# Patient Record
Sex: MALE | Race: WHITE | Employment: OTHER | URBAN - METROPOLITAN AREA
[De-identification: names, ages, dates, MRNs, and addresses within clinical notes are randomized per-mention and may not be internally consistent; named-entity substitution may affect disease eponyms.]

---

## 2021-05-18 ENCOUNTER — OFFICE VISIT (OUTPATIENT)
Dept: PRIMARY CARE CLINIC | Age: 66
End: 2021-05-18
Payer: MEDICARE

## 2021-05-18 VITALS
BODY MASS INDEX: 27.61 KG/M2 | DIASTOLIC BLOOD PRESSURE: 80 MMHG | SYSTOLIC BLOOD PRESSURE: 130 MMHG | HEART RATE: 67 BPM | HEIGHT: 68 IN | WEIGHT: 182.2 LBS | OXYGEN SATURATION: 100 % | TEMPERATURE: 98.3 F

## 2021-05-18 DIAGNOSIS — J02.9 SORE THROAT: ICD-10-CM

## 2021-05-18 DIAGNOSIS — M72.0 DUPUYTREN CONTRACTURE: Primary | ICD-10-CM

## 2021-05-18 PROCEDURE — G8417 CALC BMI ABV UP PARAM F/U: HCPCS | Performed by: FAMILY MEDICINE

## 2021-05-18 PROCEDURE — 3017F COLORECTAL CA SCREEN DOC REV: CPT | Performed by: FAMILY MEDICINE

## 2021-05-18 PROCEDURE — G8427 DOCREV CUR MEDS BY ELIG CLIN: HCPCS | Performed by: FAMILY MEDICINE

## 2021-05-18 PROCEDURE — 1123F ACP DISCUSS/DSCN MKR DOCD: CPT | Performed by: FAMILY MEDICINE

## 2021-05-18 PROCEDURE — 4040F PNEUMOC VAC/ADMIN/RCVD: CPT | Performed by: FAMILY MEDICINE

## 2021-05-18 PROCEDURE — 99213 OFFICE O/P EST LOW 20 MIN: CPT | Performed by: FAMILY MEDICINE

## 2021-05-18 PROCEDURE — 1036F TOBACCO NON-USER: CPT | Performed by: FAMILY MEDICINE

## 2021-05-18 RX ORDER — LISINOPRIL 20 MG/1
TABLET ORAL
COMMUNITY
Start: 2021-03-30 | End: 2021-08-02 | Stop reason: SDUPTHER

## 2021-05-18 RX ORDER — HYDROCHLOROTHIAZIDE 12.5 MG/1
TABLET ORAL
COMMUNITY
Start: 2021-03-30 | End: 2021-08-02 | Stop reason: SDUPTHER

## 2021-05-18 RX ORDER — SIMVASTATIN 20 MG
TABLET ORAL
COMMUNITY
Start: 2021-03-30 | End: 2021-08-02 | Stop reason: SDUPTHER

## 2021-05-18 SDOH — ECONOMIC STABILITY: TRANSPORTATION INSECURITY
IN THE PAST 12 MONTHS, HAS THE LACK OF TRANSPORTATION KEPT YOU FROM MEDICAL APPOINTMENTS OR FROM GETTING MEDICATIONS?: NO

## 2021-05-18 SDOH — ECONOMIC STABILITY: TRANSPORTATION INSECURITY
IN THE PAST 12 MONTHS, HAS LACK OF TRANSPORTATION KEPT YOU FROM MEETINGS, WORK, OR FROM GETTING THINGS NEEDED FOR DAILY LIVING?: NO

## 2021-05-18 SDOH — ECONOMIC STABILITY: FOOD INSECURITY: WITHIN THE PAST 12 MONTHS, THE FOOD YOU BOUGHT JUST DIDN'T LAST AND YOU DIDN'T HAVE MONEY TO GET MORE.: NEVER TRUE

## 2021-05-18 ASSESSMENT — PATIENT HEALTH QUESTIONNAIRE - PHQ9
SUM OF ALL RESPONSES TO PHQ9 QUESTIONS 1 & 2: 0
1. LITTLE INTEREST OR PLEASURE IN DOING THINGS: 0
SUM OF ALL RESPONSES TO PHQ QUESTIONS 1-9: 0
SUM OF ALL RESPONSES TO PHQ QUESTIONS 1-9: 0

## 2021-05-18 ASSESSMENT — SOCIAL DETERMINANTS OF HEALTH (SDOH): HOW HARD IS IT FOR YOU TO PAY FOR THE VERY BASICS LIKE FOOD, HOUSING, MEDICAL CARE, AND HEATING?: NOT HARD AT ALL

## 2021-05-18 NOTE — PROGRESS NOTES
Subjective:      Patient ID: Kostas Rod is a 77 y.o. male. Family h/o contracture of hand  joseph can feel his getting tighter  Also left side sore throat  No fever  H/o GERD, takes OTC prilosec      Review of Systems   Constitutional: Negative. HENT:        Left throat pain   Musculoskeletal:        Right hand tight       Objective:   Physical Exam  Constitutional:       Appearance: Normal appearance. HENT:      Mouth/Throat:      Mouth: Mucous membranes are moist.      Pharynx: Oropharynx is clear. Musculoskeletal:      Comments: Start of the lines of contracture but not yet surgical         Assessment:      1. Dupuytren contracture    2. Sore throat            Plan:      Senia Bermudez was seen today for hand pain and pharyngitis.     Diagnoses and all orders for this visit:    Dupuytren contracture  -     External Referral To Hand Surgery    Sore throat    sore throat lozenge            Electronically signed by Buck Perez MD on 5/18/2021 at 9:09 AM

## 2021-06-21 ENCOUNTER — TELEPHONE (OUTPATIENT)
Dept: PRIMARY CARE CLINIC | Age: 66
End: 2021-06-21

## 2021-07-08 ENCOUNTER — TELEPHONE (OUTPATIENT)
Dept: PRIMARY CARE CLINIC | Age: 66
End: 2021-07-08

## 2021-08-03 ENCOUNTER — OFFICE VISIT (OUTPATIENT)
Dept: PRIMARY CARE CLINIC | Age: 66
End: 2021-08-03
Payer: MEDICARE

## 2021-08-03 VITALS
HEIGHT: 68 IN | HEART RATE: 70 BPM | BODY MASS INDEX: 27.65 KG/M2 | WEIGHT: 182.4 LBS | DIASTOLIC BLOOD PRESSURE: 82 MMHG | OXYGEN SATURATION: 98 % | SYSTOLIC BLOOD PRESSURE: 132 MMHG

## 2021-08-03 DIAGNOSIS — J31.2 CHRONIC SORE THROAT: Primary | ICD-10-CM

## 2021-08-03 DIAGNOSIS — E78.2 HYPERLIPIDEMIA, MIXED: Primary | Chronic | ICD-10-CM

## 2021-08-03 DIAGNOSIS — Z00.00 ROUTINE GENERAL MEDICAL EXAMINATION AT A HEALTH CARE FACILITY: ICD-10-CM

## 2021-08-03 DIAGNOSIS — I10 ESSENTIAL HYPERTENSION: Chronic | ICD-10-CM

## 2021-08-03 PROCEDURE — 4040F PNEUMOC VAC/ADMIN/RCVD: CPT | Performed by: NURSE PRACTITIONER

## 2021-08-03 PROCEDURE — 3017F COLORECTAL CA SCREEN DOC REV: CPT | Performed by: NURSE PRACTITIONER

## 2021-08-03 PROCEDURE — 1123F ACP DISCUSS/DSCN MKR DOCD: CPT | Performed by: NURSE PRACTITIONER

## 2021-08-03 PROCEDURE — G0438 PPPS, INITIAL VISIT: HCPCS | Performed by: NURSE PRACTITIONER

## 2021-08-03 RX ORDER — SIMVASTATIN 20 MG
20 TABLET ORAL NIGHTLY
Qty: 90 TABLET | Refills: 1 | Status: SHIPPED | OUTPATIENT
Start: 2021-08-03 | End: 2022-01-19

## 2021-08-03 RX ORDER — HYDROCHLOROTHIAZIDE 12.5 MG/1
12.5 TABLET ORAL DAILY
Qty: 30 TABLET | Refills: 1 | Status: SHIPPED | OUTPATIENT
Start: 2021-08-03 | End: 2021-11-18

## 2021-08-03 RX ORDER — LISINOPRIL 20 MG/1
20 TABLET ORAL DAILY
Qty: 30 TABLET | Refills: 1 | Status: SHIPPED | OUTPATIENT
Start: 2021-08-03 | End: 2021-11-18

## 2021-08-03 ASSESSMENT — LIFESTYLE VARIABLES
HOW OFTEN DO YOU HAVE SIX OR MORE DRINKS ON ONE OCCASION: 0
HOW OFTEN DURING THE LAST YEAR HAVE YOU HAD A FEELING OF GUILT OR REMORSE AFTER DRINKING: NEVER
AUDIT-C TOTAL SCORE: 3
HOW OFTEN DURING THE LAST YEAR HAVE YOU NEEDED AN ALCOHOLIC DRINK FIRST THING IN THE MORNING TO GET YOURSELF GOING AFTER A NIGHT OF HEAVY DRINKING: 0
AUDIT TOTAL SCORE: 3
HOW OFTEN DURING THE LAST YEAR HAVE YOU FAILED TO DO WHAT WAS NORMALLY EXPECTED FROM YOU BECAUSE OF DRINKING: 0
AUDIT TOTAL SCORE: 0
HOW OFTEN DURING THE LAST YEAR HAVE YOU FOUND THAT YOU WERE NOT ABLE TO STOP DRINKING ONCE YOU HAD STARTED: NEVER
HOW MANY STANDARD DRINKS CONTAINING ALCOHOL DO YOU HAVE ON A TYPICAL DAY: ONE OR TWO
HAS A RELATIVE, FRIEND, DOCTOR, OR ANOTHER HEALTH PROFESSIONAL EXPRESSED CONCERN ABOUT YOUR DRINKING OR SUGGESTED YOU CUT DOWN: NO
HAVE YOU OR SOMEONE ELSE BEEN INJURED AS A RESULT OF YOUR DRINKING: NO
HOW OFTEN DURING THE LAST YEAR HAVE YOU FAILED TO DO WHAT WAS NORMALLY EXPECTED FROM YOU BECAUSE OF DRINKING: NEVER
HOW OFTEN DURING THE LAST YEAR HAVE YOU HAD A FEELING OF GUILT OR REMORSE AFTER DRINKING: 0
AUDIT-C TOTAL SCORE: 0
HOW MANY STANDARD DRINKS CONTAINING ALCOHOL DO YOU HAVE ON A TYPICAL DAY: 0
HAVE YOU OR SOMEONE ELSE BEEN INJURED AS A RESULT OF YOUR DRINKING: 0
HOW OFTEN DURING THE LAST YEAR HAVE YOU BEEN UNABLE TO REMEMBER WHAT HAPPENED THE NIGHT BEFORE BECAUSE YOU HAD BEEN DRINKING: 0
HOW OFTEN DO YOU HAVE SIX OR MORE DRINKS ON ONE OCCASION: NEVER
HOW OFTEN DURING THE LAST YEAR HAVE YOU BEEN UNABLE TO REMEMBER WHAT HAPPENED THE NIGHT BEFORE BECAUSE YOU HAD BEEN DRINKING: NEVER
HOW OFTEN DURING THE LAST YEAR HAVE YOU FOUND THAT YOU WERE NOT ABLE TO STOP DRINKING ONCE YOU HAD STARTED: 0
HOW OFTEN DO YOU HAVE A DRINK CONTAINING ALCOHOL: 3
HOW OFTEN DO YOU HAVE A DRINK CONTAINING ALCOHOL: TWO TO THREE TIMES A WEEK
HAS A RELATIVE, FRIEND, DOCTOR, OR ANOTHER HEALTH PROFESSIONAL EXPRESSED CONCERN ABOUT YOUR DRINKING OR SUGGESTED YOU CUT DOWN: 0
HOW OFTEN DURING THE LAST YEAR HAVE YOU NEEDED AN ALCOHOLIC DRINK FIRST THING IN THE MORNING TO GET YOURSELF GOING AFTER A NIGHT OF HEAVY DRINKING: NEVER

## 2021-08-03 ASSESSMENT — PATIENT HEALTH QUESTIONNAIRE - PHQ9
SUM OF ALL RESPONSES TO PHQ QUESTIONS 1-9: 0
1. LITTLE INTEREST OR PLEASURE IN DOING THINGS: 0
SUM OF ALL RESPONSES TO PHQ QUESTIONS 1-9: 0
2. FEELING DOWN, DEPRESSED OR HOPELESS: 0
SUM OF ALL RESPONSES TO PHQ QUESTIONS 1-9: 0
SUM OF ALL RESPONSES TO PHQ9 QUESTIONS 1 & 2: 0

## 2021-08-03 NOTE — PATIENT INSTRUCTIONS
Personalized Preventive Plan for Carleen Wang - 8/3/2021  Medicare offers a range of preventive health benefits. Some of the tests and screenings are paid in full while other may be subject to a deductible, co-insurance, and/or copay. Some of these benefits include a comprehensive review of your medical history including lifestyle, illnesses that may run in your family, and various assessments and screenings as appropriate. After reviewing your medical record and screening and assessments performed today your provider may have ordered immunizations, labs, imaging, and/or referrals for you. A list of these orders (if applicable) as well as your Preventive Care list are included within your After Visit Summary for your review. Other Preventive Recommendations:    · A preventive eye exam performed by an eye specialist is recommended every 1-2 years to screen for glaucoma; cataracts, macular degeneration, and other eye disorders. · A preventive dental visit is recommended every 6 months. · Try to get at least 150 minutes of exercise per week or 10,000 steps per day on a pedometer . · Order or download the FREE \"Exercise & Physical Activity: Your Everyday Guide\" from [employer]. Call 4-253.609.7277 or search [employer] online. · You need 4264-0783 mg of calcium and 8492-2128 IU of vitamin D per day. It is possible to meet your calcium requirement with diet alone, but a vitamin D supplement is usually necessary to meet this goal.  · When exposed to the sun, use a sunscreen that protects against both UVA and UVB radiation with an SPF of 30 or greater. Reapply every 2 to 3 hours or after sweating, drying off with a towel, or swimming. · Always wear a seat belt when traveling in a car. Always wear a helmet when riding a bicycle or motorcycle.

## 2021-08-03 NOTE — PROGRESS NOTES
Medicare Annual Wellness Visit  Name: Fredo Hauser Date: 8/3/2021   MRN: [id number] Sex: Male   Age: 77 y.o. Ethnicity: Unavailable / Unknown   : 1955 Race: White (non-)      Arturo Lin is here for Medicare AWV    Screenings for behavioral, psychosocial and functional/safety risks, and cognitive dysfunction are all negative except as indicated below. These results, as well as other patient data from the [address] form, are documented in Flowsheets linked to this Encounter. Allergies   Allergen Reactions    Seasonal      Runny nose scratchy throat       Prior to Visit Medications    Medication Sig Taking? Authorizing Provider   simvastatin (ZOCOR) 20 MG tablet Take 1 tablet by mouth nightly Yes Luisa Griffin MD   lisinopril (PRINIVIL;ZESTRIL) 20 MG tablet Take 1 tablet by mouth daily Yes Luisa Griffin MD   hydroCHLOROthiazide (HYDRODIURIL) 12.5 MG tablet Take 1 tablet by mouth daily Yes Luisa Griffin MD       Past Medical History:   Diagnosis Date    Hyperlipidemia     Hypertension        History reviewed. No pertinent surgical history. Family History   Problem Relation Age of Onset    No Known Problems Mother     No Known Problems Father        CareTeam (Including outside providers/suppliers regularly involved in providing care):   Patient Care Team:  Luisa Griffin MD as PCP - General (Family Medicine)  Luisa Griffin MD as PCP - [facility] Empaneled Provider    Wt Readings from Last 3 Encounters:   21 182 lb 6.4 oz (82.7 kg)   21 182 lb 3.2 oz (82.6 kg)     Vitals:    21 1028   BP: 132/82   Site: Left Upper Arm   Position: Sitting   Cuff Size: Medium Adult   Pulse: 70   SpO2: 98%   Weight: 182 lb 6.4 oz (82.7 kg)   Height: 5' 8\" (1.727 m)     Body mass index is 27.73 kg/m². Based upon direct observation of the patient, evaluation of cognition reveals recent and remote memory intact.       Patient's complete Health Risk Assessment and screening values have been reviewed and are found in Flowsheets. The following problems were reviewed today and where indicated follow up appointments were made and/or referrals ordered. Positive Risk Factor Screenings with Interventions:          General Health and ACP:  General  In general, how would you say your health is?: Very Good  In the past 7 days, have you experienced any of the following?  New or Increased Pain, New or Increased Fatigue, Loneliness, Social Isolation, Stress or Anger?: None of These  Do you get the social and emotional support that you need?: Yes  Do you have a Living Will?: Yes  Advance Directives     Power of [address] [facility] Will ACP-Advance Directive ACP-Power of     Not on File Not on File Not on File Not on File      General Health Risk Interventions:  · no issues identified     Hearing/Vision:  No exam data present  Hearing/Vision  Do you or your family notice any trouble with your hearing that hasn't been managed with hearing aids?: (!) Yes  Do you have difficulty driving, watching TV, or doing any of your daily activities because of your eyesight?: No  Have you had an eye exam within the past year?: (!) No  Hearing/Vision Interventions:  · Hearing concerns:  patient declines any further evaluation/treatment for hearing issues  · Vision concerns:  patient encouraged to make appointment with his/her eye specialist      Personalized Preventive Plan   Current Health Maintenance Status  Immunization History   Administered Date(s) Administered    COVID-19, Bergeron Peter, PF, 30mcg/0.3mL 02/17/2021, 03/10/2021        Health Maintenance   Topic Date Due    Potassium monitoring  Never done    Creatinine monitoring  Never done    Lipid screen  Never done    DTaP/Tdap/Td vaccine (1 - Tdap) Never done    Diabetes screen  Never done    Colon cancer screen colonoscopy  Never done    Shingles Vaccine (1 of 2) Never done    Pneumococcal 65+ years Vaccine (1 of 1 - PPSV23) Never done   [employer] Visit (AWV)  Never done    Flu vaccine (1) 09/01/2021    COVID-19 Vaccine  Completed    Hepatitis A vaccine  Aged Out    Hepatitis B vaccine  Aged Out    Hib vaccine  Aged Out    Meningococcal (ACWY) vaccine  Aged Out    Hepatitis C screen  Discontinued     Recommendations for [employer] Due: see orders and patient instructions/AVS.  . Recommended screening schedule for the next 5-10 years is provided to the patient in written form: see Patient Licha Chen was seen today for medicare awv. Diagnoses and all orders for this visit:    Hyperlipidemia, mixed  -     Lipid Panel; Future    Essential hypertension  -     Basic Metabolic Panel;  Future    Routine general medical examination at a health care facility

## 2021-09-10 ENCOUNTER — HOSPITAL ENCOUNTER (OUTPATIENT)
Age: 66
Discharge: HOME | End: 2021-09-10
Payer: MEDICARE

## 2021-09-10 PROCEDURE — 93005 ELECTROCARDIOGRAM TRACING: CPT | Performed by: OTOLARYNGOLOGY

## 2021-09-11 LAB
EKG ATRIAL RATE: 79 BPM
EKG P AXIS: 63 DEGREES
EKG P-R INTERVAL: 186 MS
EKG Q-T INTERVAL: 396 MS
EKG QRS DURATION: 102 MS
EKG QTC CALCULATION (BAZETT): 454 MS
EKG R AXIS: -46 DEGREES
EKG T AXIS: 35 DEGREES
EKG VENTRICULAR RATE: 79 BPM

## 2021-09-11 PROCEDURE — 93010 ELECTROCARDIOGRAM REPORT: CPT | Performed by: INTERNAL MEDICINE

## 2021-09-22 ENCOUNTER — HOSPITAL ENCOUNTER (OUTPATIENT)
Dept: CT IMAGING | Age: 66
Discharge: HOME | End: 2021-09-24
Payer: MEDICARE

## 2021-09-22 DIAGNOSIS — D37.02 NEOPLASM OF UNCERTAIN BEHAVIOR OF BASE OF TONGUE: ICD-10-CM

## 2021-09-22 LAB
CREAT SERPL-MCNC: 0.87 MG/DL (ref 0.7–1.2)
GFR AFRICAN AMERICAN: >60 ML/MIN
GFR NON-AFRICAN AMERICAN: >60 ML/MIN
GFR SERPL CREATININE-BSD FRML MDRD: NORMAL ML/MIN/{1.73_M2}
GFR SERPL CREATININE-BSD FRML MDRD: NORMAL ML/MIN/{1.73_M2}

## 2021-09-22 PROCEDURE — 2580000003 HC RX 258: Performed by: NURSE PRACTITIONER

## 2021-09-22 PROCEDURE — 70491 CT SOFT TISSUE NECK W/DYE: CPT

## 2021-09-22 PROCEDURE — 82565 ASSAY OF CREATININE: CPT

## 2021-09-22 PROCEDURE — 6360000004 HC RX CONTRAST MEDICATION: Performed by: NURSE PRACTITIONER

## 2021-09-22 PROCEDURE — 36415 COLL VENOUS BLD VENIPUNCTURE: CPT

## 2021-09-22 RX ORDER — 0.9 % SODIUM CHLORIDE 0.9 %
80 INTRAVENOUS SOLUTION INTRAVENOUS ONCE
Status: COMPLETED | OUTPATIENT
Start: 2021-09-22 | End: 2021-09-22

## 2021-09-22 RX ORDER — SODIUM CHLORIDE 0.9 % (FLUSH) 0.9 %
10 SYRINGE (ML) INJECTION PRN
Status: DISCONTINUED | OUTPATIENT
Start: 2021-09-22 | End: 2021-09-25 | Stop reason: HOSPADM

## 2021-09-22 RX ADMIN — SODIUM CHLORIDE, PRESERVATIVE FREE 10 ML: 5 INJECTION INTRAVENOUS at 15:59

## 2021-09-22 RX ADMIN — IOPAMIDOL 75 ML: 755 INJECTION, SOLUTION INTRAVENOUS at 16:02

## 2021-09-22 RX ADMIN — SODIUM CHLORIDE 80 ML: 9 INJECTION, SOLUTION INTRAVENOUS at 16:01

## 2021-09-27 ENCOUNTER — HOSPITAL ENCOUNTER (OUTPATIENT)
Age: 66
Setting detail: SPECIMEN
Discharge: HOME | End: 2021-09-27
Payer: MEDICARE

## 2021-10-01 LAB — SURGICAL PATHOLOGY REPORT: NORMAL

## 2021-10-05 ENCOUNTER — TELEPHONE (OUTPATIENT)
Dept: ONCOLOGY | Age: 66
End: 2021-10-05

## 2021-10-05 NOTE — TELEPHONE ENCOUNTER
Shorty Arias., Dr. Ludwin Naidu office, called alerting writer to pt's case & requesting oncology navigation. Elayne stated referrals placed for MO & RO. Requested referrals along with pt's records faxed to [facility] RO @ [phone]. [facility] updated on conversation with Elayne & requested dual MO/RO consults scheduled. [facility] alerted writer pt scheduled 10/12 to arrive @ [address], [address] Dr[address] Hubert Lee consult, & [address] Dr[address] Villa Kwong consult. Name: Anisha Todd  : 1955  MRN: [id number]    Oncology Navigation- Initial Note:    Intake-  Contact Type: Telephone  Notes: Introductory phone call made to patient, instructed patient Hoda Braun will be navigating oncology care & may call writer with any questions/concerns @ [phone] prn. Discussed potential barriers to care, pt denied any. Inquired on dental health, pt stated sees private dentist q 6 months, last seen 5 months ago, & @ this time unsure of dentist name. Requested pt bring dentist information to consultations, pt verbalized understanding. Pt updated on 10/12 dual RO/MO appt details & verbalized understanding. Pt denied questions/concerns. [address] written materials along with writer's business card mailed to patient. Will continue to follow.     Electronically signed by Colby Pena RN on 10/5/2021 at 1:45 PM

## 2021-10-06 ENCOUNTER — HOSPITAL ENCOUNTER (OUTPATIENT)
Dept: NUCLEAR MEDICINE | Age: 66
Discharge: HOME | End: 2021-10-08
Payer: MEDICARE

## 2021-10-06 DIAGNOSIS — C09.0 MALIGNANT NEOPLASM OF TONSILLAR FOSSA (HCC): ICD-10-CM

## 2021-10-06 LAB — GLUCOSE BLD-MCNC: 105 MG/DL (ref 75–110)

## 2021-10-06 PROCEDURE — 82947 ASSAY GLUCOSE BLOOD QUANT: CPT

## 2021-10-06 PROCEDURE — 78815 PET IMAGE W/CT SKULL-THIGH: CPT

## 2021-10-06 PROCEDURE — 3430000000 HC RX DIAGNOSTIC RADIOPHARMACEUTICAL: Performed by: OTOLARYNGOLOGY

## 2021-10-06 PROCEDURE — 2580000003 HC RX 258: Performed by: OTOLARYNGOLOGY

## 2021-10-06 PROCEDURE — A9552 F18 FDG: HCPCS | Performed by: OTOLARYNGOLOGY

## 2021-10-06 RX ORDER — SODIUM CHLORIDE 0.9 % (FLUSH) 0.9 %
10 SYRINGE (ML) INJECTION ONCE
Status: COMPLETED | OUTPATIENT
Start: 2021-10-06 | End: 2021-10-06

## 2021-10-06 RX ORDER — FLUDEOXYGLUCOSE F 18 200 MCI/ML
10 INJECTION, SOLUTION INTRAVENOUS
Status: COMPLETED | OUTPATIENT
Start: 2021-10-06 | End: 2021-10-06

## 2021-10-06 RX ADMIN — FLUDEOXYGLUCOSE F 18 11.38 MILLICURIE: 200 INJECTION, SOLUTION INTRAVENOUS at 07:51

## 2021-10-06 RX ADMIN — SODIUM CHLORIDE, PRESERVATIVE FREE 10 ML: 5 INJECTION INTRAVENOUS at 07:51

## 2021-10-12 ENCOUNTER — TELEPHONE (OUTPATIENT)
Dept: PRIMARY CARE CLINIC | Age: 66
End: 2021-10-12

## 2021-10-12 ENCOUNTER — INITIAL CONSULT (OUTPATIENT)
Dept: ONCOLOGY | Age: 66
End: 2021-10-12
Payer: MEDICARE

## 2021-10-12 ENCOUNTER — HOSPITAL ENCOUNTER (OUTPATIENT)
Dept: RADIATION ONCOLOGY | Age: 66
Discharge: HOME | End: 2021-10-12
Payer: MEDICARE

## 2021-10-12 VITALS
SYSTOLIC BLOOD PRESSURE: 148 MMHG | HEIGHT: 68 IN | DIASTOLIC BLOOD PRESSURE: 84 MMHG | WEIGHT: 181 LBS | BODY MASS INDEX: 27.43 KG/M2 | HEART RATE: 66 BPM | TEMPERATURE: 97.7 F

## 2021-10-12 VITALS
TEMPERATURE: 97.6 F | HEIGHT: 68 IN | DIASTOLIC BLOOD PRESSURE: 81 MMHG | WEIGHT: 181 LBS | RESPIRATION RATE: 18 BRPM | SYSTOLIC BLOOD PRESSURE: 164 MMHG | OXYGEN SATURATION: 99 % | BODY MASS INDEX: 27.43 KG/M2 | HEART RATE: 70 BPM

## 2021-10-12 DIAGNOSIS — C09.9 SQUAMOUS CELL CARCINOMA OF LEFT TONSIL (HCC): Primary | ICD-10-CM

## 2021-10-12 DIAGNOSIS — C10.9 OROPHARYNGEAL CANCER (HCC): Primary | ICD-10-CM

## 2021-10-12 PROCEDURE — G8427 DOCREV CUR MEDS BY ELIG CLIN: HCPCS | Performed by: INTERNAL MEDICINE

## 2021-10-12 PROCEDURE — 99213 OFFICE O/P EST LOW 20 MIN: CPT | Performed by: RADIOLOGY

## 2021-10-12 PROCEDURE — 99205 OFFICE O/P NEW HI 60 MIN: CPT | Performed by: RADIOLOGY

## 2021-10-12 PROCEDURE — 99205 OFFICE O/P NEW HI 60 MIN: CPT | Performed by: INTERNAL MEDICINE

## 2021-10-12 PROCEDURE — G8482 FLU IMMUNIZE ORDER/ADMIN: HCPCS | Performed by: INTERNAL MEDICINE

## 2021-10-12 PROCEDURE — G8417 CALC BMI ABV UP PARAM F/U: HCPCS | Performed by: INTERNAL MEDICINE

## 2021-10-12 RX ORDER — ACETAMINOPHEN AND CODEINE PHOSPHATE 300; 30 MG/1; MG/1
TABLET ORAL
Status: ON HOLD | COMMUNITY
Start: 2021-09-27 | End: 2022-05-24 | Stop reason: HOSPADM

## 2021-10-12 RX ORDER — OMEPRAZOLE 20 MG/1
20 CAPSULE, DELAYED RELEASE ORAL DAILY
COMMUNITY

## 2021-10-12 RX ORDER — M-VIT,TX,IRON,MINS/CALC/FOLIC 27MG-0.4MG
1 TABLET ORAL DAILY
COMMUNITY

## 2021-10-12 ASSESSMENT — PAIN DESCRIPTION - PAIN TYPE: TYPE: ACUTE PAIN

## 2021-10-12 ASSESSMENT — PAIN DESCRIPTION - LOCATION: LOCATION: THROAT

## 2021-10-12 ASSESSMENT — PAIN SCALES - GENERAL: PAINLEVEL_OUTOF10: 2

## 2021-10-12 NOTE — CONSULTS
[address]            Radiation Oncology          [address] [facility], [address]        Deandra Terry: [phone]        F: [phone]       [facility] [address] [facility]       Radiation Oncology   [facility] [address] [place] [address], [address] [facility]       Deandra Cloudz: [phone]       F: [phone]       [employer]        10/12/2021    Patient: Ludin Daley   YOB: 1955       Dear Dr Alaina Valiente:    Thank you for referring Ludin Daley to me for evaluation. Below are the relevant portions of my assessment and plan of care. If you have questions, please do not hesitate to call me. I look forward to following this patient along with you. Sincerely,    Electronically signed by Cedric Baumgarten, MD on 10/12/21 at 1:58 PM EDT    CC: Patient Care Team:  Roger Alonzo MD as PCP - General (Family Medicine)  Roger Alonzo MD as PCP - [facility]  Josie Salguero RN as Nurse Navigator (Oncology)  ------------------------------------------------------------------------------------------------------------------------------------------------------------------------------------------      RADIATION ONCOLOGY NEW PATIENT VISIT:    Date of Service: 10/12/2021    Location:  [facility] Radiation Oncology,   [address] [place] [address], Counts include 234 beds at the [facility]   [phone]     Patient ID:   Ludin Daley  : 1955   MRN: [id number]    CHIEF COMPLAINT: \"Left tonsil cancer\"    DIAGNOSIS:  Cancer Staging  Oropharyngeal cancer ([facility] [address]  Staging form: Pharynx - HPV-Mediated Oropharynx, AJCC 8th Edition  - Clinical stage from 10/12/2021: Stage I (cT2, cN0, cM0) - Signed by Gwen Goode MD on 10/12/2021      HISTORY OF PRESENT ILLNESS:   Ludin Daley is a 77 y.o. male with a history of sore throat for about 6 months who was referred by his PCP to ENT and saw Dr Alaina Valiente and was noted to have a left tonsillar lesion. A CT of the neck was done and found a 3.2cm mass in the left tonsil, and a follow up PET scan on 10/6/21 confirmed only FDG uptake in the left tonsil mass. 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 He has otherwise noted no symptoms of weight loss, hemoptysis, or trouble swallowing. He denies any other symptoms of headaches, dizziness, chest pain, shortness of breath, abdominal pain, nausea, diarrhea, bony pain, swelling, or bleeding. Patient is otherwise very active and would like to minimize any toxicities. He does follow with a dentist routinely and has good dentition. Patient has been referred to us today in radiation oncology and medical oncology for discussion of his diagnosis and treatment options.     PRIOR RADIATION HISTORY:  No prior history of radiation therapy    PACEMAKER: None    PAST MEDICAL HISTORY:  Past Medical History:   Diagnosis Date    Hyperlipidemia     Hypertension        PAST SURGICAL HISTORY:  Past Surgical History:   Procedure Laterality Date    ACHILLES TENDON SURGERY      about 30 years ago        MEDICATIONS:    Current Outpatient Medications:     omeprazole (PRILOSEC) 20 MG delayed release capsule, Take 20 mg by mouth daily, Disp: , Rfl:     Multiple Vitamins-Minerals (THERAPEUTIC MULTIVITAMIN-MINERALS) tablet, Take 1 tablet by mouth daily, Disp: , Rfl:     acetaminophen-codeine (TYLENOL #3) 300-30 MG per tablet, , Disp: , Rfl:     simvastatin (ZOCOR) 20 MG tablet, Take 1 tablet by mouth nightly, Disp: 90 tablet, Rfl: 1    lisinopril (PRINIVIL;ZESTRIL) 20 MG tablet, Take 1 tablet by mouth daily, Disp: 30 tablet, Rfl: 1    hydroCHLOROthiazide (HYDRODIURIL) 12.5 MG tablet, Take 1 tablet by mouth daily, Disp: 30 tablet, Rfl: 1    ALLERGIES:   Allergies   Allergen Reactions    Seasonal      Runny nose scratchy throat       SOCIAL HISTORY:  Social History     Socioeconomic History    Marital status:      Spouse name: None    Number of children: None    Years of education: None    Highest education level: None   Occupational History    None   Tobacco Use    Smoking status: Former Smoker     Types: Cigarettes    Smokeless tobacco: Never Used   Vaping Use    Vaping Use: Never used   Substance and Sexual Activity    Alcohol use: Yes     Comment: socially     Drug use: Not Currently    Sexual activity: Yes     Partners: Female   Other Topics Concern    None   Social History Narrative    None     Social Determinants of Health     Financial Resource Strain: Low Risk     Difficulty of Paying Living Expenses: Not hard at all   Food Insecurity: No Food Insecurity    Worried About Running Out of Food in the Last Year: Never true    [address] in the Last Year: Never true   Transportation Needs: No Transportation Needs    Lack of Transportation (Medical): No    Lack of Transportation (Non-Medical): No   Physical Activity:     Days of Exercise per Week:     Minutes of Exercise per Session:    Stress: No Stress Concern Present    Feeling of Stress : Not at all   Social Connections:     Frequency of Communication with Friends and Family:     Frequency of Social Gatherings with Friends and Family:     Attends Bahai Services:     Active Member of Clubs or Organizations:     Attends Club or Organization Meetings:     Marital Status:    Intimate Partner Violence:     Fear of Current or Ex-Partner:     Emotionally Abused:     Physically Abused:     Sexually Abused:        FAMILY HISTORY:  Family History   Problem Relation Age of Onset    Coronary Art Dis Mother     Cancer Father        REVIEW OF SYSTEMS:    GENERAL/CONSTITUTIONAL: The patient denies fever, fatigue, weakness, weight gain or weight loss. HEENT: (+) Sore throat. The patient denies pain, redness, loss of vision, double or blurred vision.  The patient denies ringing in the ears, loss of hearing, hoarseness, trouble swallowing, or painful swallowing. CARDIOVASCULAR: The patient denies chest pain, irregular heartbeats, sudden changes in heartbeat or palpitation. RESPIRATORY: The patient denies shortness of breath, cough, hemoptysis. GASTROINTESTINAL: The patient denies nausea, vomiting, diarrhea, constipation, blood in the stools. GENITOURINARY: The patient denies difficult urination, pain or burning with urination, blood in the urine. MUSCULOSKELETAL: The patient denies arm, buttock, thigh or calf cramps. No joint or muscle pain. SKIN: The patient denies easy bruising, skin redness, skin rash, hives. NEUROLOGIC: The patient denies headache, dizziness, fainting, muscle spasm, loss of consciousness. ENDOCRINE: The patient denies intolerance to hot or cold temperature, flushing. HEMATOLOGIC/LYMPHATIC: The patient denies anemia, bleeding tendency or clotting tendency. ALLERGIC/IMMUNOLOGIC: The patient denies rhinitis, asthma, skin sensitivity. PYSCHOLOGIC: The patient denies any depression, anxiety, or confusion. A full 14 point review of systems was performed and assessed and found to be negative except as noted above. PHYSICAL EXAMINATION:    CHAPERONE: Family/friend/companieon Present    ECO Asymptomatic    VITAL SIGNS: BP (!) 164/81   Pulse 70   Temp 97.6 °F (36.4 °C) (Temporal)   Resp 18   Ht 5' 8\" (1.727 m)   Wt 181 lb (82.1 kg)   SpO2 99%   BMI 27.52 kg/m²   GENERAL:  General appearance is that of a well-nourished, well-developed in no apparent distress. HEENT: Normocephalic, atraumatic, EOMI, moist mucosa, (+) L tonsil erythematous mass. NECK:  No adenopathy or a palpable thyroid mass, trachea is midline. LYMPHATICS: No cervical, supraclavicular adenopathy. HEART:  Normal rate and regular rhythm, normal heart sounds. LUNGS:  Pulmonary effort normal. Normal breath sounds. ABDOMEN:  Soft, nontender, non distended. EXTREMITIES:  No edema. No calf tenderness.   MSK:  No spinal tenderness. Normal ROM. NEUROLOGICAL: Alert and oriented. Strength and sensation intact bilaterally. No focal deficits. PSYCH: Mood normal, behavior normal.  SKIN: No erythema, no desquamation. LABS:  No results found for: WBC, NEUTROABS, HGB, PLT  No results found for: , CEA  No results found for:   No results found for: PSA      IMAGING:   10/6/21 PET Scan  Impression   1. Redemonstration of left palatine tonsillar lesion which is FDG avid   consistent with history of malignancy. 2. Posterior right thyroid hypodense nodule which is FDG avid, recommend   further evaluation with dedicated ultrasound. 3. Left retrocardiac lower lobe nodular area of opacification as measured   above which demonstrates faint uptake and is favored to represent possible   pneumonia or other infectious/inflammatory process though   metastatic/neoplastic etiology not definitively excluded.  Recommend post   treatment chest CT in 6-8 weeks to reassess.  If persistent at time of   follow-up then further evaluation can be obtained with tissue sampling.       9/22/21 CT Neck  Impression   1. 3.2 x 2.3 cm masslike lesion centered in the left palatine tonsil,   extending anteriorly to the base the tongue.  ENT evaluation and biopsy   recommended. 2. No lymphadenopathy or other evidence of metastatic disease in the neck. PATHOLOGY:  10/1/21 Biopsy  -- Diagnosis --   LEFT TONSIL, BIOPSIES:             -  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          ASSESSMENT AND PLAN:   Tiera Delcid is a 77 y.o. male with a Cancer Staging  Oropharyngeal cancer ([facility] [address]  Staging form: Pharynx - HPV-Mediated Oropharynx, AJCC 8th Edition  - Clinical stage from 10/12/2021: Stage I (cT2, cN0, cM0) - Signed by Madelaine Hope MD on 10/12/2021      We reviewed with the patient and family the testing that has been done so far, including imaging and pathology results, and the symptoms of sore throat. We also reviewed their staging based on the testing that as been done and the recommendations per the NCCN guidelines. We discussed the options of treatment, including surgery, chemotherapy, and radiation, and the rationale of why radiation therapy would be indicated for this diagnosis. We also discussed that they have the option to not pursue our recommended treatment as well. We reviewed that surgery could be a viable option which could avoid any adjuvant treatment with radiation or chemotherapy if he can be resected without any adverse features. We also reviewed that since he doesn't have any lymph node involvement definitive radiation therapy could also be considered. We reviewed the logistics of radiation treatment planning, including a CT Simulation session, as well as daily treatments for approximately 6-7 weeks. With regards to radiation to the head/neck area, I discussed the possible short-term side effects of skin reaction (causing redness, dryness, or peeling), tiredness, low blood counts (causing infection or bleeding), sore throat, change in taste, hair loss in treated area and dryness of the mouth, dysphagia leading to feeding tube placement which may be permanent. Possible long-term side effects discussed included hyperpigmentation of the skin, increased firmness of the tissues of the neck, permanent dryness of the mouth, permanent change in taste, damage to the nerves (causing numbness, weakness, or paralysis), damage to the brain, damage to the bone (causing necrosis), trismus, hearing loss, decreased thyroid function, and scarring of the lung (causing shortness of breath/cough). After ample time to review the patient's questions, patient will be referred to ENT at [facility] for evaluation of tonsillectomy +/- neck dissection. We will follow up afterwards to review options or pathology.  He will still be advised to follow up with his dentist for screening and evaluation in case he does need radiation therapy. Patient was in agreement with my recommendations. All questions were answered to their satisfaction. Patient was advised to contact us anytime should they have any questions or concerns. Electronically signed by Raimundo Allen MD on 10/12/21 at 1:58 PM EDT      Drugs Prescribed:  New Prescriptions    No medications on file       Orders Placed:     Orders Placed This Encounter   Procedures    External Referral To ENT         CC:  Patient Care Team:  Netta Geller MD as PCP - General (Family Medicine)  Netta Geller MD as PCP - [facility] Empaneled Provider  Sheree Fair RN as Nurse Navigator (Oncology)         Total time spent on this case on the day of encounter is more than 60 minutes. This time includes combination of one or more of the following - review of necessary tests, review of pertinent medical records from the EMR, performing medically appropriate examination and evaluation, counseling and educating the patient/family/caregiver, ordering necessary medical tests, procedures etc., documenting the clinical information in the electronic medical record, care coordination, referring and communicating with other health care providers and interpretation of results independently. This note is created with the assistance of a speech recognition program.  While intending to generate a document that actually reflects the content of the visit, the document can still have some errors including those of syntax and sound a like substitutions which may escape proof reading. It such instances, actual meaning can be extrapolated by contextual diversion.

## 2021-10-12 NOTE — PATIENT INSTRUCTIONS
Referral to ENT surgeon at [facility]  RTC 2 weeks after surgery (please have Abbie Almanzar follow up on this)

## 2021-10-12 NOTE — TELEPHONE ENCOUNTER
[address] [facility] calling about referral and needing recent labs, imagining, and the last office note. I sent over an office note but we did not order any labs or imaging. And I dont believe we made the referral so I faxed a letter to [employer] stating this.

## 2021-10-12 NOTE — PROGRESS NOTES
Patient ID: Mirtha Russ, 1955, [id number], 77 y.o. Referred by : Eloy Valdes MD  Reason for consultation:   Oropharyngeal carcin shirley, left tonsil squamous cell carcinoma, p16 positive, clinical stage T2 a N0 M0, stage I disease    Cancer Staging  Oropharyngeal cancer ([facility] [address]  Staging form: Pharynx - HPV-Mediated Oropharynx, AJCC 8th Edition  - Clinical stage from 10/12/2021: Stage I (cT2, cN0, cM0) - Signed by Leonardo Luque MD on 10/12/2021      HISTORY OF PRESENT ILLNESS:    Oncologic History:  Mirtha Russ is 77 y.o. male was seen during initial consultation with for newly diagnosed left tonsillar/oropharyngeal carcinoma. Patient initially presented with sore throat/throat pain in May of this year and was referred to ENT for further evaluation. His ENT evaluation did show 3 cm left tonsillar mass limited to tonsils only. Patient had a CT of the neck on 9/22/2021 which showed 3.2 cm mass in the left palatine tonsil without any abnormal lymphadenopathy. Patient was evaluated by ENT and had a biopsy of the left tonsil which showed poorly differentiated invasive squamous cell carcinoma, p16 positive. Subsequently patient had a PET scan on 10/6/2021 which showed no distant metastasis and no lymph node metastasis noted. Patient has been evaluated by radiation oncology today. He denies any unintentional weight loss, drenching night sweats fever chills.     Past Medical History:   Diagnosis Date    Hyperlipidemia     Hypertension        Past Surgical History:   Procedure Laterality Date    ACHILLES TENDON SURGERY      about 30 years ago        Allergies   Allergen Reactions    Seasonal      Runny nose scratchy throat       Current Outpatient Medications   Medication Sig Dispense Refill    acetaminophen-codeine (TYLENOL #3) 300-30 MG per tablet       omeprazole (PRILOSEC) 20 MG delayed release capsule Take 20 mg by mouth daily      Multiple Vitamins-Minerals (THERAPEUTIC MULTIVITAMIN-MINERALS) tablet Take 1 tablet by mouth daily      simvastatin (ZOCOR) 20 MG tablet Take 1 tablet by mouth nightly 90 tablet 1    lisinopril (PRINIVIL;ZESTRIL) 20 MG tablet Take 1 tablet by mouth daily 30 tablet 1    hydroCHLOROthiazide (HYDRODIURIL) 12.5 MG tablet Take 1 tablet by mouth daily 30 tablet 1     No current facility-administered medications for this visit. Social History     Socioeconomic History    Marital status:      Spouse name: Not on file    Number of children: Not on file    Years of education: Not on file    Highest education level: Not on file   Occupational History    Not on file   Tobacco Use    Smoking status: Former Smoker     Types: Cigarettes    Smokeless tobacco: Never Used   Vaping Use    Vaping Use: Never used   Substance and Sexual Activity    Alcohol use: Yes     Comment: socially     Drug use: Not Currently    Sexual activity: Yes     Partners: Female   Other Topics Concern    Not on file   Social History Narrative    Not on file     Social Determinants of Health     Financial Resource Strain: Low Risk     Difficulty of Paying Living Expenses: Not hard at all   Food Insecurity: No Food Insecurity    Worried About [facility] in the Last Year: Never true    Judi of Food in the Last Year: Never true   Transportation Needs: No Transportation Needs    Lack of Transportation (Medical): No    Lack of Transportation (Non-Medical):  No   Physical Activity:     Days of Exercise per Week:     Minutes of Exercise per Session:    Stress: No Stress Concern Present    Feeling of Stress : Not at all   Social Connections:     Frequency of Communication with Friends and Family:     Frequency of Social Gatherings with Friends and Family:     Attends Hoahaoism Services:     Active Member of Clubs or Organizations:     Attends Club or Organization Meetings:     Marital Status:    Intimate Partner Violence:     Fear of Current or Ex-Partner:     Emotionally Abused:     Physically Abused:     Sexually Abused:        Family History   Problem Relation Age of Onset    Coronary Art Dis Mother     Cancer Father         REVIEW OF SYSTEM:     Constitutional: No fever or chills. No night sweats, no weight loss   Eyes: No eye discharge, double vision, or eye pain   HEENT: negative for sore mouth, sore throat, hoarseness and voice change   Respiratory: negative for cough , sputum, dyspnea, wheezing, hemoptysis, chest pain   Cardiovascular: negative for chest pain, dyspnea, palpitations, orthopnea, PND   Gastrointestinal: negative for nausea, vomiting, diarrhea, constipation, abdominal pain, Dysphagia, hematemesis and hematochezia   Genitourinary: negative for frequency, dysuria, nocturia, urinary incontinence, and hematuria   Integument: negative for rash, skin lesions, bruises.    Hematologic/Lymphatic: negative for easy bruising, bleeding, lymphadenopathy, petechiae and swelling/edema   Endocrine: negative for heat or cold intolerance, tremor, weight changes, change in bowel habits and hair loss   Musculoskeletal: negative for myalgias, arthralgias, pain, joint swelling,and bone pain   Neurological: negative for headaches, dizziness, seizures, weakness, numbness       OBJECTIVE:         Vitals:    10/12/21 1250   BP: (!) 148/84   Pulse: 66   Temp: 97.7 °F (36.5 °C)       PHYSICAL EXAM:   General appearance - well appearing, no in pain or distress   Mental status - alert and cooperative   Eyes - pupils equal and reactive, extraocular eye movements intact   Ears - bilateral TM's and external ear canals normal   Mouth - mucous membranes moist, pharynx normal without lesions   Neck - supple, no significant adenopathy   Lymphatics - no palpable lymphadenopathy, no hepatosplenomegaly   Chest - clear to auscultation, no wheezes, rales or rhonchi, symmetric air entry   Heart - normal rate, regular rhythm, normal S1, S2, no murmurs, rubs, clicks or gallops   Abdomen - soft, nontender, nondistended, no masses or organomegaly   Neurological - alert, oriented, normal speech, no focal findings or movement disorder noted   Musculoskeletal - no joint tenderness, deformity or swelling   Extremities - peripheral pulses normal, no pedal edema, no clubbing or cyanosis   Skin - normal coloration and turgor, no rashes, no suspicious skin lesions noted ,      LABORATORY DATA:   No results found for: WBC, HGB, HCT, MCV, PLT, LABLYMP, MID, GRAN, LYMPHOPCT, MIDPERCENT, GRANULOCYTES, RBC, MCH, MCHC, RDW, NEUTOPHILPCT, MONOPCT, EOSPCT, BASOPCT, NEUTROABS, LYMPHSABS, MONOSABS, EOSABS, BASOSABS      Chemistry        Component Value Date/Time    CREATININE 0.87 09/22/2021 1523    No results found for: CALCIUM, ALKPHOS, AST, ALT, BILITOT     PATHOLOGY DATA:   10/1/2021  8:43 AM - [facility], [facility] Incoming Lab Results From [email or web address]    Component Collected Lab   Surgical Pathology Report 09/27/2021 10:13  [address]   -- Diagnosis --   LEFT TONSIL, BIOPSIES:             -  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 IMAGING DATA:    PET CT SKULL BASE TO MID THIGH  Narrative: EXAMINATION:  WHOLE BODY PET/CT  10/6/2021    TECHNIQUE:  Following IV injection of 11.4 mCi of F 18 -FDG, PET  tumor imaging was  acquired from the base of the skull to the mid thighs. Computed tomography  was used for purposes of attenuation correction and anatomic localization. Fusion imaging was utilized for interpretation. Uptake time 59 mins. Glucose level 105 mg/dl. COMPARISON:  CT neck 09/22/2021    HISTORY:  Reason for Exam: Malignant neoplasm of tonsillar fossa  Acuity: Acute  Type of Exam: Initial    FINDINGS:  HEAD/NECK: Redemonstration of left palatine tonsillar lesion which is FDG  avid with max SUV of 12.0. No metabolically active cervical lymphadenopathy.   8 mm posterior right thyroid hypodense nodule which is FDG avid with max SUV  of 6.0. CHEST: Left retrocardiac lower lobe nodular area of opacification measuring 2  cm which demonstrates faint uptake with max SUV of 2.3. No metabolically  active axillary, hilar, or mediastinal lymphadenopathy. ABDOMEN/PELVIS: No metabolically active intraperitoneal mass. No  metabolically active abdominal or pelvic lymphadenopathy. Physiologic  activity in the gastrointestinal and genitourinary systems. BONES/SOFT TISSUE: No abnormal FDG activity localizes to the bones. No  aggressive osseous lesion. INCIDENTAL CT FINDINGS: Multivessel coronary artery calcification. Aortic  atherosclerosis most pronounced in the infrarenal segment. Sigmoid  diverticulosis. Impression: 1. Redemonstration of left palatine tonsillar lesion which is FDG avid  consistent with history of malignancy. 2. Posterior right thyroid hypodense nodule which is FDG avid, recommend  further evaluation with dedicated ultrasound. 3. Left retrocardiac lower lobe nodular area of opacification as measured  above which demonstrates faint uptake and is favored to represent possible  pneumonia or other infectious/inflammatory process though  metastatic/neoplastic etiology not definitively excluded. Recommend post  treatment chest CT in 6-8 weeks to reassess. If persistent at time of  follow-up then further evaluation can be obtained with tissue sampling. ASSESSMENT:    David Roberson is a 77 y.o. pleasant male who was seen during initial consultation visit for newly diagnosed oropharyngeal cancer, p16 positive, clinical stage T2 a N0 M0, stage I disease. In total, I spent greater than 80 minutes in face-to-face consultation with the patient and the majority of this time was spent in education, counseling, and coordination of care.   During our encounter, I personally reviewed the above history and evaluation, including radiology and pathology data, in detail    I reviewed the NCCN guidelines and goals of care with patient. I discussed the care plan with radiation oncologist Dr. Ha Salazar. I explained that patient has early stage disease and as per the NCCN guidelines surgery would be recommended. Based on the final surgical pathology will discuss role of adjuvant radiation/with or without chemotherapy depending on the final surgical pathology. We will refer him to ENT oncology at [facility]. During today's visit, the patient and the family had a number of reasonable questions which were answered to their satisfaction. They verbalized understanding of the information provided and they agreed to proceed as outlined above. PLAN:   Patient would be referred to [facility], ENT surgery for definitive surgery  return to clinic 2 weeks after surgery to discuss surgical pathology results      Charles Sims MD  Hematologist/Medical Oncologist      This note is created with the assistance of a speech recognition program.  While intending to generate a document that actually reflects the content of the visit, the document can still have some errors including those of syntax and sound a like substitutions which may escape proof reading. It such instances, actual meaning can be extrapolated by contextual diversion.     Gabrielle Painting MD

## 2021-10-12 NOTE — PROGRESS NOTES
Referring Physician: Dr. Isac Noriega:    10/12/21 1059   BP: (!) 164/81   Pulse: 70   Resp: 18   Temp: 97.6 °F (36.4 °C)   SpO2: 99%    :  Patient Currently in Pain: Yes  Pain Assessment: 0-10  Pain Level: 2       Wt Readings from Last 1 Encounters:   10/12/21 181 lb (82.1 kg)        Body mass index is 27.52 kg/m². Height: 5' 8\" (172.7 cm)         Immunizations:    Influenza status:    [x]   Current   []   Patient declined    Pneumococcal status:  [x]   Current  []   Patient declined  Covid status:   [x]  Dose #1:                     [x]  Dose #2:               [x]  Dose #3    Smoking Status:    [] Smoker - PPD:   [x] Nonsmoker - Quit Date:               [] Never a smoker      No chief complaint on file. Cancer Staging  No matching staging information was found for the patient. Prior Radiation Therapy? No   If yes, site treated:   Facility:                             Date:    Concurrent Chemo/radiation? No   If yes, start date:    Prior Chemotherapy? No   If yes    Facility:                             Date:    Prior Hormonal Therapy? No   If yes   Facility:                             Date:    Head and Neck Cancer? Yes   If yes, please remind physician to place swallow study order and speech therapy order.       Pacemaker/Defibulator/ICD:  No             BREAST/GYN  Patient only:     LMP:    Age at first Menses:    Para:    :    Cup size:    Lymphedema Evaluation[de-identified]   [] left arm      [] right arm  Location:     Measurement (cm)    Upper Bicep :    Lower Bicep :           Current Outpatient Medications   Medication Sig Dispense Refill    omeprazole (PRILOSEC) 20 MG delayed release capsule Take 20 mg by mouth daily      Multiple Vitamins-Minerals (THERAPEUTIC MULTIVITAMIN-MINERALS) tablet Take 1 tablet by mouth daily      acetaminophen-codeine (TYLENOL #3) 300-30 MG per tablet       simvastatin (ZOCOR) 20 MG tablet Take 1 tablet by mouth nightly 90 tablet 1    lisinopril (PRINIVIL;ZESTRIL) 20 MG tablet Take 1 tablet by mouth daily 30 tablet 1    hydroCHLOROthiazide (HYDRODIURIL) 12.5 MG tablet Take 1 tablet by mouth daily 30 tablet 1     No current facility-administered medications for this encounter. Past Medical History:   Diagnosis Date    Hyperlipidemia     Hypertension        Past Surgical History:   Procedure Laterality Date    ACHILLES TENDON SURGERY      about 30 years ago        Family History   Problem Relation Age of Onset    Coronary Art Dis Mother     Cancer Father        Social History     Socioeconomic History    Marital status:      Spouse name: Not on file    Number of children: Not on file    Years of education: Not on file    Highest education level: Not on file   Occupational History    Not on file   Tobacco Use    Smoking status: Former Smoker     Types: Cigarettes    Smokeless tobacco: Never Used   Vaping Use    Vaping Use: Never used   Substance and Sexual Activity    Alcohol use: Yes     Comment: socially     Drug use: Not Currently    Sexual activity: Yes     Partners: Female   Other Topics Concern    Not on file   Social History Narrative    Not on file     Social Determinants of Health     Financial Resource Strain: Low Risk     Difficulty of Paying Living Expenses: Not hard at all   Food Insecurity: No Food Insecurity    Worried About Running Out of Food in the Last Year: Never true    Judi of Food in the Last Year: Never true   Transportation Needs: No Transportation Needs    Lack of Transportation (Medical): No    Lack of Transportation (Non-Medical):  No   Physical Activity:     Days of Exercise per Week:     Minutes of Exercise per Session:    Stress: No Stress Concern Present    Feeling of Stress : Not at all   Social Connections:     Frequency of Communication with Friends and Family:     Frequency of Social Gatherings with Friends and Family:     Attends Quaker Services:     Active Member of Clubs or Organizations:     Attends Club or Organization Meetings:     Marital Status:    Intimate Partner Violence:     Fear of Current or Ex-Partner:     Emotionally Abused:     Physically Abused:     Sexually Abused:              FALLS RISK SCREEN  Instructions:  Assess the patient and enter the appropriate indicators that are present for fall risk identification. Total the numbers entered and assign a fall risk score from Table 2.  Reassess patient at a minimum every 12 weeks or with status change. Assessment   Date  10/12/2021     1. Mental Ability: confusion/cognitively impaired 0     2. Elimination Issues: incontinence, frequency 0       3. Ambulatory: use of assistive devices (walker, cane, off-loading devices),        attached to equipment (IV pole, oxygen) 0     4. Sensory Limitations: dizziness, vertigo, impaired vision 0     5. Age less than 65        0     6. Age 72 or greater 0     7. Medication: diuretics, strong analgesics, hypnotics, sedatives,        antihypertensive agents 3   8. Falls:  recent history of falls within the last 3 months (not to include slipping or        tripping) 0   TOTAL 3    If score of 4 or greater was education given? No       TABLE 2   Risk Score Risk Level Plan of Care   0-3 Little or  No Risk 1. Provide assistance as indicated for ambulation activities  2. Reorient confused/cognitively impaired patient  3. Call-light/bell within patient's reach  4. Chair/bed in low position, stretcher/bed with siderails up except when performing patient care activities  5. Educate patient/family/caregiver on falls prevention  6.  Reassess in 12 weeks or with any noted change in patient condition which places them at a risk for a fall   4-6 Moderate Risk 1. Provide assistance as indicated for ambulation activities  2. Reorient confused/cognitively impaired patient  3. Call-light/bell within patient's reach  4.   Chair/bed in low position, stretcher/bed with siderails up

## 2021-10-13 ENCOUNTER — TELEPHONE (OUTPATIENT)
Dept: RADIATION ONCOLOGY | Age: 66
End: 2021-10-13

## 2021-10-13 NOTE — TELEPHONE ENCOUNTER
Received call from [facility]. [place] stating they received our referral for pt and they are working on it. No appt set at this time yet.

## 2021-10-18 ENCOUNTER — TELEPHONE (OUTPATIENT)
Dept: ONCOLOGY | Age: 66
End: 2021-10-18

## 2021-10-18 NOTE — TELEPHONE ENCOUNTER
Writer is covering for Raisa Murillo, pt navigator. Pt called writer stating that a referral was placed last week for [facility] and pt hasn't heard anything yet. After reviewing chart, writer notes that pt is registered with [facility] and that last Thursday and Friday they were requesting path specimen and other records. Writer informed pt that he should be getting a call soon and that Raisa Murillo will be back tomorrow and can call her contact at . Pt was appreciative of information.

## 2021-10-19 ENCOUNTER — TELEPHONE (OUTPATIENT)
Dept: ONCOLOGY | Age: 66
End: 2021-10-19

## 2021-10-19 NOTE — TELEPHONE ENCOUNTER
Name: Jeanie Hamilton  : 1955  MRN: [id number]    Oncology Navigation Follow-Up Note    Contact Type:  Telephone  Notes: Upon review of chart noted pt completed dual MO/RO consults & referral placed to Dr. Denise Garcia, \[facility]\""[address]. Spoke with Andrews Alexandra, Dr. Suad Hanson nurse navigator, inquired on referral.  Andrews Alexandra stated will confirm insurance approval & contact pt to schedule. Will continue to follow.     Electronically signed by Abelardo Nails RN on 10/19/2021 at 12:20 PM

## 2021-10-21 ENCOUNTER — TELEPHONE (OUTPATIENT)
Dept: PRIMARY CARE CLINIC | Age: 66
End: 2021-10-21

## 2021-10-21 ENCOUNTER — TELEPHONE (OUTPATIENT)
Dept: ONCOLOGY | Age: 66
End: 2021-10-21

## 2021-10-21 NOTE — TELEPHONE ENCOUNTER
Name: Thania Edwards  : 1955  MRN: [id number]    Oncology Navigation Follow-Up Note    Contact Type:  Telephone  Notes: Pt called in stating completed Dr. Mckenna Garza consult today & Dr. Mckenna Garza does not recommend surgical intervention. Spoke with Cristy Avila, Dr. Danielito Nieto nurse, updated on conversation with pt. Cristy Avila currently with Dr. Mckenna Garza & confirmed surgical intervention not recommended. Cristy Avila stated pt's case to be presented @ [place] [address] H&N tumor board in  & Dr. Mckenna Garza request pt scheduled for f/u with Dr. Saman Rios. Spoke with Dr. Saman Rios updated on pt. Dr. Saman Rios stated [facility] RO  to contact pt tomorrow to coordinate RO teach/sim. Dr. Saman Rios requested writer confirm dental clearance obtained. Spoke with pt updated on conversations with Adalberto Rios. Pt stated dental clearance completed 10/19. Pt stated currently has whitening trays & dentist stated may use trays for fluoride txs. Instructed pt [facility]  will contact tomorrow to coordinate RO teach/sim. Pt verbalized understanding & denied questions/concerns. Dr. Saman Rios updated dental clearance completed. Will continue to follow.      Electronically signed by Pablo Rene RN on 10/21/2021 at 4:09 PM

## 2021-10-21 NOTE — TELEPHONE ENCOUNTER
[facility] calling to inform the doctor that Gaurav Sharlene is scheduled for the ear, nose, and throat ENT for today 10/21/21 at 12pm with Dr. Dayton Soulier

## 2021-10-25 ENCOUNTER — HOSPITAL ENCOUNTER (OUTPATIENT)
Dept: RADIATION ONCOLOGY | Age: 66
Discharge: HOME | End: 2021-10-25
Attending: RADIOLOGY
Payer: MEDICARE

## 2021-10-25 ENCOUNTER — HOSPITAL ENCOUNTER (OUTPATIENT)
Age: 66
Discharge: HOME | End: 2021-10-25
Payer: MEDICARE

## 2021-10-25 DIAGNOSIS — C09.9 SQUAMOUS CELL CARCINOMA OF LEFT TONSIL (HCC): Primary | ICD-10-CM

## 2021-10-25 DIAGNOSIS — C09.9 SQUAMOUS CELL CARCINOMA OF LEFT TONSIL (HCC): ICD-10-CM

## 2021-10-25 LAB
BUN BLDV-MCNC: 10 MG/DL (ref 8–23)
CREAT SERPL-MCNC: 0.79 MG/DL (ref 0.7–1.2)
GFR AFRICAN AMERICAN: >60 ML/MIN
GFR NON-AFRICAN AMERICAN: >60 ML/MIN
GFR SERPL CREATININE-BSD FRML MDRD: NORMAL ML/MIN/{1.73_M2}
GFR SERPL CREATININE-BSD FRML MDRD: NORMAL ML/MIN/{1.73_M2}

## 2021-10-25 PROCEDURE — 36415 COLL VENOUS BLD VENIPUNCTURE: CPT

## 2021-10-25 PROCEDURE — 77334 RADIATION TREATMENT AID(S): CPT | Performed by: RADIOLOGY

## 2021-10-25 PROCEDURE — 82565 ASSAY OF CREATININE: CPT

## 2021-10-25 PROCEDURE — 77263 THER RADIOLOGY TX PLNG CPLX: CPT | Performed by: RADIOLOGY

## 2021-10-25 PROCEDURE — 84520 ASSAY OF UREA NITROGEN: CPT

## 2021-10-29 ENCOUNTER — TELEPHONE (OUTPATIENT)
Dept: ONCOLOGY | Age: 66
End: 2021-10-29

## 2021-11-02 ENCOUNTER — TELEPHONE (OUTPATIENT)
Dept: ONCOLOGY | Age: 66
End: 2021-11-02

## 2021-11-02 NOTE — TELEPHONE ENCOUNTER
Name: Norah Sender  : 1955  MRN: [id number]    Oncology Navigation Follow-Up Note    Contact Type:  Telephone  Notes: Received VM from pt inquiring on XRT start date. Spoke with [facility], [facility] RO dosimetrist, updated on pt's VM. [facility] stated continues to work on [employer] & anticipates XRT start beginning of next week. Pt updated. Pt verbalized understanding, denied further questions/concerns, & thanked writer. Will continue to follow.     Electronically signed by Gabo Frank RN on 2021 at 9:40 AM

## 2021-11-02 NOTE — TELEPHONE ENCOUNTER
[address] [place] Nutrition Note    PGSGA scoring tool reviewed with score <4. Automatic nutrition assessment consult populates from PGSGA tool for scores > 4. Will continue to follow as requested.     MARK Douglas, RD, LD  Registered Dietitian  [facility]  [phone]

## 2021-11-03 ENCOUNTER — HOSPITAL ENCOUNTER (OUTPATIENT)
Dept: RADIATION ONCOLOGY | Age: 66
Discharge: HOME | End: 2021-11-03
Attending: RADIOLOGY
Payer: MEDICARE

## 2021-11-03 PROCEDURE — 77300 RADIATION THERAPY DOSE PLAN: CPT | Performed by: RADIOLOGY

## 2021-11-03 PROCEDURE — 77338 DESIGN MLC DEVICE FOR IMRT: CPT | Performed by: RADIOLOGY

## 2021-11-03 PROCEDURE — 77301 RADIOTHERAPY DOSE PLAN IMRT: CPT | Performed by: RADIOLOGY

## 2021-11-09 ENCOUNTER — TELEPHONE (OUTPATIENT)
Dept: ONCOLOGY | Age: 66
End: 2021-11-09

## 2021-11-09 ENCOUNTER — OFFICE VISIT (OUTPATIENT)
Dept: ONCOLOGY | Age: 66
End: 2021-11-09
Payer: MEDICARE

## 2021-11-09 ENCOUNTER — HOSPITAL ENCOUNTER (OUTPATIENT)
Dept: RADIATION ONCOLOGY | Age: 66
Discharge: HOME | End: 2021-11-09
Attending: RADIOLOGY
Payer: MEDICARE

## 2021-11-09 VITALS
SYSTOLIC BLOOD PRESSURE: 119 MMHG | WEIGHT: 180.6 LBS | HEART RATE: 72 BPM | BODY MASS INDEX: 27.46 KG/M2 | TEMPERATURE: 97.6 F | DIASTOLIC BLOOD PRESSURE: 76 MMHG

## 2021-11-09 DIAGNOSIS — C10.9 OROPHARYNGEAL CANCER (HCC): ICD-10-CM

## 2021-11-09 DIAGNOSIS — E04.1 THYROID NODULE: Primary | ICD-10-CM

## 2021-11-09 PROCEDURE — 99214 OFFICE O/P EST MOD 30 MIN: CPT | Performed by: INTERNAL MEDICINE

## 2021-11-09 PROCEDURE — 3017F COLORECTAL CA SCREEN DOC REV: CPT | Performed by: INTERNAL MEDICINE

## 2021-11-09 PROCEDURE — G8417 CALC BMI ABV UP PARAM F/U: HCPCS | Performed by: INTERNAL MEDICINE

## 2021-11-09 PROCEDURE — 1036F TOBACCO NON-USER: CPT | Performed by: INTERNAL MEDICINE

## 2021-11-09 PROCEDURE — 4040F PNEUMOC VAC/ADMIN/RCVD: CPT | Performed by: INTERNAL MEDICINE

## 2021-11-09 PROCEDURE — 77014 PR CT GUIDANCE PLACEMENT RAD THERAPY FIELDS: CPT | Performed by: RADIOLOGY

## 2021-11-09 PROCEDURE — 99211 OFF/OP EST MAY X REQ PHY/QHP: CPT | Performed by: INTERNAL MEDICINE

## 2021-11-09 PROCEDURE — 1123F ACP DISCUSS/DSCN MKR DOCD: CPT | Performed by: INTERNAL MEDICINE

## 2021-11-09 PROCEDURE — 77386 HC NTSTY MODUL RAD TX DLVR CPLX: CPT | Performed by: RADIOLOGY

## 2021-11-09 PROCEDURE — G8482 FLU IMMUNIZE ORDER/ADMIN: HCPCS | Performed by: INTERNAL MEDICINE

## 2021-11-09 PROCEDURE — G8427 DOCREV CUR MEDS BY ELIG CLIN: HCPCS | Performed by: INTERNAL MEDICINE

## 2021-11-09 NOTE — TELEPHONE ENCOUNTER
AVS from 11/9/21    US thyroid 1-2 week  RT as planned  rTc 4-5 weeks    US scheduled 11/16/21 @ 10:30am    RV scheduled 12/10/21 @ 10:15am    PT was given AVS and an appt schedule    Electronically signed by Sobeida Thurston on 11/9/2021 at 3:16 PM

## 2021-11-09 NOTE — TELEPHONE ENCOUNTER
Name: Jake Dixon  : 1955  MRN: [id number]    Oncology Navigation Follow-Up Note    Contact Type:  Telephone  Notes: Upon review of chart noted pt completed Dr. Tico Gomez f/u & XRT start today. \"Who to Call When\" document mailed to pt. Will continue to follow.     Electronically signed by Marquis Bar RN on 2021 at 11:14 AM

## 2021-11-09 NOTE — PROGRESS NOTES
Patient ID: So George, 1955, [id number], 77 y.o. Referred by : Renetta Ojeda MD  Diagnosis:   Oropharyngeal carcin shirley, left tonsil squamous cell carcinoma, p16 positive, clinical stage T2 a N0 M0, stage I disease     Cancer Staging  Oropharyngeal cancer ([facility] [address]  Staging form: Pharynx - HPV-Mediated Oropharynx, AJCC 8th Edition  - Clinical stage from 10/12/2021: Stage I (cT2, cN0, cM0) - Signed by Nelson Yusuf MD on 10/12/2021    Patient has been evaluated by ENT surgical oncology at [facility] and as per the tumor board recommendation definitive radiation therapy was recommended   Patient started on definitive radiation therapy on 11/9/2021     HISTORY OF PRESENT ILLNESS:    Oncologic History:  So George is 77 y.o. male was seen during initial consultation with for newly diagnosed left tonsillar/oropharyngeal carcinoma. Patient initially presented with sore throat/throat pain in May of this year and was referred to ENT for further evaluation. His ENT evaluation did show 3 cm left tonsillar mass limited to tonsils only. Patient had a CT of the neck on 9/22/2021 which showed 3.2 cm mass in the left palatine tonsil without any abnormal lymphadenopathy. Patient was evaluated by ENT and had a biopsy of the left tonsil which showed poorly differentiated invasive squamous cell carcinoma, p16 positive. Subsequently patient had a PET scan on 10/6/2021 which showed no distant metastasis and no lymph node metastasis noted. Patient has been evaluated by radiation oncology today. He denies any unintentional weight loss, drenching night sweats fever chills. INTERVAL HISTORY  Patient returns for follow-up visit and to discuss lab results, imaging results, and further recommendations. He started on radiation therapy and tolerating well so far. He denies any nausea vomiting. He is eating and drinking well. He denies any chest pain, shortness of breath.     During this visit patient's allergy, social, medical, surgical history and medications were reviewed and updated. Past Medical History:   Diagnosis Date    Hyperlipidemia     Hypertension        Past Surgical History:   Procedure Laterality Date    ACHILLES TENDON SURGERY      about 30 years ago        Allergies   Allergen Reactions    Seasonal      Runny nose scratchy throat       Current Outpatient Medications   Medication Sig Dispense Refill    acetaminophen-codeine (TYLENOL #3) 300-30 MG per tablet       omeprazole (PRILOSEC) 20 MG delayed release capsule Take 20 mg by mouth daily      Multiple Vitamins-Minerals (THERAPEUTIC MULTIVITAMIN-MINERALS) tablet Take 1 tablet by mouth daily      simvastatin (ZOCOR) 20 MG tablet Take 1 tablet by mouth nightly 90 tablet 1    lisinopril (PRINIVIL;ZESTRIL) 20 MG tablet Take 1 tablet by mouth daily 30 tablet 1    hydroCHLOROthiazide (HYDRODIURIL) 12.5 MG tablet Take 1 tablet by mouth daily 30 tablet 1     No current facility-administered medications for this visit.        Social History     Socioeconomic History    Marital status:      Spouse name: Not on file    Number of children: Not on file    Years of education: Not on file    Highest education level: Not on file   Occupational History    Not on file   Tobacco Use    Smoking status: Former Smoker     Types: Cigarettes    Smokeless tobacco: Never Used   Vaping Use    Vaping Use: Never used   Substance and Sexual Activity    Alcohol use: Yes     Comment: socially     Drug use: Not Currently    Sexual activity: Yes     Partners: Female   Other Topics Concern    Not on file   Social History Narrative    Not on file     Social Determinants of Health     Financial Resource Strain: Low Risk     Difficulty of Paying Living Expenses: Not hard at all   Food Insecurity: No Food Insecurity    Worried About [address] [employer] in the Last Year: Never true    Judi of Food in the Last Year: Never true   Transportation Needs: No intolerance, tremor, weight changes, change in bowel habits and hair loss   Musculoskeletal: negative for myalgias, arthralgias, pain, joint swelling,and bone pain   Neurological: negative for headaches, dizziness, seizures, weakness, numbness       OBJECTIVE:         Vitals:    11/09/21 1026   BP: 119/76   Pulse: 72   Temp: 97.6 °F (36.4 °C)       PHYSICAL EXAM:   General appearance - well appearing, no in pain or distress   Mental status - alert and cooperative   Eyes - pupils equal and reactive, extraocular eye movements intact   Ears - bilateral TM's and external ear canals normal   Mouth - mucous membranes moist, pharynx normal without lesions   Neck - supple, no significant adenopathy   Lymphatics - no palpable lymphadenopathy, no hepatosplenomegaly   Chest - clear to auscultation, no wheezes, rales or rhonchi, symmetric air entry   Heart - normal rate, regular rhythm, normal S1, S2, no murmurs, rubs, clicks or gallops   Abdomen - soft, nontender, nondistended, no masses or organomegaly   Neurological - alert, oriented, normal speech, no focal findings or movement disorder noted   Musculoskeletal - no joint tenderness, deformity or swelling   Extremities - peripheral pulses normal, no pedal edema, no clubbing or cyanosis   Skin - normal coloration and turgor, no rashes, no suspicious skin lesions noted ,      LABORATORY DATA:   No results found for: WBC, HGB, HCT, MCV, PLT, LABLYMP, MID, GRAN, LYMPHOPCT, MIDPERCENT, GRANULOCYTES, RBC, MCH, MCHC, RDW, NEUTOPHILPCT, MONOPCT, EOSPCT, BASOPCT, NEUTROABS, LYMPHSABS, MONOSABS, EOSABS, BASOSABS      Chemistry        Component Value Date/Time    BUN 10 10/25/2021 1000    CREATININE 0.79 10/25/2021 1000    No results found for: CALCIUM, ALKPHOS, AST, ALT, BILITOT     PATHOLOGY DATA:   10/1/2021  8:43 AM - August Pierson Incoming Lab Results From [facility]    Component Collected Lab   Surgical Pathology Report 09/27/2021 10:13  Reji Cuello   -- Diagnosis -- LEFT TONSIL, BIOPSIES:             -  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 IMAGING DATA:    PET CT SKULL BASE TO MID THIGH  Narrative: EXAMINATION:  WHOLE BODY PET/CT  10/6/2021    TECHNIQUE:  Following IV injection of 11.4 mCi of F 18 -FDG, PET  tumor imaging was  acquired from the base of the skull to the mid thighs. Computed tomography  was used for purposes of attenuation correction and anatomic localization. Fusion imaging was utilized for interpretation. Uptake time 59 mins. Glucose level 105 mg/dl. COMPARISON:  CT neck 09/22/2021    HISTORY:  Reason for Exam: Malignant neoplasm of tonsillar fossa  Acuity: Acute  Type of Exam: Initial    FINDINGS:  HEAD/NECK: Redemonstration of left palatine tonsillar lesion which is FDG  avid with max SUV of 12.0. No metabolically active cervical lymphadenopathy. 8 mm posterior right thyroid hypodense nodule which is FDG avid with max SUV  of 6.0. CHEST: Left retrocardiac lower lobe nodular area of opacification measuring 2  cm which demonstrates faint uptake with max SUV of 2.3. No metabolically  active axillary, hilar, or mediastinal lymphadenopathy. ABDOMEN/PELVIS: No metabolically active intraperitoneal mass. No  metabolically active abdominal or pelvic lymphadenopathy. Physiologic  activity in the gastrointestinal and genitourinary systems. BONES/SOFT TISSUE: No abnormal FDG activity localizes to the bones. No  aggressive osseous lesion. INCIDENTAL CT FINDINGS: Multivessel coronary artery calcification. Aortic  atherosclerosis most pronounced in the infrarenal segment. Sigmoid  diverticulosis. Impression: 1. Redemonstration of left palatine tonsillar lesion which is FDG avid  consistent with history of malignancy.   2. Posterior right thyroid hypodense nodule which is FDG avid, recommend  further evaluation with dedicated ultrasound. 3. Left retrocardiac lower lobe nodular area of opacification as measured  above which demonstrates faint uptake and is favored to represent possible  pneumonia or other infectious/inflammatory process though  metastatic/neoplastic etiology not definitively excluded. Recommend post  treatment chest CT in 6-8 weeks to reassess. If persistent at time of  follow-up then further evaluation can be obtained with tissue sampling. ASSESSMENT:    Thania Edwards is a 77 y.o. pleasant male who was seen during initial consultation visit for newly diagnosed oropharyngeal cancer, p16 positive, clinical stage T2 a N0 M0, stage I disease. I reviewed the NCCN guidelines and goals of care with patient. I discussed the care plan with radiation oncologist Dr. Saman Rios. I explained that patient has early stage disease and as per the NCCN guidelines surgery would be recommended. He was evaluated by ENT at [facility] and recommended definitive radiation therapy. No indication for chemotherapy at this point. Thyroid nodule    During today's visit, the patient and the family had a number of reasonable questions which were answered to their satisfaction. They verbalized understanding of the information provided and they agreed to proceed as outlined above. PLAN:   I reviewed the laboratory data, imaging studies, outside records and discuss treatment recommendations  Continue radiation therapy as planned  I will get ultrasound thyroid to evaluate thyroid nodule  Return to clinic in 4 to 5 weeks or earlier if needed      Charles Crockett MD  Hematologist/Medical Oncologist      This note is created with the assistance of a speech recognition program.  While intending to generate a document that actually reflects the content of the visit, the document can still have some errors including those of syntax and sound a like substitutions which may escape proof reading.   It such instances, actual meaning can be extrapolated by contextual diversion.     Liss Rahman MD

## 2021-11-10 ENCOUNTER — HOSPITAL ENCOUNTER (OUTPATIENT)
Dept: RADIATION ONCOLOGY | Age: 66
Discharge: HOME | End: 2021-11-10
Attending: RADIOLOGY
Payer: MEDICARE

## 2021-11-10 VITALS
OXYGEN SATURATION: 98 % | TEMPERATURE: 97 F | DIASTOLIC BLOOD PRESSURE: 99 MMHG | BODY MASS INDEX: 27.31 KG/M2 | RESPIRATION RATE: 14 BRPM | SYSTOLIC BLOOD PRESSURE: 196 MMHG | HEART RATE: 88 BPM | WEIGHT: 179.6 LBS

## 2021-11-10 PROCEDURE — 77014 PR CT GUIDANCE PLACEMENT RAD THERAPY FIELDS: CPT | Performed by: RADIOLOGY

## 2021-11-10 PROCEDURE — 77386 HC NTSTY MODUL RAD TX DLVR CPLX: CPT | Performed by: RADIOLOGY

## 2021-11-10 NOTE — PROGRESS NOTES
Sarah Buchanan  11/10/2021  Wt Readings from Last 3 Encounters:   11/10/21 179 lb 9.6 oz (81.5 kg)   11/09/21 180 lb 9.6 oz (81.9 kg)   10/12/21 181 lb (82.1 kg)     Body mass index is 27.31 kg/m². Treatment Area:L tonsil     Patient was seen today for weekly visit. Comfort Alteration  Fatigue: None    Mucous Membrane Alteration  Mucositis Due to Radiation: No  Thrush: No  Voice Changes: No    Nutritional Alteration  Anorexia: No   Nausea: No   Vomiting: No     Ventilation Alteration  Cough:No    Skin Alteration   Sensation:intact     Radiation Dermatitis:  Intact [x]     Erythema  []     Discoloration  []     Rash []     Dry desquamation  []     Moist desquamation []       Emotional  Coping: effective      Injury, potential bleeding or infection: oral and skin care reviewed     No results found for: WBC, PLT      BP (!) 196/99   Pulse 88   Temp 97 °F (36.1 °C)   Resp 14   Wt 179 lb 9.6 oz (81.5 kg)   SpO2 98%   BMI 27.31 kg/m²   Patient Currently in Pain: Denies               Assessment/Plan: Patient was seen today for weekly visit. Dr Maria Esther Kasper notified and examined pt.        Elena Cotton RN

## 2021-11-10 NOTE — PROGRESS NOTES
[address]       Radiation Oncology          [address] [facility]          [place]        Linette Carver: [phone]        F: [phone]       [facility] [address]       Radiation Oncology   [address] [facility], [address] [facility]       Linette Carver: [phone]       F: [phone]       [email or web address]          RADIATION ONCOLOGY WEEKLY PROGRESS NOTE  Patient ID:   Júnior Tripathi  : 1955   MRN: [id number]    DIAGNOSIS:  Cancer Staging  Oropharyngeal cancer ([facility] [address]  Staging form: Pharynx - HPV-Mediated Oropharynx, AJCC 8th Edition  - Clinical stage from 10/12/2021: Stage I (cT2, cN0, cM0) - Signed by Ru Strauss MD on 10/12/2021      TREATMENT DETAILS:  Treatment Site: L Tonsil and LNs  Actual Dose: 400cGy  Total Planned Dose: 7000cGy  Treatment Technique: IMRT  Fraction Technique: Daily  Therapy imaging monitoring: CBCT daily  Concurrent Chemotherapy: NA    SUBJECTIVE:   Patient seen for their weekly on treatment evaluation today. Overall patient is doing well without any acute issues yet. He is eating well and denies any fatigue. OBJECTIVE:   CHAPERONE: Not Required    ECO Asymptomatic    VITAL SIGNS: BP (!) 196/99   Pulse 88   Temp 97 °F (36.1 °C)   Resp 14   Wt 179 lb 9.6 oz (81.5 kg)   SpO2 98%   BMI 27.31 kg/m²   Wt Readings from Last 5 Encounters:   11/10/21 179 lb 9.6 oz (81.5 kg)   21 180 lb 9.6 oz (81.9 kg)   10/12/21 181 lb (82.1 kg)   10/12/21 181 lb (82.1 kg)   21 182 lb 6.4 oz (82.7 kg)     GENERAL:  General appearance is that of a well-nourished, well-developed in no apparent distress. HEENT: Normocephalic, atraumatic, EOMI, moist mucosa, (+) L Tonsil erythema. NECK:  No adenopathy or a palpable thyroid mass, trachea is midline. EXTREMITIES:  No edema. No calf tenderness. MSK:  No spinal tenderness. Normal ROM. NEUROLOGICAL: Alert and oriented.  Strength and sensation intact bilaterally. No focal deficits. PSYCH: Mood normal, behavior normal.  SKIN: No erythema, no desquamation. LABS:  CREATININE   Date Value Ref Range Status   10/25/2021 0.79 0.70 - 1.20 mg/dL Final   09/22/2021 0.87 0.70 - 1.20 mg/dL Final     No results found for: , CEA  No results found for: PSA    MEDICATIONS:    Current Outpatient Medications:     acetaminophen-codeine (TYLENOL #3) 300-30 MG per tablet, , Disp: , Rfl:     omeprazole (PRILOSEC) 20 MG delayed release capsule, Take 20 mg by mouth daily, Disp: , Rfl:     Multiple Vitamins-Minerals (THERAPEUTIC MULTIVITAMIN-MINERALS) tablet, Take 1 tablet by mouth daily, Disp: , Rfl:     simvastatin (ZOCOR) 20 MG tablet, Take 1 tablet by mouth nightly, Disp: 90 tablet, Rfl: 1    lisinopril (PRINIVIL;ZESTRIL) 20 MG tablet, Take 1 tablet by mouth daily, Disp: 30 tablet, Rfl: 1    hydroCHLOROthiazide (HYDRODIURIL) 12.5 MG tablet, Take 1 tablet by mouth daily, Disp: 30 tablet, Rfl: 1      ASSESSMENT PLAN:   Treatment setup and plan reviewed. Port images/CBCT images reviewed. Appropriate laboratory work was reviewed. Treatment side effects and toxicities reviewed with the patient, and appropriate management was advised. Will continue radiation treatment as planned, and recommend patient contact us if they have any questions or concerns. Continue managing hydration and nutrition. Electronically signed by Sergio Silvestre MD on 11/10/2021 at 2:05 PM      Drugs Prescribed:  New Prescriptions    No medications on file       Other Orders Placed:  No orders of the defined types were placed in this encounter.

## 2021-11-11 ENCOUNTER — HOSPITAL ENCOUNTER (OUTPATIENT)
Dept: RADIATION ONCOLOGY | Age: 66
Discharge: HOME | End: 2021-11-11
Attending: RADIOLOGY
Payer: MEDICARE

## 2021-11-11 PROCEDURE — 77014 PR CT GUIDANCE PLACEMENT RAD THERAPY FIELDS: CPT | Performed by: RADIOLOGY

## 2021-11-11 PROCEDURE — 77386 HC NTSTY MODUL RAD TX DLVR CPLX: CPT | Performed by: RADIOLOGY

## 2021-11-12 ENCOUNTER — HOSPITAL ENCOUNTER (OUTPATIENT)
Dept: RADIATION ONCOLOGY | Age: 66
Discharge: HOME | End: 2021-11-12
Attending: RADIOLOGY
Payer: MEDICARE

## 2021-11-12 PROCEDURE — 77386 HC NTSTY MODUL RAD TX DLVR CPLX: CPT | Performed by: RADIOLOGY

## 2021-11-12 PROCEDURE — 77014 PR CT GUIDANCE PLACEMENT RAD THERAPY FIELDS: CPT | Performed by: RADIOLOGY

## 2021-11-15 ENCOUNTER — HOSPITAL ENCOUNTER (OUTPATIENT)
Dept: RADIATION ONCOLOGY | Age: 66
Discharge: HOME | End: 2021-11-15
Attending: RADIOLOGY
Payer: MEDICARE

## 2021-11-15 PROCEDURE — 77014 PR CT GUIDANCE PLACEMENT RAD THERAPY FIELDS: CPT | Performed by: RADIOLOGY

## 2021-11-15 PROCEDURE — 77336 RADIATION PHYSICS CONSULT: CPT | Performed by: RADIOLOGY

## 2021-11-15 PROCEDURE — 77386 HC NTSTY MODUL RAD TX DLVR CPLX: CPT | Performed by: RADIOLOGY

## 2021-11-16 ENCOUNTER — HOSPITAL ENCOUNTER (OUTPATIENT)
Dept: ULTRASOUND IMAGING | Age: 66
Discharge: HOME | End: 2021-11-18
Payer: MEDICARE

## 2021-11-16 ENCOUNTER — HOSPITAL ENCOUNTER (OUTPATIENT)
Dept: RADIATION ONCOLOGY | Age: 66
Discharge: HOME | End: 2021-11-16
Attending: RADIOLOGY
Payer: MEDICARE

## 2021-11-16 DIAGNOSIS — E04.1 THYROID NODULE: ICD-10-CM

## 2021-11-16 PROCEDURE — 77014 PR CT GUIDANCE PLACEMENT RAD THERAPY FIELDS: CPT | Performed by: RADIOLOGY

## 2021-11-16 PROCEDURE — 76536 US EXAM OF HEAD AND NECK: CPT

## 2021-11-16 PROCEDURE — 77386 HC NTSTY MODUL RAD TX DLVR CPLX: CPT | Performed by: RADIOLOGY

## 2021-11-17 ENCOUNTER — HOSPITAL ENCOUNTER (OUTPATIENT)
Dept: RADIATION ONCOLOGY | Age: 66
Discharge: HOME | End: 2021-11-17
Attending: RADIOLOGY
Payer: MEDICARE

## 2021-11-17 VITALS — BODY MASS INDEX: 27.73 KG/M2 | WEIGHT: 182.4 LBS

## 2021-11-17 PROCEDURE — 77386 HC NTSTY MODUL RAD TX DLVR CPLX: CPT | Performed by: RADIOLOGY

## 2021-11-17 PROCEDURE — 77014 PR CT GUIDANCE PLACEMENT RAD THERAPY FIELDS: CPT | Performed by: RADIOLOGY

## 2021-11-17 NOTE — PROGRESS NOTES
[address]       Radiation Oncology          [address] [facility]          [place], [address]        Jack Braun: [phone]        F: [phone]       [facility] [address] [facility]       Radiation Oncology   [address] [facility], [address] [facility]       Jack Braun: [phone]       F: [phone]       [email or web address]          RADIATION ONCOLOGY WEEKLY PROGRESS NOTE  Patient ID:   Christine Ahn  : 1955   MRN: [id number]    DIAGNOSIS:  Cancer Staging  Oropharyngeal cancer ([facility] [address]  Staging form: Pharynx - HPV-Mediated Oropharynx, AJCC 8th Edition  - Clinical stage from 10/12/2021: Stage I (cT2, cN0, cM0) - Signed by Russel Win MD on 10/12/2021      TREATMENT DETAILS:  Treatment Site: L Tonsil and LNs  Actual Dose: 1400cGy  Total Planned Dose: 7000cGy  Treatment Technique: IMRT  Fraction Technique: Daily  Therapy imaging monitoring: CBCT daily  Concurrent Chemotherapy: NA    SUBJECTIVE:   Patient seen for their weekly on treatment evaluation today. Overall patient is doing well without any acute issues. He does report some increased soreness in the throat but is tolerating it with using Tylenol. He is eating well and denies any fatigue. OBJECTIVE:   CHAPERONE: Not Required    ECO Asymptomatic    VITAL SIGNS: Wt 182 lb 6.4 oz (82.7 kg)   BMI 27.73 kg/m²   Wt Readings from Last 5 Encounters:   21 182 lb 6.4 oz (82.7 kg)   11/10/21 179 lb 9.6 oz (81.5 kg)   21 180 lb 9.6 oz (81.9 kg)   10/12/21 181 lb (82.1 kg)   10/12/21 181 lb (82.1 kg)     GENERAL:  General appearance is that of a well-nourished, well-developed in no apparent distress. HEENT: Normocephalic, atraumatic, EOMI, moist mucosa, (+) L Tonsil erythema. NECK:  No adenopathy or a palpable thyroid mass, trachea is midline. EXTREMITIES:  No edema. No calf tenderness. MSK:  No spinal tenderness. Normal ROM. NEUROLOGICAL: Alert and oriented.  Strength and sensation intact bilaterally. No focal deficits. PSYCH: Mood normal, behavior normal.  SKIN: No erythema, no desquamation. LABS:  CREATININE   Date Value Ref Range Status   10/25/2021 0.79 0.70 - 1.20 mg/dL Final   09/22/2021 0.87 0.70 - 1.20 mg/dL Final     No results found for: , CEA  No results found for: PSA    MEDICATIONS:    Current Outpatient Medications:     acetaminophen-codeine (TYLENOL #3) 300-30 MG per tablet, , Disp: , Rfl:     omeprazole (PRILOSEC) 20 MG delayed release capsule, Take 20 mg by mouth daily, Disp: , Rfl:     Multiple Vitamins-Minerals (THERAPEUTIC MULTIVITAMIN-MINERALS) tablet, Take 1 tablet by mouth daily, Disp: , Rfl:     simvastatin (ZOCOR) 20 MG tablet, Take 1 tablet by mouth nightly, Disp: 90 tablet, Rfl: 1    lisinopril (PRINIVIL;ZESTRIL) 20 MG tablet, Take 1 tablet by mouth daily, Disp: 30 tablet, Rfl: 1    hydroCHLOROthiazide (HYDRODIURIL) 12.5 MG tablet, Take 1 tablet by mouth daily, Disp: 30 tablet, Rfl: 1      ASSESSMENT PLAN:   Treatment setup and plan reviewed. Port images/CBCT images reviewed. Appropriate laboratory work was reviewed. Treatment side effects and toxicities reviewed with the patient, and appropriate management was advised. Will continue radiation treatment as planned, and recommend patient contact us if they have any questions or concerns. Continue managing hydration and nutrition. Recommend patient using his Tylenol with codeine as well as ibuprofen if needed. Electronically signed by Tsering Moon MD on 11/17/2021 at 2:14 PM      Drugs Prescribed:  New Prescriptions    No medications on file       Other Orders Placed:  No orders of the defined types were placed in this encounter.

## 2021-11-18 ENCOUNTER — HOSPITAL ENCOUNTER (OUTPATIENT)
Dept: RADIATION ONCOLOGY | Age: 66
Discharge: HOME | End: 2021-11-18
Attending: RADIOLOGY
Payer: MEDICARE

## 2021-11-18 PROCEDURE — 77386 HC NTSTY MODUL RAD TX DLVR CPLX: CPT | Performed by: RADIOLOGY

## 2021-11-18 PROCEDURE — 77014 PR CT GUIDANCE PLACEMENT RAD THERAPY FIELDS: CPT | Performed by: RADIOLOGY

## 2021-11-18 RX ORDER — HYDROCHLOROTHIAZIDE 12.5 MG/1
12.5 TABLET ORAL DAILY
Qty: 60 TABLET | Refills: 5 | Status: SHIPPED | OUTPATIENT
Start: 2021-11-18 | End: 2022-11-04 | Stop reason: SDUPTHER

## 2021-11-18 RX ORDER — LISINOPRIL 20 MG/1
20 TABLET ORAL DAILY
Qty: 60 TABLET | Refills: 5 | Status: ON HOLD | OUTPATIENT
Start: 2021-11-18 | End: 2022-05-27 | Stop reason: HOSPADM

## 2021-11-19 ENCOUNTER — HOSPITAL ENCOUNTER (OUTPATIENT)
Dept: RADIATION ONCOLOGY | Age: 66
Discharge: HOME | End: 2021-11-19
Attending: RADIOLOGY
Payer: MEDICARE

## 2021-11-19 PROCEDURE — 77014 PR CT GUIDANCE PLACEMENT RAD THERAPY FIELDS: CPT | Performed by: RADIOLOGY

## 2021-11-19 PROCEDURE — 77386 HC NTSTY MODUL RAD TX DLVR CPLX: CPT | Performed by: RADIOLOGY

## 2021-11-21 ENCOUNTER — HOSPITAL ENCOUNTER (OUTPATIENT)
Dept: RADIATION ONCOLOGY | Age: 66
Discharge: HOME | End: 2021-11-21
Attending: RADIOLOGY
Payer: MEDICARE

## 2021-11-21 PROCEDURE — 77014 PR CT GUIDANCE PLACEMENT RAD THERAPY FIELDS: CPT | Performed by: RADIOLOGY

## 2021-11-21 PROCEDURE — 77427 RADIATION TX MANAGEMENT X5: CPT | Performed by: RADIOLOGY

## 2021-11-21 PROCEDURE — 77386 HC NTSTY MODUL RAD TX DLVR CPLX: CPT | Performed by: RADIOLOGY

## 2021-11-22 ENCOUNTER — HOSPITAL ENCOUNTER (OUTPATIENT)
Dept: RADIATION ONCOLOGY | Age: 66
Discharge: HOME | End: 2021-11-22
Attending: RADIOLOGY
Payer: MEDICARE

## 2021-11-22 PROCEDURE — 77014 PR CT GUIDANCE PLACEMENT RAD THERAPY FIELDS: CPT | Performed by: RADIOLOGY

## 2021-11-22 PROCEDURE — 77386 HC NTSTY MODUL RAD TX DLVR CPLX: CPT | Performed by: RADIOLOGY

## 2021-11-23 ENCOUNTER — HOSPITAL ENCOUNTER (OUTPATIENT)
Dept: RADIATION ONCOLOGY | Age: 66
Discharge: HOME | End: 2021-11-23
Attending: RADIOLOGY
Payer: MEDICARE

## 2021-11-23 ENCOUNTER — HOSPITAL ENCOUNTER (OUTPATIENT)
Dept: SPEECH THERAPY | Age: 66
Setting detail: THERAPIES SERIES
Discharge: HOME | End: 2021-11-23
Attending: RADIOLOGY
Payer: MEDICARE

## 2021-11-23 PROCEDURE — 77386 HC NTSTY MODUL RAD TX DLVR CPLX: CPT | Performed by: RADIOLOGY

## 2021-11-23 PROCEDURE — 92526 ORAL FUNCTION THERAPY: CPT

## 2021-11-23 PROCEDURE — 77014 PR CT GUIDANCE PLACEMENT RAD THERAPY FIELDS: CPT | Performed by: RADIOLOGY

## 2021-11-23 PROCEDURE — 77336 RADIATION PHYSICS CONSULT: CPT | Performed by: RADIOLOGY

## 2021-11-23 NOTE — PROGRESS NOTES
Speech Language Pathology    Pt. Problem: Undergoing radiation therapy for head and neck CA,    S: Lovely Simeon was seen by speech language pathology today after a referral from Dr. Chandra Krueger to train in range of motion and strengthening exercises of the tongue, jaw and larynx. Lovely Simeon is beginning concurrent chemo/RT for head and neck cancer (L tonsil SCC). Pt w/ mild swallowing deficits characterized by odynophagia and dysgeusia, pain rated at ~4/10. However, pt reports pain when swallowing has been inconsistent, providing the example that last Friday pt's pain with swallowing was ~7/10, but today is tolerable. Pt reports he takes tylenol to help with pain/soreness. Pt currently does not have a PEG tube and indicates he wants to attempt to maintain nutrition/weight with PO intake only. Pt and ST reviewed benefits of PO t/o concurrent chemo/RT related to swallowing mechanism, however ST also emphasized importance of nutrition and weight maintenance. Pt verbalized understanding. Pt additionally indicates dysgeusia is tolerable with PO intake, relates he us hoping to find certain foods/drinks that taste better than others. O/A: Upon evaluation, Lovely Simeon presents with functional labial and lingual range of motion and strength. Lovely Simeon speech is 100% intelligible. Lovely Simeon was provided with information and potential swallowing difficulties were discussed related to radiation treatment. An oral motor range of motion program was provided and explained in detail to Lovely Simeon. The program was completed during the session with the supervision of the SLP. Lovely Simeon verbalized understanding and returned demonstration of each exercise. Written copy of OM/EX with illustrations provided to pt. Pt also provided education re: lymphedema s/s, overview, treatment options, and who to contact if s/s of lymphedema occur. Written copy of education provided to pt.  Pt encouraged to call/reach out w/ any questions. P: It is recommended Valarie Peterson complete this program independently with the provided instructions 3-5 times daily prior to, throughout and 3-6 months following radiation treatment. Goal: Independent and preventative exercises to maximize swallow function throughout radiation therapy.     Thank you for this referral.     Roger Pals, SLP

## 2021-11-24 ENCOUNTER — HOSPITAL ENCOUNTER (OUTPATIENT)
Dept: RADIATION ONCOLOGY | Age: 66
Discharge: HOME | End: 2021-11-24
Attending: RADIOLOGY
Payer: MEDICARE

## 2021-11-24 VITALS
SYSTOLIC BLOOD PRESSURE: 144 MMHG | WEIGHT: 178 LBS | OXYGEN SATURATION: 98 % | BODY MASS INDEX: 27.06 KG/M2 | DIASTOLIC BLOOD PRESSURE: 80 MMHG | RESPIRATION RATE: 14 BRPM | TEMPERATURE: 98 F | HEART RATE: 82 BPM

## 2021-11-24 PROCEDURE — 77386 HC NTSTY MODUL RAD TX DLVR CPLX: CPT | Performed by: RADIOLOGY

## 2021-11-24 PROCEDURE — 77014 PR CT GUIDANCE PLACEMENT RAD THERAPY FIELDS: CPT | Performed by: RADIOLOGY

## 2021-11-24 ASSESSMENT — PAIN DESCRIPTION - LOCATION: LOCATION: THROAT

## 2021-11-24 ASSESSMENT — PAIN SCALES - GENERAL: PAINLEVEL_OUTOF10: 1

## 2021-11-24 NOTE — PROGRESS NOTES
Júnior Beams  11/24/2021  Wt Readings from Last 3 Encounters:   11/24/21 178 lb (80.7 kg)   11/17/21 182 lb 6.4 oz (82.7 kg)   11/10/21 179 lb 9.6 oz (81.5 kg)     Body mass index is 27.06 kg/m². Treatment Area: tonsil     Patient was seen today for weekly visit. Comfort Alteration  Fatigue: Mild    Mucous Membrane Alteration  Mucositis Due to Radiation: No  Thrush: No  Voice Changes: No    Nutritional Alteration  Anorexia: No   Nausea: No   Vomiting: No     Ventilation Alteration  Cough:No    Skin Alteration   Sensation:intact     Radiation Dermatitis:  Intact []     Erythema  [x]     Discoloration  []     Rash []     Dry desquamation  []     Moist desquamation []       Emotional  Coping: effective      Injury, potential bleeding or infection: reviewed mouth rinse and skin care     No results found for: WBC, PLT      BP (!) 144/80   Pulse 82   Temp 98 °F (36.7 °C)   Resp 14   Wt 178 lb (80.7 kg)   SpO2 98%   BMI 27.06 kg/m²   Patient Currently in Pain: Yes     Pain Level: 1         Assessment/Plan: Patient was seen today for weekly visit.  Dr Linder Nicely notified and examined pt       Sofi Hughes RN

## 2021-11-29 ENCOUNTER — HOSPITAL ENCOUNTER (OUTPATIENT)
Dept: RADIATION ONCOLOGY | Age: 66
Discharge: HOME | End: 2021-11-29
Attending: RADIOLOGY
Payer: MEDICARE

## 2021-11-29 PROCEDURE — 77386 HC NTSTY MODUL RAD TX DLVR CPLX: CPT | Performed by: RADIOLOGY

## 2021-11-29 PROCEDURE — 77014 PR CT GUIDANCE PLACEMENT RAD THERAPY FIELDS: CPT | Performed by: RADIOLOGY

## 2021-11-30 ENCOUNTER — HOSPITAL ENCOUNTER (OUTPATIENT)
Dept: RADIATION ONCOLOGY | Age: 66
Discharge: HOME | End: 2021-11-30
Attending: RADIOLOGY
Payer: MEDICARE

## 2021-11-30 PROCEDURE — 77386 HC NTSTY MODUL RAD TX DLVR CPLX: CPT | Performed by: RADIOLOGY

## 2021-11-30 PROCEDURE — 77014 PR CT GUIDANCE PLACEMENT RAD THERAPY FIELDS: CPT | Performed by: RADIOLOGY

## 2021-11-30 PROCEDURE — 77427 RADIATION TX MANAGEMENT X5: CPT | Performed by: RADIOLOGY

## 2021-12-01 ENCOUNTER — HOSPITAL ENCOUNTER (OUTPATIENT)
Dept: RADIATION ONCOLOGY | Age: 66
Discharge: HOME | End: 2021-12-01
Attending: RADIOLOGY
Payer: MEDICARE

## 2021-12-01 VITALS
SYSTOLIC BLOOD PRESSURE: 156 MMHG | WEIGHT: 172.5 LBS | HEART RATE: 60 BPM | TEMPERATURE: 97.8 F | BODY MASS INDEX: 26.23 KG/M2 | DIASTOLIC BLOOD PRESSURE: 96 MMHG | OXYGEN SATURATION: 100 % | RESPIRATION RATE: 14 BRPM

## 2021-12-01 PROCEDURE — 77014 PR CT GUIDANCE PLACEMENT RAD THERAPY FIELDS: CPT | Performed by: RADIOLOGY

## 2021-12-01 PROCEDURE — 77336 RADIATION PHYSICS CONSULT: CPT | Performed by: RADIOLOGY

## 2021-12-01 PROCEDURE — 77386 HC NTSTY MODUL RAD TX DLVR CPLX: CPT | Performed by: RADIOLOGY

## 2021-12-01 ASSESSMENT — PAIN DESCRIPTION - LOCATION: LOCATION: THROAT

## 2021-12-01 ASSESSMENT — PAIN SCALES - GENERAL: PAINLEVEL_OUTOF10: 3

## 2021-12-01 NOTE — PROGRESS NOTES
Valentino Plane  12/1/2021  Wt Readings from Last 3 Encounters:   12/01/21 172 lb 8 oz (78.2 kg)   11/24/21 178 lb (80.7 kg)   11/17/21 182 lb 6.4 oz (82.7 kg)     Body mass index is 26.23 kg/m². Treatment Area:L tonsil     Patient was seen today for weekly visit. Comfort Alteration  Fatigue: Mild    Mucous Membrane Alteration  Mucositis Due to Radiation: Yes  Thrush: No  Voice Changes: No    Nutritional Alteration  Anorexia: No   Nausea: No   Vomiting: No     Ventilation Alteration  Cough:No    Skin Alteration   Sensation:intact     Radiation Dermatitis:  Intact [x]     Erythema  [x]     Discoloration  []     Rash []     Dry desquamation  []     Moist desquamation []       Emotional  Coping: effective      Injury, potential bleeding or infection: skin and oral care reviewed     No results found for: WBC, PLT      BP (!) 156/96   Pulse 60   Temp 97.8 °F (36.6 °C)   Resp 14   Wt 172 lb 8 oz (78.2 kg)   SpO2 100%   BMI 26.23 kg/m²   Patient Currently in Pain: Yes     Pain Level: 3         Assessment/Plan: Patient was seen today for weekly visit. Dr Mehdi Cobian notified and examined pt.  MMW called into pts RX        Javier Turner RN

## 2021-12-02 ENCOUNTER — HOSPITAL ENCOUNTER (OUTPATIENT)
Dept: RADIATION ONCOLOGY | Age: 66
Discharge: HOME | End: 2021-12-02
Attending: RADIOLOGY
Payer: MEDICARE

## 2021-12-02 PROCEDURE — 77014 PR CT GUIDANCE PLACEMENT RAD THERAPY FIELDS: CPT | Performed by: RADIOLOGY

## 2021-12-02 PROCEDURE — 77386 HC NTSTY MODUL RAD TX DLVR CPLX: CPT | Performed by: RADIOLOGY

## 2021-12-03 ENCOUNTER — HOSPITAL ENCOUNTER (OUTPATIENT)
Dept: RADIATION ONCOLOGY | Age: 66
Discharge: HOME | End: 2021-12-03
Attending: RADIOLOGY
Payer: MEDICARE

## 2021-12-03 PROCEDURE — 77014 PR CT GUIDANCE PLACEMENT RAD THERAPY FIELDS: CPT | Performed by: RADIOLOGY

## 2021-12-03 PROCEDURE — 77386 HC NTSTY MODUL RAD TX DLVR CPLX: CPT | Performed by: RADIOLOGY

## 2021-12-06 ENCOUNTER — HOSPITAL ENCOUNTER (OUTPATIENT)
Dept: RADIATION ONCOLOGY | Age: 66
Discharge: HOME | End: 2021-12-06
Attending: RADIOLOGY
Payer: MEDICARE

## 2021-12-06 PROCEDURE — 77014 PR CT GUIDANCE PLACEMENT RAD THERAPY FIELDS: CPT | Performed by: RADIOLOGY

## 2021-12-06 PROCEDURE — 77386 HC NTSTY MODUL RAD TX DLVR CPLX: CPT | Performed by: RADIOLOGY

## 2021-12-06 NOTE — PROGRESS NOTES
[address]       Radiation Oncology          [address] [facility], [facility] [place] [address]        Chloe Stade: [phone]        F: [phone]       [facility] [address] [facility]       Radiation Oncology   [address] [facility], [address] [facility]       Chloe Stade: [phone]       F: [phone]       [email or web address]          RADIATION ONCOLOGY WEEKLY PROGRESS NOTE  Patient ID:   Valentino Kirk  : 1955   MRN: [id number]    DIAGNOSIS:  Cancer Staging  Oropharyngeal cancer ([facility] [address]  Staging form: Pharynx - HPV-Mediated Oropharynx, AJCC 8th Edition  - Clinical stage from 10/12/2021: Stage I (cT2, cN0, cM0) - Signed by Dell Travis MD on 10/12/2021      TREATMENT DETAILS:  Treatment Site: L Tonsil and LNs  Actual Dose: 2600cGy  Total Planned Dose: 7000cGy  Treatment Technique: IMRT  Fraction Technique: Daily  Therapy imaging monitoring: CBCT daily  Concurrent Chemotherapy: NA    SUBJECTIVE:   Patient seen for their weekly on treatment evaluation today. Overall patient is doing well without any acute issues. He does report some increased soreness in the throat but is tolerating it with using Tylenol #3. He is eating well and denies any fatigue. OBJECTIVE:   CHAPERONE: Not Required    ECO Asymptomatic    VITAL SIGNS: BP (!) 144/80   Pulse 82   Temp 98 °F (36.7 °C)   Resp 14   Wt 178 lb (80.7 kg)   SpO2 98%   BMI 27.06 kg/m²   Wt Readings from Last 5 Encounters:   21 172 lb 8 oz (78.2 kg)   21 178 lb (80.7 kg)   21 182 lb 6.4 oz (82.7 kg)   11/10/21 179 lb 9.6 oz (81.5 kg)   21 180 lb 9.6 oz (81.9 kg)     GENERAL:  General appearance is that of a well-nourished, well-developed in no apparent distress. HEENT: Normocephalic, atraumatic, EOMI, moist mucosa, (+) L Tonsil erythema. NECK:  No adenopathy or a palpable thyroid mass, trachea is midline. EXTREMITIES:  No edema. No calf tenderness.   MSK:  No spinal tenderness. Normal ROM. NEUROLOGICAL: Alert and oriented. Strength and sensation intact bilaterally. No focal deficits. PSYCH: Mood normal, behavior normal.  SKIN: No erythema, no desquamation. LABS:  CREATININE   Date Value Ref Range Status   10/25/2021 0.79 0.70 - 1.20 mg/dL Final   09/22/2021 0.87 0.70 - 1.20 mg/dL Final     No results found for: , CEA  No results found for: PSA    MEDICATIONS:    Current Outpatient Medications:     hydroCHLOROthiazide (HYDRODIURIL) 12.5 MG tablet, TAKE 1 TABLET BY MOUTH  DAILY, Disp: 60 tablet, Rfl: 5    lisinopril (PRINIVIL;ZESTRIL) 20 MG tablet, TAKE 1 TABLET BY MOUTH  DAILY, Disp: 60 tablet, Rfl: 5    acetaminophen-codeine (TYLENOL #3) 300-30 MG per tablet, , Disp: , Rfl:     omeprazole (PRILOSEC) 20 MG delayed release capsule, Take 20 mg by mouth daily, Disp: , Rfl:     Multiple Vitamins-Minerals (THERAPEUTIC MULTIVITAMIN-MINERALS) tablet, Take 1 tablet by mouth daily, Disp: , Rfl:     simvastatin (ZOCOR) 20 MG tablet, Take 1 tablet by mouth nightly, Disp: 90 tablet, Rfl: 1      ASSESSMENT PLAN:   Treatment setup and plan reviewed. Port images/CBCT images reviewed. Appropriate laboratory work was reviewed. Treatment side effects and toxicities reviewed with the patient, and appropriate management was advised. Will continue radiation treatment as planned, and recommend patient contact us if they have any questions or concerns. Continue managing hydration and nutrition. Recommend patient continue using his Tylenol with codeine as well as ibuprofen as needed. Electronically signed by Gokul Shah MD on 12/6/2021 at 8:49 AM      Drugs Prescribed:  New Prescriptions    No medications on file       Other Orders Placed:  No orders of the defined types were placed in this encounter.

## 2021-12-07 ENCOUNTER — HOSPITAL ENCOUNTER (OUTPATIENT)
Dept: RADIATION ONCOLOGY | Age: 66
Discharge: HOME | End: 2021-12-07
Attending: RADIOLOGY
Payer: MEDICARE

## 2021-12-07 PROCEDURE — 77427 RADIATION TX MANAGEMENT X5: CPT | Performed by: RADIOLOGY

## 2021-12-07 PROCEDURE — 77386 HC NTSTY MODUL RAD TX DLVR CPLX: CPT | Performed by: RADIOLOGY

## 2021-12-07 PROCEDURE — 77014 PR CT GUIDANCE PLACEMENT RAD THERAPY FIELDS: CPT | Performed by: RADIOLOGY

## 2021-12-07 PROCEDURE — 77336 RADIATION PHYSICS CONSULT: CPT | Performed by: RADIOLOGY

## 2021-12-08 ENCOUNTER — HOSPITAL ENCOUNTER (OUTPATIENT)
Dept: RADIATION ONCOLOGY | Age: 66
Discharge: HOME | End: 2021-12-08
Attending: RADIOLOGY
Payer: MEDICARE

## 2021-12-08 VITALS
DIASTOLIC BLOOD PRESSURE: 79 MMHG | TEMPERATURE: 98 F | SYSTOLIC BLOOD PRESSURE: 150 MMHG | BODY MASS INDEX: 26.3 KG/M2 | OXYGEN SATURATION: 100 % | HEART RATE: 58 BPM | RESPIRATION RATE: 14 BRPM | WEIGHT: 173 LBS

## 2021-12-08 PROCEDURE — 77014 PR CT GUIDANCE PLACEMENT RAD THERAPY FIELDS: CPT | Performed by: RADIOLOGY

## 2021-12-08 PROCEDURE — 77386 HC NTSTY MODUL RAD TX DLVR CPLX: CPT | Performed by: RADIOLOGY

## 2021-12-08 ASSESSMENT — PAIN SCALES - GENERAL: PAINLEVEL_OUTOF10: 2

## 2021-12-08 ASSESSMENT — PAIN DESCRIPTION - LOCATION: LOCATION: THROAT

## 2021-12-08 NOTE — PROGRESS NOTES
Mirtha Russ  12/8/2021  Wt Readings from Last 3 Encounters:   12/08/21 173 lb (78.5 kg)   12/01/21 172 lb 8 oz (78.2 kg)   11/24/21 178 lb (80.7 kg)     Body mass index is 26.3 kg/m². Treatment Area:tonsil     Patient was seen today for weekly visit. Comfort Alteration  Fatigue: Mild    Mucous Membrane Alteration  Mucositis Due to Radiation: Yes  Thrush: No  Voice Changes: No    Nutritional Alteration  Anorexia: Yes   Nausea: No   Vomiting: No     Ventilation Alteration  Cough:No    Skin Alteration   Sensation:intact     Radiation Dermatitis:  Intact [x]     Erythema  []     Discoloration  []     Rash []     Dry desquamation  []     Moist desquamation []       Emotional  Coping: effective      Injury, potential bleeding or infection: oral and skin care reviewed     No results found for: WBC, PLT      BP (!) 150/79   Pulse 58   Temp 98 °F (36.7 °C)   Resp 14   Wt 173 lb (78.5 kg)   SpO2 100%   BMI 26.30 kg/m²   Patient Currently in Pain: Yes     Pain Level: 2         Assessment/Plan: Patient was seen today for weekly visit. Dr Esthela Kennedy notified and examined pt.       Artruo Lord RN

## 2021-12-08 NOTE — PROGRESS NOTES
[address]       Radiation Oncology          [address] [facility]          [address]        Isa Lack: [phone]        F: [phone]       [facility] [address] [facility]       Radiation Oncology   [address] [facility], [address] [facility]       Isa Lack: [phone]       F: [phone]       [email or web address]          RADIATION ONCOLOGY WEEKLY PROGRESS NOTE  Patient ID:   Norah Sender  : 1955   MRN: [id number]    DIAGNOSIS:  Cancer Staging  Oropharyngeal cancer ([facility] [address]  Staging form: Pharynx - HPV-Mediated Oropharynx, AJCC 8th Edition  - Clinical stage from 10/12/2021: Stage I (cT2, cN0, cM0) - Signed by Adrianne Hernandez MD on 10/12/2021      TREATMENT DETAILS:  Treatment Site: L Tonsil and LNs  Actual Dose: 4200cGy  Total Planned Dose: 7000cGy  Treatment Technique: IMRT  Fraction Technique: Daily  Therapy imaging monitoring: CBCT daily  Concurrent Chemotherapy: NA    SUBJECTIVE:   Patient seen for their weekly on treatment evaluation today. Overall patient is doing well without any acute issues. He has some soreness in the throat but is able to eat regular diet. He does report some increased pain but it is controlled with Aleve. He denies any fatigue. OBJECTIVE:   CHAPERONE: Not Required    ECO Asymptomatic    VITAL SIGNS: BP (!) 150/79   Pulse 58   Temp 98 °F (36.7 °C)   Resp 14   Wt 173 lb (78.5 kg)   SpO2 100%   BMI 26.30 kg/m²   Wt Readings from Last 5 Encounters:   21 173 lb (78.5 kg)   21 172 lb 8 oz (78.2 kg)   21 178 lb (80.7 kg)   21 182 lb 6.4 oz (82.7 kg)   11/10/21 179 lb 9.6 oz (81.5 kg)     GENERAL:  General appearance is that of a well-nourished, well-developed in no apparent distress. HEENT: Normocephalic, atraumatic, EOMI, moist mucosa, (+) L Tonsil erythema. NECK:  No adenopathy or a palpable thyroid mass, trachea is midline. EXTREMITIES:  No edema. No calf tenderness.   MSK: No spinal tenderness. Normal ROM. NEUROLOGICAL: Alert and oriented. Strength and sensation intact bilaterally. No focal deficits. PSYCH: Mood normal, behavior normal.  SKIN: Mild L sided erythema, no desquamation. LABS:  CREATININE   Date Value Ref Range Status   10/25/2021 0.79 0.70 - 1.20 mg/dL Final   09/22/2021 0.87 0.70 - 1.20 mg/dL Final     No results found for: , CEA  No results found for: PSA    MEDICATIONS:    Current Outpatient Medications:     hydroCHLOROthiazide (HYDRODIURIL) 12.5 MG tablet, TAKE 1 TABLET BY MOUTH  DAILY, Disp: 60 tablet, Rfl: 5    lisinopril (PRINIVIL;ZESTRIL) 20 MG tablet, TAKE 1 TABLET BY MOUTH  DAILY, Disp: 60 tablet, Rfl: 5    acetaminophen-codeine (TYLENOL #3) 300-30 MG per tablet, , Disp: , Rfl:     omeprazole (PRILOSEC) 20 MG delayed release capsule, Take 20 mg by mouth daily, Disp: , Rfl:     Multiple Vitamins-Minerals (THERAPEUTIC MULTIVITAMIN-MINERALS) tablet, Take 1 tablet by mouth daily, Disp: , Rfl:     simvastatin (ZOCOR) 20 MG tablet, Take 1 tablet by mouth nightly, Disp: 90 tablet, Rfl: 1      ASSESSMENT PLAN:   Treatment setup and plan reviewed. Port images/CBCT images reviewed. Appropriate laboratory work was reviewed. Treatment side effects and toxicities reviewed with the patient, and appropriate management was advised. Will continue radiation treatment as planned, and recommend patient contact us if they have any questions or concerns. Continue managing hydration and nutrition. Recommend patient continue using his leave and Tylenol as needed for pain. Electronically signed by Tsering Moon MD on 12/8/2021 at 8:57 AM      Drugs Prescribed:  New Prescriptions    No medications on file       Other Orders Placed:  No orders of the defined types were placed in this encounter.

## 2021-12-09 ENCOUNTER — HOSPITAL ENCOUNTER (OUTPATIENT)
Dept: SPEECH THERAPY | Age: 66
Setting detail: THERAPIES SERIES
Discharge: HOME | End: 2021-12-09
Attending: RADIOLOGY
Payer: MEDICARE

## 2021-12-09 ENCOUNTER — HOSPITAL ENCOUNTER (OUTPATIENT)
Dept: RADIATION ONCOLOGY | Age: 66
Discharge: HOME | End: 2021-12-09
Attending: RADIOLOGY
Payer: MEDICARE

## 2021-12-09 PROCEDURE — 92526 ORAL FUNCTION THERAPY: CPT

## 2021-12-09 PROCEDURE — 77014 PR CT GUIDANCE PLACEMENT RAD THERAPY FIELDS: CPT | Performed by: RADIOLOGY

## 2021-12-09 PROCEDURE — 77386 HC NTSTY MODUL RAD TX DLVR CPLX: CPT | Performed by: RADIOLOGY

## 2021-12-09 NOTE — PROGRESS NOTES
Speech Language Pathology  Pt. Problem: Undergoing radiation therapy for head and neck CA,    S: Lovely Simeon was seen by speech language pathology today after a referral from Dr. Chandra Krueger to train in range of motion and strengthening exercises of the tongue, jaw and larynx. Lovely Simeon is approximately [facility] completed w/ RT for head and neck cancer (L tonsil SCC). Pt w/ mild swallowing deficits characterized by odynophagia and dysgeusia, pain rated at ~2-3/10. Pt reports he takes tylenol and uses baking soda rinse to help with pain/sores in oral cavity. Pt currently does not have a PEG tube and indicates he wants to continue to attempt to maintain nutrition/weight with PO intake only. Pt reports dysgeusia, however primarily relates that he can't taste anything, rather than PO tasting altered. Pt reports he has to \"force himself to eat/drink, but has been doing so okay. \" Pt indicates he has maintained his weight well since beginning radiation. Pt and ST reviewed benefits of PO t/o RT related to swallowing mechanism, however ST also emphasized importance of nutrition and weight maintenance. Pt verbalized understanding.      O/A: Upon evaluation, Lovely Simeon presents with functional labial and lingual range of motion and strength. Lovely Simeon speech is 100% intelligible. Pt and ST reviewed potential swallowing deficits related to RT. Pt reports he has occasionally completed OM/EX, but has not consistently completed them as he is tolerating PO intake at this time. ST emphasized OM/EX prophylactic role in radiation related dysphagia and encouraged pt to complete OM/EX 1-2x per day. Pt verbalized understanding. Pt also encouraged to continue to eat/drink PO as able to facilitate use and integrity of swallowing mechanism.      P: It is recommended Lovely Simeon complete this program independently with the provided instructions 3-5 times daily prior to, throughout and 3-6 months following radiation treatment. Goal: Independent and preventative exercises to maximize swallow function throughout radiation therapy.     Thank you for this referral.   Richard Trejo, SLP

## 2021-12-10 ENCOUNTER — HOSPITAL ENCOUNTER (OUTPATIENT)
Dept: RADIATION ONCOLOGY | Age: 66
Discharge: HOME | End: 2021-12-10
Attending: RADIOLOGY
Payer: MEDICARE

## 2021-12-10 ENCOUNTER — TELEPHONE (OUTPATIENT)
Dept: ONCOLOGY | Age: 66
End: 2021-12-10

## 2021-12-10 ENCOUNTER — OFFICE VISIT (OUTPATIENT)
Dept: ONCOLOGY | Age: 66
End: 2021-12-10
Payer: MEDICARE

## 2021-12-10 VITALS
TEMPERATURE: 97.3 F | RESPIRATION RATE: 16 BRPM | HEART RATE: 57 BPM | BODY MASS INDEX: 27.72 KG/M2 | WEIGHT: 182.3 LBS | SYSTOLIC BLOOD PRESSURE: 143 MMHG | DIASTOLIC BLOOD PRESSURE: 77 MMHG

## 2021-12-10 DIAGNOSIS — C10.9 OROPHARYNGEAL CANCER (HCC): ICD-10-CM

## 2021-12-10 PROCEDURE — 1036F TOBACCO NON-USER: CPT | Performed by: INTERNAL MEDICINE

## 2021-12-10 PROCEDURE — 3017F COLORECTAL CA SCREEN DOC REV: CPT | Performed by: INTERNAL MEDICINE

## 2021-12-10 PROCEDURE — 77014 PR CT GUIDANCE PLACEMENT RAD THERAPY FIELDS: CPT | Performed by: RADIOLOGY

## 2021-12-10 PROCEDURE — 99214 OFFICE O/P EST MOD 30 MIN: CPT | Performed by: INTERNAL MEDICINE

## 2021-12-10 PROCEDURE — G8427 DOCREV CUR MEDS BY ELIG CLIN: HCPCS | Performed by: INTERNAL MEDICINE

## 2021-12-10 PROCEDURE — G8482 FLU IMMUNIZE ORDER/ADMIN: HCPCS | Performed by: INTERNAL MEDICINE

## 2021-12-10 PROCEDURE — 4040F PNEUMOC VAC/ADMIN/RCVD: CPT | Performed by: INTERNAL MEDICINE

## 2021-12-10 PROCEDURE — 77386 HC NTSTY MODUL RAD TX DLVR CPLX: CPT | Performed by: RADIOLOGY

## 2021-12-10 PROCEDURE — 1123F ACP DISCUSS/DSCN MKR DOCD: CPT | Performed by: INTERNAL MEDICINE

## 2021-12-10 PROCEDURE — G8417 CALC BMI ABV UP PARAM F/U: HCPCS | Performed by: INTERNAL MEDICINE

## 2021-12-10 PROCEDURE — 99211 OFF/OP EST MAY X REQ PHY/QHP: CPT | Performed by: INTERNAL MEDICINE

## 2021-12-10 NOTE — PROGRESS NOTES
Patient ID: Ayden Serna, 1955, [id number], 77 y.o. Referred by : Arlee Osler, MD  Diagnosis:   Oropharyngeal carcin shirley, left tonsil squamous cell carcinoma, p16 positive, clinical stage T2 a N0 M0, stage I disease     Cancer Staging  Oropharyngeal cancer ([facility] [address]  Staging form: Pharynx - HPV-Mediated Oropharynx, AJCC 8th Edition  - Clinical stage from 10/12/2021: Stage I (cT2, cN0, cM0) - Signed by Yasmani Crane MD on 10/12/2021    Patient has been evaluated by ENT surgical oncology at [facility] and as per the tumor board recommendation definitive radiation therapy was recommended   Patient started on definitive radiation therapy on 11/9/2021   He will completed RT on December 29   HISTORY OF PRESENT ILLNESS:    Oncologic History:  Ayden Serna is 77 y.o. male was seen during initial consultation with for newly diagnosed left tonsillar/oropharyngeal carcinoma. Patient initially presented with sore throat/throat pain in May of this year and was referred to ENT for further evaluation. His ENT evaluation did show 3 cm left tonsillar mass limited to tonsils only. Patient had a CT of the neck on 9/22/2021 which showed 3.2 cm mass in the left palatine tonsil without any abnormal lymphadenopathy. Patient was evaluated by ENT and had a biopsy of the left tonsil which showed poorly differentiated invasive squamous cell carcinoma, p16 positive. Subsequently patient had a PET scan on 10/6/2021 which showed no distant metastasis and no lymph node metastasis noted. Patient has been evaluated by radiation oncology today. He denies any unintentional weight loss, drenching night sweats fever chills. INTERVAL HISTORY  Patient is returning for follow-up visit and to discuss lab results and further recommendations. Is tolerating radiation therapy well. He denies any fever chills, chest pain or shortness of breath. He denies any abdominal pain, nausea vomiting.       During this visit patient's allergy, social, medical, surgical history and medications were reviewed and updated. Past Medical History:   Diagnosis Date    Hyperlipidemia     Hypertension        Past Surgical History:   Procedure Laterality Date    ACHILLES TENDON SURGERY      about 30 years ago        Allergies   Allergen Reactions    Seasonal      Runny nose scratchy throat       Current Outpatient Medications   Medication Sig Dispense Refill    hydroCHLOROthiazide (HYDRODIURIL) 12.5 MG tablet TAKE 1 TABLET BY MOUTH  DAILY 60 tablet 5    lisinopril (PRINIVIL;ZESTRIL) 20 MG tablet TAKE 1 TABLET BY MOUTH  DAILY 60 tablet 5    omeprazole (PRILOSEC) 20 MG delayed release capsule Take 20 mg by mouth daily      Multiple Vitamins-Minerals (THERAPEUTIC MULTIVITAMIN-MINERALS) tablet Take 1 tablet by mouth daily      simvastatin (ZOCOR) 20 MG tablet Take 1 tablet by mouth nightly 90 tablet 1    acetaminophen-codeine (TYLENOL #3) 300-30 MG per tablet  (Patient not taking: Reported on 12/10/2021)       No current facility-administered medications for this visit.        Social History     Socioeconomic History    Marital status:      Spouse name: Not on file    Number of children: Not on file    Years of education: Not on file    Highest education level: Not on file   Occupational History    Not on file   Tobacco Use    Smoking status: Former Smoker     Types: Cigarettes    Smokeless tobacco: Never Used   Vaping Use    Vaping Use: Never used   Substance and Sexual Activity    Alcohol use: Yes     Comment: socially     Drug use: Not Currently    Sexual activity: Yes     Partners: Female   Other Topics Concern    Not on file   Social History Narrative    Not on file     Social Determinants of Health     Financial Resource Strain: Low Risk     Difficulty of Paying Living Expenses: Not hard at all   Food Insecurity: No Food Insecurity    Worried About [address] [employer] in the Last Year: Never true    [address] [facility] [address] in the Last Year: Never true   Transportation Needs: No Transportation Needs    Lack of Transportation (Medical): No    Lack of Transportation (Non-Medical): No   Physical Activity:     Days of Exercise per Week: Not on file    Minutes of Exercise per Session: Not on file   Stress: No Stress Concern Present    Feeling of Stress : Not at all   Social Connections:     Frequency of Communication with Friends and Family: Not on file    Frequency of Social Gatherings with Friends and Family: Not on file    Attends Mosque Services: Not on file    Active Member of [address] [place] [address] [employer] or Organizations: Not on file    Attends Club or Organization Meetings: Not on file    Marital Status: Not on file   Intimate Partner Violence:     Fear of Current or Ex-Partner: Not on file    Emotionally Abused: Not on file    Physically Abused: Not on file    Sexually Abused: Not on file   Housing Stability:     Unable to Pay for Housing in the Last Year: Not on file    Number of [place] in the Last Year: Not on file    Unstable Housing in the Last Year: Not on file       Family History   Problem Relation Age of Onset    Coronary Art Dis Mother     Cancer Father         REVIEW OF SYSTEM:     Constitutional: No fever or chills. No night sweats, no weight loss   Eyes: No eye discharge, double vision, or eye pain   HEENT: negative for sore mouth, sore throat, hoarseness and voice change   Respiratory: negative for cough , sputum, dyspnea, wheezing, hemoptysis, chest pain   Cardiovascular: negative for chest pain, dyspnea, palpitations, orthopnea, PND   Gastrointestinal: negative for nausea, vomiting, diarrhea, constipation, abdominal pain, Dysphagia, hematemesis and hematochezia   Genitourinary: negative for frequency, dysuria, nocturia, urinary incontinence, and hematuria   Integument: negative for rash, skin lesions, bruises.    Hematologic/Lymphatic: negative for easy bruising, bleeding, lymphadenopathy, petechiae and swelling/edema   Endocrine: negative for heat or cold intolerance, tremor, weight changes, change in bowel habits and hair loss   Musculoskeletal: negative for myalgias, arthralgias, pain, joint swelling,and bone pain   Neurological: negative for headaches, dizziness, seizures, weakness, numbness       OBJECTIVE:         Vitals:    12/10/21 1056   BP: (!) 143/77   Pulse: 57   Resp:    Temp:        PHYSICAL EXAM:   General appearance - well appearing, no in pain or distress   Mental status - alert and cooperative   Eyes - pupils equal and reactive, extraocular eye movements intact   Ears - bilateral TM's and external ear canals normal   Mouth - mucous membranes moist, pharynx normal without lesions   Neck - supple, no significant adenopathy   Lymphatics - no palpable lymphadenopathy, no hepatosplenomegaly   Chest - clear to auscultation, no wheezes, rales or rhonchi, symmetric air entry   Heart - normal rate, regular rhythm, normal S1, S2, no murmurs, rubs, clicks or gallops   Abdomen - soft, nontender, nondistended, no masses or organomegaly   Neurological - alert, oriented, normal speech, no focal findings or movement disorder noted   Musculoskeletal - no joint tenderness, deformity or swelling   Extremities - peripheral pulses normal, no pedal edema, no clubbing or cyanosis   Skin - normal coloration and turgor, no rashes, no suspicious skin lesions noted ,      LABORATORY DATA:   No results found for: WBC, HGB, HCT, MCV, PLT, LABLYMP, MID, GRAN, LYMPHOPCT, MIDPERCENT, GRANULOCYTES, RBC, MCH, MCHC, RDW, NEUTOPHILPCT, MONOPCT, EOSPCT, BASOPCT, NEUTROABS, LYMPHSABS, MONOSABS, EOSABS, BASOSABS      Chemistry        Component Value Date/Time    BUN 10 10/25/2021 1000    CREATININE 0.79 10/25/2021 1000    No results found for: CALCIUM, ALKPHOS, AST, ALT, BILITOT     PATHOLOGY DATA:   10/1/2021  8:43 AM - August Pierson Incoming Lab Results From "[employer]    Component Collected Lab   Surgical Pathology Report 09/27/2021 10:13 [facility]   -- Diagnosis --   LEFT TONSIL, BIOPSIES:             -  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 IMAGING DATA:    PET CT SKULL BASE TO MID THIGH  Narrative: EXAMINATION:  WHOLE BODY PET/CT  10/6/2021    TECHNIQUE:  Following IV injection of 11.4 mCi of F 18 -FDG, PET  tumor imaging was  acquired from the base of the skull to the mid thighs. Computed tomography  was used for purposes of attenuation correction and anatomic localization. Fusion imaging was utilized for interpretation. Uptake time 59 mins. Glucose level 105 mg/dl. COMPARISON:  CT neck 09/22/2021    HISTORY:  Reason for Exam: Malignant neoplasm of tonsillar fossa  Acuity: Acute  Type of Exam: Initial    FINDINGS:  HEAD/NECK: Redemonstration of left palatine tonsillar lesion which is FDG  avid with max SUV of 12.0. No metabolically active cervical lymphadenopathy. 8 mm posterior right thyroid hypodense nodule which is FDG avid with max SUV  of 6.0. CHEST: Left retrocardiac lower lobe nodular area of opacification measuring 2  cm which demonstrates faint uptake with max SUV of 2.3. No metabolically  active axillary, hilar, or mediastinal lymphadenopathy. ABDOMEN/PELVIS: No metabolically active intraperitoneal mass. No  metabolically active abdominal or pelvic lymphadenopathy. Physiologic  activity in the gastrointestinal and genitourinary systems. BONES/SOFT TISSUE: No abnormal FDG activity localizes to the bones. No  aggressive osseous lesion. INCIDENTAL CT FINDINGS: Multivessel coronary artery calcification. Aortic  atherosclerosis most pronounced in the infrarenal segment. Sigmoid  diverticulosis. Impression: 1. Redemonstration of left palatine tonsillar lesion which is FDG avid  consistent with history of malignancy.   2. Posterior right thyroid hypodense nodule which is FDG avid, recommend  further evaluation with dedicated ultrasound. 3. Left retrocardiac lower lobe nodular area of opacification as measured  above which demonstrates faint uptake and is favored to represent possible  pneumonia or other infectious/inflammatory process though  metastatic/neoplastic etiology not definitively excluded. Recommend post  treatment chest CT in 6-8 weeks to reassess. If persistent at time of  follow-up then further evaluation can be obtained with tissue sampling. ASSESSMENT:    Nell Edmond is a 77 y.o. pleasant male who was seen during initial consultation visit for newly diagnosed oropharyngeal cancer, p16 positive, clinical stage T2 a N0 M0, stage I disease. I reviewed the NCCN guidelines and goals of care with patient. I discussed the care plan with radiation oncologist Dr. Marck Bahena. I explained that patient has early stage disease and as per the NCCN guidelines surgery would be recommended. He was evaluated by ENT at [facility] and recommended definitive radiation therapy. No indication for chemotherapy at this point. Thyroid nodule    During today's visit, the patient and the family had a number of reasonable questions which were answered to their satisfaction. They verbalized understanding of the information provided and they agreed to proceed as outlined above. PLAN:   I reviewed the recent laboratory data, discussed the diagnosis and treatment recommendations with patient   Continue radiation therapy as planned   Return to clinic in 6 weeks or earlier if needed       Charles Alvarez MD  Hematologist/Medical Oncologist      This note is created with the assistance of a speech recognition program.  While intending to generate a document that actually reflects the content of the visit, the document can still have some errors including those of syntax and sound a like substitutions which may escape proof reading.   It such instances, actual meaning can be extrapolated by contextual diversion.     Barb Cid MD

## 2021-12-10 NOTE — TELEPHONE ENCOUNTER
AVS from 12/10/21     RTC in 6 weeks    rv scheduled for 1/25/22 @ 8am    Pt was given AVS and appt schedule

## 2021-12-10 NOTE — TELEPHONE ENCOUNTER
Name: Tiera Delcid  : 1955  MRN: [id number]    Oncology Navigation Follow-Up Note    Contact Type:  Medical Oncology  Notes: Pt @ [facility] for Dr. Amelia Richards f/u & XRT. Met with pt prior to f/u. Pt denied questions/concerns. Instructed pt may contact writer prn. Will continue to follow.     Electronically signed by Jazmine Kaufman RN on 12/10/2021 at 11:09 AM

## 2021-12-13 ENCOUNTER — APPOINTMENT (OUTPATIENT)
Dept: RADIATION ONCOLOGY | Age: 66
End: 2021-12-13
Attending: RADIOLOGY
Payer: MEDICARE

## 2021-12-13 NOTE — PROGRESS NOTES
[address] LTAC, located within [facility], [address] [place] [address]  Fax   [place] [phone]             RADIATION ONCOLOGY WEEKLY PROGRESS NOTE  Patient ID:   Desean Romero  : 1955   MRN: [id number]    DIAGNOSIS:  Cancer Staging  Oropharyngeal cancer ([facility] [address]  Staging form: Pharynx - HPV-Mediated Oropharynx, AJCC 8th Edition  - Clinical stage from 10/12/2021: Stage I (cT2, cN0, cM0) - Signed by Heaven Reyes MD on 10/12/2021      TREATMENT DETAILS:  Treatment Site: head and neck   Actual Dose: 3200cGy  Total Planned Dose: 7000cGy  Treatment Technique: IMRT  Fraction Technique: Daily  Therapy imaging monitoring: CBCT daily  Concurrent Chemotherapy: yes    SUBJECTIVE:   Patient seen for their weekly on treatment evaluation today. Doing okay, pain but controlled with current medications. No PEG tube and would like to avoid if possible. OBJECTIVE:   CHAPERONE: Not Required    ECO Asymptomatic    VITAL SIGNS: BP (!) 156/96   Pulse 60   Temp 97.8 °F (36.6 °C)   Resp 14   Wt 172 lb 8 oz (78.2 kg)   SpO2 100%   BMI 26.23 kg/m²   Wt Readings from Last 5 Encounters:   12/10/21 182 lb 4.8 oz (82.7 kg)   21 173 lb (78.5 kg)   21 172 lb 8 oz (78.2 kg)   21 178 lb (80.7 kg)   21 182 lb 6.4 oz (82.7 kg)     GENERAL:  General appearance is that of a well-nourished, well-developed in no apparent distress. HEENT: Normocephalic, atraumatic, EOMI, moist mucosa, no erythema. Indirect exam .  NECK:  No adenopathy or a palpable thyroid mass, trachea is midline. LYMPHATICS: No cervical, supraclavicular, or axillary adenopathy. HEART:  Normal rate and regular rhythm, normal heart sounds. LUNGS:  Pulmonary effort normal. Normal breath sounds. ABDOMEN:  Soft, nontender, non distended, and no hepatosplenomegaly. EXTREMITIES:  No edema. No calf tenderness. MSK:  No spinal tenderness. Normal ROM. NEUROLOGICAL: Alert and oriented. Strength and sensation intact bilaterally.  No focal deficits. PSYCH: Mood normal, behavior normal.  SKIN: No erythema, no desquamation. RECTAL: Deferred       LABS:  CREATININE   Date Value Ref Range Status   10/25/2021 0.79 0.70 - 1.20 mg/dL Final   09/22/2021 0.87 0.70 - 1.20 mg/dL Final     No results found for: , CEA  No results found for: PSA    MEDICATIONS:    Current Outpatient Medications:     hydroCHLOROthiazide (HYDRODIURIL) 12.5 MG tablet, TAKE 1 TABLET BY MOUTH  DAILY, Disp: 60 tablet, Rfl: 5    lisinopril (PRINIVIL;ZESTRIL) 20 MG tablet, TAKE 1 TABLET BY MOUTH  DAILY, Disp: 60 tablet, Rfl: 5    acetaminophen-codeine (TYLENOL #3) 300-30 MG per tablet, , Disp: , Rfl:     omeprazole (PRILOSEC) 20 MG delayed release capsule, Take 20 mg by mouth daily, Disp: , Rfl:     Multiple Vitamins-Minerals (THERAPEUTIC MULTIVITAMIN-MINERALS) tablet, Take 1 tablet by mouth daily, Disp: , Rfl:     simvastatin (ZOCOR) 20 MG tablet, Take 1 tablet by mouth nightly, Disp: 90 tablet, Rfl: 1      ASSESSMENT PLAN:     Doing okay. Discussed possibility of needing pain medicine towards end of therapy (patient trying to avoid pain medicine and PEG tube). Discussed importance of nutrition and hydration. Continue xrt. Treatment setup and plan reviewed. Port images/CBCT images reviewed. Appropriate laboratory work was reviewed. Treatment side effects and toxicities reviewed with the patient, and appropriate management was advised. Will continue radiation treatment as planned, and recommend patient contact us if they have any questions or concerns. Electronically signed by Angle Goldstein MD on 12/13/2021 at 12:30 PM      Drugs Prescribed:  New Prescriptions    No medications on file       Other Orders Placed:  No orders of the defined types were placed in this encounter.

## 2021-12-14 ENCOUNTER — HOSPITAL ENCOUNTER (OUTPATIENT)
Dept: RADIATION ONCOLOGY | Age: 66
Discharge: HOME | End: 2021-12-14
Attending: RADIOLOGY
Payer: MEDICARE

## 2021-12-14 PROCEDURE — 77014 PR CT GUIDANCE PLACEMENT RAD THERAPY FIELDS: CPT | Performed by: RADIOLOGY

## 2021-12-14 PROCEDURE — 77386 HC NTSTY MODUL RAD TX DLVR CPLX: CPT | Performed by: RADIOLOGY

## 2021-12-15 ENCOUNTER — HOSPITAL ENCOUNTER (OUTPATIENT)
Dept: RADIATION ONCOLOGY | Age: 66
Discharge: HOME | End: 2021-12-15
Attending: RADIOLOGY
Payer: MEDICARE

## 2021-12-15 VITALS
BODY MASS INDEX: 27.37 KG/M2 | RESPIRATION RATE: 18 BRPM | TEMPERATURE: 97.8 F | WEIGHT: 180 LBS | SYSTOLIC BLOOD PRESSURE: 143 MMHG | DIASTOLIC BLOOD PRESSURE: 80 MMHG | OXYGEN SATURATION: 99 % | HEART RATE: 64 BPM

## 2021-12-15 PROCEDURE — 77386 HC NTSTY MODUL RAD TX DLVR CPLX: CPT | Performed by: RADIOLOGY

## 2021-12-15 PROCEDURE — 77336 RADIATION PHYSICS CONSULT: CPT | Performed by: RADIOLOGY

## 2021-12-15 PROCEDURE — 77427 RADIATION TX MANAGEMENT X5: CPT | Performed by: RADIOLOGY

## 2021-12-15 PROCEDURE — 77014 PR CT GUIDANCE PLACEMENT RAD THERAPY FIELDS: CPT | Performed by: RADIOLOGY

## 2021-12-15 ASSESSMENT — PAIN DESCRIPTION - LOCATION: LOCATION: THROAT

## 2021-12-15 ASSESSMENT — PAIN SCALES - GENERAL: PAINLEVEL_OUTOF10: 3

## 2021-12-15 NOTE — PROGRESS NOTES
[address]       Radiation Oncology          [address] [facility]          [place] [place]        Tay Peek: [phone]        F: [phone]       [facility] [address] [facility]       Radiation Oncology   [address] [facility], [address] [facility]       Tay Peek: [phone]       F: [phone]       [employer]          RADIATION ONCOLOGY WEEKLY PROGRESS NOTE  Patient ID:   Jing Griggs  : 1955   MRN: [id number]    DIAGNOSIS:  Cancer Staging  Oropharyngeal cancer ([facility] [address]  Staging form: Pharynx - HPV-Mediated Oropharynx, AJCC 8th Edition  - Clinical stage from 10/12/2021: Stage I (cT2, cN0, cM0) - Signed by Xander Fox MD on 10/12/2021      TREATMENT DETAILS:  Treatment Site: L Tonsil and LNs  Actual Dose: 5000cGy  Total Planned Dose: 7000cGy  Treatment Technique: IMRT  Fraction Technique: Daily  Therapy imaging monitoring: CBCT daily  Concurrent Chemotherapy: NA    SUBJECTIVE:   Patient seen for their weekly on treatment evaluation today. Overall patient is doing well without any acute issues. He has some soreness and blisters in the throat but is able to eat regular diet still. He does report some increased pain but it is controlled with Aleve. He does have fatigue but is maintaining his weight. OBJECTIVE:   CHAPERONE: Not Required    ECO Asymptomatic    VITAL SIGNS: BP (!) 143/80   Pulse 64   Temp 97.8 °F (36.6 °C)   Resp 18   Wt 180 lb (81.6 kg)   SpO2 99%   BMI 27.37 kg/m²   Wt Readings from Last 5 Encounters:   12/15/21 180 lb (81.6 kg)   12/10/21 182 lb 4.8 oz (82.7 kg)   21 173 lb (78.5 kg)   21 172 lb 8 oz (78.2 kg)   21 178 lb (80.7 kg)     GENERAL:  General appearance is that of a well-nourished, well-developed in no apparent distress. HEENT: Normocephalic, atraumatic, EOMI, moist mucosa, (+) L Tonsil erythema, mucositis.    NECK:  No adenopathy or a palpable thyroid mass, trachea is midline. EXTREMITIES:  No edema. No calf tenderness. MSK:  No spinal tenderness. Normal ROM. NEUROLOGICAL: Alert and oriented. Strength and sensation intact bilaterally. No focal deficits. PSYCH: Mood normal, behavior normal.  SKIN: Mild L sided erythema, no desquamation. LABS:  CREATININE   Date Value Ref Range Status   10/25/2021 0.79 0.70 - 1.20 mg/dL Final   09/22/2021 0.87 0.70 - 1.20 mg/dL Final     No results found for: , CEA  No results found for: PSA    MEDICATIONS:    Current Outpatient Medications:     hydroCHLOROthiazide (HYDRODIURIL) 12.5 MG tablet, TAKE 1 TABLET BY MOUTH  DAILY, Disp: 60 tablet, Rfl: 5    lisinopril (PRINIVIL;ZESTRIL) 20 MG tablet, TAKE 1 TABLET BY MOUTH  DAILY, Disp: 60 tablet, Rfl: 5    acetaminophen-codeine (TYLENOL #3) 300-30 MG per tablet, , Disp: , Rfl:     omeprazole (PRILOSEC) 20 MG delayed release capsule, Take 20 mg by mouth daily, Disp: , Rfl:     Multiple Vitamins-Minerals (THERAPEUTIC MULTIVITAMIN-MINERALS) tablet, Take 1 tablet by mouth daily, Disp: , Rfl:     simvastatin (ZOCOR) 20 MG tablet, Take 1 tablet by mouth nightly, Disp: 90 tablet, Rfl: 1      ASSESSMENT PLAN:   Treatment setup and plan reviewed. Port images/CBCT images reviewed. Appropriate laboratory work was reviewed. Treatment side effects and toxicities reviewed with the patient, and appropriate management was advised. Will continue radiation treatment as planned, and recommend patient contact us if they have any questions or concerns. Continue managing hydration and nutrition. Recommend patient continue using his Aleve and Tylenol as needed for pain. Electronically signed by Narendra Hamlin MD on 12/15/2021 at 9:09 AM      Drugs Prescribed:  New Prescriptions    No medications on file       Other Orders Placed:  No orders of the defined types were placed in this encounter.

## 2021-12-15 NOTE — PROGRESS NOTES
Allison Burns  12/15/2021  Wt Readings from Last 3 Encounters:   12/15/21 180 lb (81.6 kg)   12/10/21 182 lb 4.8 oz (82.7 kg)   12/08/21 173 lb (78.5 kg)     Body mass index is 27.37 kg/m². Treatment Area:L tonsil     Patient was seen today for weekly visit. Comfort Alteration  Fatigue: Mild    Mucous Membrane Alteration  Mucositis Due to Radiation: Yes  Thrush: No  Voice Changes: No    Nutritional Alteration  Anorexia: No   Nausea: No   Vomiting: No     Ventilation Alteration  Cough:No    Skin Alteration   Sensation:intact, slight burning     Radiation Dermatitis:  Intact []     Erythema  [x]     Discoloration  []     Rash []     Dry desquamation  []     Moist desquamation []       Emotional  Coping: effective      Injury, potential bleeding or infection: skin care reviewed     No results found for: WBC, PLT      BP (!) 143/80   Pulse 64   Temp 97.8 °F (36.6 °C)   Resp 18   Wt 180 lb (81.6 kg)   SpO2 99%   BMI 27.37 kg/m²   Patient Currently in Pain: Yes     Pain Level: 3         Assessment/Plan: Patient was seen today for weekly visit.  Dr Ryanne Thomas notified and examined pt       Enriqueta Aviles RN

## 2021-12-16 ENCOUNTER — HOSPITAL ENCOUNTER (OUTPATIENT)
Dept: RADIATION ONCOLOGY | Age: 66
Discharge: HOME | End: 2021-12-16
Attending: RADIOLOGY
Payer: MEDICARE

## 2021-12-16 PROCEDURE — 77014 PR CT GUIDANCE PLACEMENT RAD THERAPY FIELDS: CPT | Performed by: RADIOLOGY

## 2021-12-16 PROCEDURE — 77386 HC NTSTY MODUL RAD TX DLVR CPLX: CPT | Performed by: RADIOLOGY

## 2021-12-17 ENCOUNTER — HOSPITAL ENCOUNTER (OUTPATIENT)
Dept: RADIATION ONCOLOGY | Age: 66
Discharge: HOME | End: 2021-12-17
Attending: RADIOLOGY
Payer: MEDICARE

## 2021-12-17 PROCEDURE — 77014 PR CT GUIDANCE PLACEMENT RAD THERAPY FIELDS: CPT | Performed by: RADIOLOGY

## 2021-12-17 PROCEDURE — 77386 HC NTSTY MODUL RAD TX DLVR CPLX: CPT | Performed by: RADIOLOGY

## 2021-12-20 ENCOUNTER — HOSPITAL ENCOUNTER (OUTPATIENT)
Dept: RADIATION ONCOLOGY | Age: 66
Discharge: HOME | End: 2021-12-20
Attending: RADIOLOGY
Payer: MEDICARE

## 2021-12-20 PROCEDURE — 77014 PR CT GUIDANCE PLACEMENT RAD THERAPY FIELDS: CPT | Performed by: RADIOLOGY

## 2021-12-20 PROCEDURE — 77386 HC NTSTY MODUL RAD TX DLVR CPLX: CPT | Performed by: RADIOLOGY

## 2021-12-21 ENCOUNTER — HOSPITAL ENCOUNTER (OUTPATIENT)
Dept: RADIATION ONCOLOGY | Age: 66
Discharge: HOME | End: 2021-12-21
Attending: RADIOLOGY
Payer: MEDICARE

## 2021-12-21 PROCEDURE — 77386 HC NTSTY MODUL RAD TX DLVR CPLX: CPT | Performed by: RADIOLOGY

## 2021-12-21 PROCEDURE — 77014 PR CT GUIDANCE PLACEMENT RAD THERAPY FIELDS: CPT | Performed by: RADIOLOGY

## 2021-12-22 ENCOUNTER — HOSPITAL ENCOUNTER (OUTPATIENT)
Dept: RADIATION ONCOLOGY | Age: 66
Discharge: HOME | End: 2021-12-22
Attending: RADIOLOGY
Payer: MEDICARE

## 2021-12-22 VITALS
SYSTOLIC BLOOD PRESSURE: 127 MMHG | OXYGEN SATURATION: 100 % | WEIGHT: 179.2 LBS | BODY MASS INDEX: 27.25 KG/M2 | RESPIRATION RATE: 16 BRPM | DIASTOLIC BLOOD PRESSURE: 72 MMHG | TEMPERATURE: 97.2 F | HEART RATE: 60 BPM

## 2021-12-22 PROCEDURE — 77386 HC NTSTY MODUL RAD TX DLVR CPLX: CPT | Performed by: RADIOLOGY

## 2021-12-22 PROCEDURE — 77014 PR CT GUIDANCE PLACEMENT RAD THERAPY FIELDS: CPT | Performed by: RADIOLOGY

## 2021-12-22 PROCEDURE — 77427 RADIATION TX MANAGEMENT X5: CPT | Performed by: RADIOLOGY

## 2021-12-22 ASSESSMENT — PAIN SCALES - GENERAL: PAINLEVEL_OUTOF10: 6

## 2021-12-22 ASSESSMENT — PAIN DESCRIPTION - LOCATION: LOCATION: THROAT

## 2021-12-23 ENCOUNTER — HOSPITAL ENCOUNTER (OUTPATIENT)
Dept: RADIATION ONCOLOGY | Age: 66
Discharge: HOME | End: 2021-12-23
Attending: RADIOLOGY
Payer: MEDICARE

## 2021-12-23 PROCEDURE — 77014 PR CT GUIDANCE PLACEMENT RAD THERAPY FIELDS: CPT | Performed by: RADIOLOGY

## 2021-12-23 PROCEDURE — 77386 HC NTSTY MODUL RAD TX DLVR CPLX: CPT | Performed by: RADIOLOGY

## 2021-12-26 NOTE — PROGRESS NOTES
[address]       Radiation Oncology          [address] [facility], [address]        [place]: [phone]        F: [phone]       [facility]. [address] [facility]       Radiation Oncology   [address] [facility], [address] [facility]       [place]: [phone]       F: [phone]       [employer]          RADIATION ONCOLOGY WEEKLY PROGRESS NOTE  Patient ID:   Thomas Elizabeth  : 1955   MRN: [id number]    Location:  [facility] Radiation Oncology,   [facility]   [phone]     DIAGNOSIS:  Cancer Staging  Oropharyngeal cancer ([facility] [address]  Staging form: Pharynx - HPV-Mediated Oropharynx, AJCC 8th Edition  - Clinical stage from 10/12/2021: Stage I (cT2, cN0, cM0) - Signed by Erica Hernández MD on 10/12/2021      TREATMENT DETAILS:  Treatment Site: L tonsil  Actual Dose: 6000cGy  Total Planned Dose: 7000cGy  Treatment Technique: IMRT  Fraction Technique: Daily  Therapy imaging monitoring: CBCT daily  Concurrent Chemotherapy: NA    SUBJECTIVE:   Patient seen for their weekly on treatment evaluation today. Overall patient is doing well without any acute issues. He has some soreness in the throat but is able to eat regular diet still, though he cannot taste anything. He does report some increased pain but it is still controlled with Aleve. He does have fatigue but is maintaining his weight.     OBJECTIVE:   CHAPERONE: Not Required    ECO Symptomatic but completely ambulatory    VITAL SIGNS: /72   Pulse 60   Temp 97.2 °F (36.2 °C)   Resp 16   Wt 179 lb 3.2 oz (81.3 kg)   SpO2 100%   BMI 27.25 kg/m²   Wt Readings from Last 5 Encounters:   21 179 lb 3.2 oz (81.3 kg)   12/15/21 180 lb (81.6 kg)   12/10/21 182 lb 4.8 oz (82.7 kg)   21 173 lb (78.5 kg)   21 172 lb 8 oz (78.2 kg)     GENERAL:  General appearance is that of a well-nourished, well-developed in no apparent distress. HEENT: Normocephalic, atraumatic, EOMI, moist mucosa, (+) erythema. NEUROLOGICAL: Alert and oriented. Strength and sensation intact bilaterally. No focal deficits. PSYCH: Mood normal, behavior normal.  SKIN: Mild erythema, no desquamation. LABS:  CREATININE   Date Value Ref Range Status   10/25/2021 0.79 0.70 - 1.20 mg/dL Final   09/22/2021 0.87 0.70 - 1.20 mg/dL Final     No results found for: , CEA  No results found for: PSA    MEDICATIONS:    Current Outpatient Medications:     hydroCHLOROthiazide (HYDRODIURIL) 12.5 MG tablet, TAKE 1 TABLET BY MOUTH  DAILY, Disp: 60 tablet, Rfl: 5    lisinopril (PRINIVIL;ZESTRIL) 20 MG tablet, TAKE 1 TABLET BY MOUTH  DAILY, Disp: 60 tablet, Rfl: 5    acetaminophen-codeine (TYLENOL #3) 300-30 MG per tablet, , Disp: , Rfl:     omeprazole (PRILOSEC) 20 MG delayed release capsule, Take 20 mg by mouth daily, Disp: , Rfl:     Multiple Vitamins-Minerals (THERAPEUTIC MULTIVITAMIN-MINERALS) tablet, Take 1 tablet by mouth daily, Disp: , Rfl:     simvastatin (ZOCOR) 20 MG tablet, Take 1 tablet by mouth nightly, Disp: 90 tablet, Rfl: 1      ASSESSMENT PLAN:     Treatment setup and plan reviewed. Port images/CBCT images reviewed. Appropriate laboratory work was reviewed. Treatment side effects and toxicities reviewed with the patient, and appropriate management was advised. Will continue radiation treatment as planned, and recommend patient contact us if they have any questions or concerns. Continue managing hydration and nutrition. Recommend patient continue using his Aleve and Tylenol as needed for pain. Electronically signed by Raimundo Allen MD on 12/26/2021 at 2:01 AM      Drugs Prescribed:  New Prescriptions    No medications on file       Other Orders Placed:  No orders of the defined types were placed in this encounter.

## 2021-12-27 ENCOUNTER — HOSPITAL ENCOUNTER (OUTPATIENT)
Dept: RADIATION ONCOLOGY | Age: 66
Discharge: HOME | End: 2021-12-27
Attending: RADIOLOGY
Payer: MEDICARE

## 2021-12-27 PROCEDURE — 77014 PR CT GUIDANCE PLACEMENT RAD THERAPY FIELDS: CPT | Performed by: RADIOLOGY

## 2021-12-27 PROCEDURE — 77386 HC NTSTY MODUL RAD TX DLVR CPLX: CPT | Performed by: RADIOLOGY

## 2021-12-28 ENCOUNTER — HOSPITAL ENCOUNTER (OUTPATIENT)
Dept: RADIATION ONCOLOGY | Age: 66
Discharge: HOME | End: 2021-12-28
Attending: RADIOLOGY
Payer: MEDICARE

## 2021-12-28 PROCEDURE — 77386 HC NTSTY MODUL RAD TX DLVR CPLX: CPT | Performed by: RADIOLOGY

## 2021-12-28 PROCEDURE — 77014 PR CT GUIDANCE PLACEMENT RAD THERAPY FIELDS: CPT | Performed by: RADIOLOGY

## 2021-12-29 ENCOUNTER — HOSPITAL ENCOUNTER (OUTPATIENT)
Dept: RADIATION ONCOLOGY | Age: 66
Discharge: HOME | End: 2021-12-29
Attending: RADIOLOGY
Payer: MEDICARE

## 2021-12-29 ENCOUNTER — HOSPITAL ENCOUNTER (OUTPATIENT)
Dept: SPEECH THERAPY | Age: 66
Setting detail: THERAPIES SERIES
Discharge: HOME | End: 2021-12-29
Attending: RADIOLOGY
Payer: MEDICARE

## 2021-12-29 VITALS
WEIGHT: 180 LBS | SYSTOLIC BLOOD PRESSURE: 120 MMHG | OXYGEN SATURATION: 99 % | TEMPERATURE: 98 F | HEART RATE: 58 BPM | BODY MASS INDEX: 27.37 KG/M2 | DIASTOLIC BLOOD PRESSURE: 77 MMHG | RESPIRATION RATE: 14 BRPM

## 2021-12-29 PROCEDURE — 92526 ORAL FUNCTION THERAPY: CPT

## 2021-12-29 PROCEDURE — 77014 PR CT GUIDANCE PLACEMENT RAD THERAPY FIELDS: CPT | Performed by: RADIOLOGY

## 2021-12-29 PROCEDURE — 77386 HC NTSTY MODUL RAD TX DLVR CPLX: CPT | Performed by: RADIOLOGY

## 2021-12-29 ASSESSMENT — PAIN SCALES - GENERAL: PAINLEVEL_OUTOF10: 5

## 2021-12-29 ASSESSMENT — PAIN DESCRIPTION - LOCATION: LOCATION: THROAT

## 2021-12-29 NOTE — PROGRESS NOTES
Valentino Kirk  12/29/2021  Wt Readings from Last 3 Encounters:   12/29/21 180 lb (81.6 kg)   12/22/21 179 lb 3.2 oz (81.3 kg)   12/15/21 180 lb (81.6 kg)     Body mass index is 27.37 kg/m². Treatment Area: tonsil     Patient was seen today for weekly visit. Comfort Alteration  Fatigue: None    Mucous Membrane Alteration  Mucositis Due to Radiation: No  Thrush: No  Voice Changes: No    Nutritional Alteration  Anorexia: Yes   Nausea: No   Vomiting: No     Ventilation Alteration  Cough:No    Skin Alteration   Sensation:intact     Radiation Dermatitis:  Intact [x]     Erythema  []     Discoloration  []     Rash []     Dry desquamation  []     Moist desquamation []       Emotional  Coping: effective      Injury, potential bleeding or infection: oral care reviewed     No results found for: WBC, PLT      /77   Pulse 58   Temp 98 °F (36.7 °C)   Resp 14   Wt 180 lb (81.6 kg)   SpO2 99%   BMI 27.37 kg/m²   Patient Currently in Pain: Yes     Pain Level: 5         Assessment/Plan: Patient was seen today for weekly visit. Dr Sheryl Lew notified and examined pt.      Javier Turner RN

## 2021-12-29 NOTE — PROGRESS NOTES
[address]       Radiation Oncology          [address]          [place] [address]        Steve [place]: [phone]        F: [phone]       [employer] [address] [facility]       Radiation Oncology   [address] [facility], [address] [facility]       [place]: [phone]       F: [phone]       [email or web address]          RADIATION ONCOLOGY WEEKLY PROGRESS NOTE  Patient ID:   Teresa Hastings  : 1955   MRN: [id number]    Location:  [facility] Radiation Oncology,   [address] [facility] [place]   [phone]     DIAGNOSIS:  Cancer Staging  Oropharyngeal cancer ([facility] [address]  Staging form: Pharynx - HPV-Mediated Oropharynx, AJCC 8th Edition  - Clinical stage from 10/12/2021: Stage I (cT2, cN0, cM0) - Signed by Villa Mariano MD on 10/12/2021      TREATMENT DETAILS:  Treatment Site: L tonsil  Actual Dose: 6800cGy  Total Planned Dose: 7000cGy  Treatment Technique: IMRT  Fraction Technique: Daily  Therapy imaging monitoring: CBCT daily  Concurrent Chemotherapy: NA    SUBJECTIVE:   Patient seen for their weekly on treatment evaluation today. Overall patient is doing well without any acute issues. He has some soreness in the throat but is able to eat regular diet still, though he cannot taste anything. He has increased pain but it is still controlled with Aleve. He does have fatigue but is maintaining his weight. OBJECTIVE:   CHAPERONE: Not Required    ECO Symptomatic but completely ambulatory    VITAL SIGNS: /77   Pulse 58   Temp 98 °F (36.7 °C)   Resp 14   Wt 180 lb (81.6 kg)   SpO2 99%   BMI 27.37 kg/m²   Wt Readings from Last 5 Encounters:   21 180 lb (81.6 kg)   21 179 lb 3.2 oz (81.3 kg)   12/15/21 180 lb (81.6 kg)   12/10/21 182 lb 4.8 oz (82.7 kg)   21 173 lb (78.5 kg)     GENERAL:  General appearance is that of a well-nourished, well-developed in no apparent distress.   HEENT: Normocephalic, atraumatic, EOMI, moist mucosa, (+) erythema. NEUROLOGICAL: Alert and oriented. Strength and sensation intact bilaterally. No focal deficits. PSYCH: Mood normal, behavior normal.  SKIN: Mild erythema, no desquamation. LABS:  CREATININE   Date Value Ref Range Status   10/25/2021 0.79 0.70 - 1.20 mg/dL Final   09/22/2021 0.87 0.70 - 1.20 mg/dL Final     No results found for: , CEA  No results found for: PSA    MEDICATIONS:    Current Outpatient Medications:     hydroCHLOROthiazide (HYDRODIURIL) 12.5 MG tablet, TAKE 1 TABLET BY MOUTH  DAILY, Disp: 60 tablet, Rfl: 5    lisinopril (PRINIVIL;ZESTRIL) 20 MG tablet, TAKE 1 TABLET BY MOUTH  DAILY, Disp: 60 tablet, Rfl: 5    acetaminophen-codeine (TYLENOL #3) 300-30 MG per tablet, , Disp: , Rfl:     omeprazole (PRILOSEC) 20 MG delayed release capsule, Take 20 mg by mouth daily, Disp: , Rfl:     Multiple Vitamins-Minerals (THERAPEUTIC MULTIVITAMIN-MINERALS) tablet, Take 1 tablet by mouth daily, Disp: , Rfl:     simvastatin (ZOCOR) 20 MG tablet, Take 1 tablet by mouth nightly, Disp: 90 tablet, Rfl: 1      ASSESSMENT PLAN:     Treatment setup and plan reviewed. Port images/CBCT images reviewed. Appropriate laboratory work was reviewed. Treatment side effects and toxicities reviewed with the patient, and appropriate management was advised. Will continue radiation treatment as planned, and recommend patient contact us if they have any questions or concerns. Continue managing hydration and nutrition. Recommend patient continue using his Aleve and Tylenol as needed for pain. Patient will follow up in 1 month after finishing tomorrow. He is recommended to follow-up with ENT as well. Electronically signed by Merline Flow, MD on 12/29/2021 at 11:27 AM      Drugs Prescribed:  New Prescriptions    No medications on file       Other Orders Placed:  No orders of the defined types were placed in this encounter.

## 2021-12-29 NOTE — PROGRESS NOTES
Speech Language Pathology    Pt. Problem: Undergoing radiation therapy for head and neck CA,    S: Wolf Daniels was seen by speech language pathology today after a referral from Dr. Mahesh Catalan train in range of motion and strengthening exercises of the tongue, jaw and larynx.  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  Pt w/ mild swallowing deficits characterized by odynophagia and dysgeusia, pain rated at ~3/10. Pt reports he has mouth sores in oral cavity which cause occasional pina when eating/chewing; indicates he takes tylenol and uses baking soda rinse to help with pain associated with sores. Pt reports dysgeusia, however primarily relates that he can't taste anything, rather than PO tasting altered. Pt indicates he has maintained his weight well since beginning radiation. Pt and ST reviewed benefits of PO t/o RT related to swallowing mechanism, however ST also emphasized importance of nutrition and weight maintenance. Pt verbalized understanding. O/A: Upon evaluation, Charo Day presents with functional labial and lingual range of motion and strength. Charo Day speech is 100% intelligible. Pt and ST reviewed potential swallowing deficits related to RT. Pt reports he has occasionally completed OM/EX, but has not consistently completed them as he is tolerating PO intake at this time. ST emphasized OM/EX prophylactic role in radiation related dysphagia and encouraged pt to complete OM/EX 1-2x per day. Pt verbalized understanding. Pt also encouraged to continue to eat/drink PO as able to facilitate use and integrity of swallowing mechanism. P: It is recommended Charo Day complete this program independently with the provided instructions 3-5 times daily prior to, throughout and 3-6 months following radiation treatment. Goal: Independent and preventative exercises to maximize swallow function throughout radiation therapy.     Thank you for this referral.     Trisha Phelan, SLP

## 2021-12-30 ENCOUNTER — HOSPITAL ENCOUNTER (OUTPATIENT)
Dept: RADIATION ONCOLOGY | Age: 66
Discharge: HOME | End: 2021-12-30
Attending: RADIOLOGY
Payer: MEDICARE

## 2021-12-30 ENCOUNTER — APPOINTMENT (OUTPATIENT)
Dept: RADIATION ONCOLOGY | Age: 66
End: 2021-12-30
Attending: RADIOLOGY
Payer: MEDICARE

## 2021-12-30 PROCEDURE — 77386 HC NTSTY MODUL RAD TX DLVR CPLX: CPT | Performed by: RADIOLOGY

## 2021-12-30 PROCEDURE — 77014 PR CT GUIDANCE PLACEMENT RAD THERAPY FIELDS: CPT | Performed by: RADIOLOGY

## 2021-12-30 PROCEDURE — 77427 RADIATION TX MANAGEMENT X5: CPT | Performed by: RADIOLOGY

## 2022-01-19 RX ORDER — SIMVASTATIN 20 MG
TABLET ORAL
Qty: 90 TABLET | Refills: 3 | Status: SHIPPED | OUTPATIENT
Start: 2022-01-19 | End: 2022-11-04 | Stop reason: SDUPTHER

## 2022-01-25 ENCOUNTER — TELEPHONE (OUTPATIENT)
Dept: ONCOLOGY | Age: 67
End: 2022-01-25

## 2022-01-25 ENCOUNTER — OFFICE VISIT (OUTPATIENT)
Dept: ONCOLOGY | Age: 67
End: 2022-01-25
Payer: MEDICARE

## 2022-01-25 ENCOUNTER — HOSPITAL ENCOUNTER (OUTPATIENT)
Dept: RADIATION ONCOLOGY | Age: 67
Discharge: HOME | End: 2022-01-25
Attending: RADIOLOGY
Payer: MEDICARE

## 2022-01-25 VITALS
BODY MASS INDEX: 26.76 KG/M2 | SYSTOLIC BLOOD PRESSURE: 166 MMHG | HEART RATE: 77 BPM | DIASTOLIC BLOOD PRESSURE: 82 MMHG | TEMPERATURE: 98 F | WEIGHT: 176 LBS

## 2022-01-25 VITALS
DIASTOLIC BLOOD PRESSURE: 88 MMHG | SYSTOLIC BLOOD PRESSURE: 159 MMHG | WEIGHT: 176.8 LBS | OXYGEN SATURATION: 98 % | TEMPERATURE: 98 F | BODY MASS INDEX: 26.88 KG/M2 | RESPIRATION RATE: 18 BRPM | HEART RATE: 60 BPM

## 2022-01-25 DIAGNOSIS — C10.9 OROPHARYNGEAL CANCER (HCC): Primary | ICD-10-CM

## 2022-01-25 DIAGNOSIS — C10.9 OROPHARYNGEAL CANCER (HCC): ICD-10-CM

## 2022-01-25 DIAGNOSIS — C09.9 SQUAMOUS CELL CARCINOMA OF LEFT TONSIL (HCC): Primary | ICD-10-CM

## 2022-01-25 DIAGNOSIS — M24.549 CONTRACTURE OF HAND: ICD-10-CM

## 2022-01-25 PROCEDURE — 3017F COLORECTAL CA SCREEN DOC REV: CPT | Performed by: INTERNAL MEDICINE

## 2022-01-25 PROCEDURE — 4040F PNEUMOC VAC/ADMIN/RCVD: CPT | Performed by: INTERNAL MEDICINE

## 2022-01-25 PROCEDURE — G8427 DOCREV CUR MEDS BY ELIG CLIN: HCPCS | Performed by: INTERNAL MEDICINE

## 2022-01-25 PROCEDURE — G8482 FLU IMMUNIZE ORDER/ADMIN: HCPCS | Performed by: INTERNAL MEDICINE

## 2022-01-25 PROCEDURE — 1036F TOBACCO NON-USER: CPT | Performed by: INTERNAL MEDICINE

## 2022-01-25 PROCEDURE — 1123F ACP DISCUSS/DSCN MKR DOCD: CPT | Performed by: INTERNAL MEDICINE

## 2022-01-25 PROCEDURE — 99214 OFFICE O/P EST MOD 30 MIN: CPT | Performed by: INTERNAL MEDICINE

## 2022-01-25 PROCEDURE — 99211 OFF/OP EST MAY X REQ PHY/QHP: CPT | Performed by: INTERNAL MEDICINE

## 2022-01-25 PROCEDURE — 99211 OFF/OP EST MAY X REQ PHY/QHP: CPT | Performed by: RADIOLOGY

## 2022-01-25 PROCEDURE — G8417 CALC BMI ABV UP PARAM F/U: HCPCS | Performed by: INTERNAL MEDICINE

## 2022-01-25 ASSESSMENT — PAIN SCALES - GENERAL: PAINLEVEL_OUTOF10: 1

## 2022-01-25 ASSESSMENT — PAIN DESCRIPTION - LOCATION: LOCATION: THROAT

## 2022-01-25 NOTE — TELEPHONE ENCOUNTER
RTC 3 months    RV scheduled 4/7/22 @ 1pm    PT was given AVS and an appt schedule    Electronically signed by Garry Covington on 1/25/2022 at 2:44 PM

## 2022-01-25 NOTE — PROGRESS NOTES
Patient ID: Vibha Jacobsen, 1955, [id number], 77 y.o. Referred by : Mic Boyce MD  Diagnosis:   Oropharyngeal carcin shirley, left tonsil squamous cell carcinoma, p16 positive, clinical stage T2 a N0 M0, stage I disease     Cancer Staging  Oropharyngeal cancer ([facility] [address]  Staging form: Pharynx - HPV-Mediated Oropharynx, AJCC 8th Edition  - Clinical stage from 10/12/2021: Stage I (cT2, cN0, cM0) - Signed by Placido Abbott MD on 10/12/2021    Patient has been evaluated by ENT surgical oncology at [facility] and as per the tumor board recommendation definitive radiation therapy was recommended   Patient started on definitive radiation therapy on 11/9/2021   He  completed RT on December 29, 2021  HISTORY OF PRESENT ILLNESS:    Oncologic History:  Vibha Jacobsen is 77 y.o. male was seen during initial consultation with for newly diagnosed left tonsillar/oropharyngeal carcinoma. Patient initially presented with sore throat/throat pain in May of this year and was referred to ENT for further evaluation. His ENT evaluation did show 3 cm left tonsillar mass limited to tonsils only. Patient had a CT of the neck on 9/22/2021 which showed 3.2 cm mass in the left palatine tonsil without any abnormal lymphadenopathy. Patient was evaluated by ENT and had a biopsy of the left tonsil which showed poorly differentiated invasive squamous cell carcinoma, p16 positive. Subsequently patient had a PET scan on 10/6/2021 which showed no distant metastasis and no lymph node metastasis noted. Patient has been evaluated by radiation oncology  He denies any unintentional weight loss, drenching night sweats fever chills. INTERVAL HISTORY  Patient is returning for follow-up visit and to discuss lab results further recommendations. He is eating better. He denies any chest pain, shortness of breath abdominal pain nausea matting, fever chills.       During this visit patient's allergy, social, medical, surgical history and medications were reviewed and updated. Past Medical History:   Diagnosis Date    Hyperlipidemia     Hypertension        Past Surgical History:   Procedure Laterality Date    ACHILLES TENDON SURGERY      about 30 years ago        Allergies   Allergen Reactions    Seasonal      Runny nose scratchy throat       Current Outpatient Medications   Medication Sig Dispense Refill    simvastatin (ZOCOR) 20 MG tablet TAKE 1 TABLET BY MOUTH AT  NIGHT 90 tablet 3    hydroCHLOROthiazide (HYDRODIURIL) 12.5 MG tablet TAKE 1 TABLET BY MOUTH  DAILY 60 tablet 5    lisinopril (PRINIVIL;ZESTRIL) 20 MG tablet TAKE 1 TABLET BY MOUTH  DAILY 60 tablet 5    omeprazole (PRILOSEC) 20 MG delayed release capsule Take 20 mg by mouth daily      Multiple Vitamins-Minerals (THERAPEUTIC MULTIVITAMIN-MINERALS) tablet Take 1 tablet by mouth daily      acetaminophen-codeine (TYLENOL #3) 300-30 MG per tablet  (Patient not taking: Reported on 12/10/2021)       No current facility-administered medications for this visit.        Social History     Socioeconomic History    Marital status:      Spouse name: Not on file    Number of children: Not on file    Years of education: Not on file    Highest education level: Not on file   Occupational History    Not on file   Tobacco Use    Smoking status: Former Smoker     Types: Cigarettes    Smokeless tobacco: Never Used   Vaping Use    Vaping Use: Never used   Substance and Sexual Activity    Alcohol use: Yes     Comment: socially     Drug use: Not Currently    Sexual activity: Yes     Partners: Female   Other Topics Concern    Not on file   Social History Narrative    Not on file     Social Determinants of Health     Financial Resource Strain: Low Risk     Difficulty of Paying Living Expenses: Not hard at all   Food Insecurity: No Food Insecurity    Worried About [employer] [employer] in the Last Year: Never true    Judi of Food in the Last Year: Never true   Transportation Needs: No Transportation Needs    Lack of Transportation (Medical): No    Lack of Transportation (Non-Medical): No   Physical Activity:     Days of Exercise per Week: Not on file    Minutes of Exercise per Session: Not on file   Stress: No Stress Concern Present    Feeling of Stress : Not at all   Social Connections:     Frequency of Communication with Friends and Family: Not on file    Frequency of Social Gatherings with Friends and Family: Not on file    Attends Christianity Services: Not on file    Active Member of [address] [place] [address] or Organizations: Not on file    Attends Club or Organization Meetings: Not on file    Marital Status: Not on file   Intimate Partner Violence:     Fear of Current or Ex-Partner: Not on file    Emotionally Abused: Not on file    Physically Abused: Not on file    Sexually Abused: Not on file   Housing Stability:     Unable to Pay for Housing in the Last Year: Not on file    Number of [place] in the Last Year: Not on file    Unstable Housing in the Last Year: Not on file       Family History   Problem Relation Age of Onset    Coronary Art Dis Mother     Cancer Father         REVIEW OF SYSTEM:     Constitutional: No fever or chills. No night sweats, no weight loss   Eyes: No eye discharge, double vision, or eye pain   HEENT: negative for sore mouth, sore throat, hoarseness and voice change   Respiratory: negative for cough , sputum, dyspnea, wheezing, hemoptysis, chest pain   Cardiovascular: negative for chest pain, dyspnea, palpitations, orthopnea, PND   Gastrointestinal: negative for nausea, vomiting, diarrhea, constipation, abdominal pain, Dysphagia, hematemesis and hematochezia   Genitourinary: negative for frequency, dysuria, nocturia, urinary incontinence, and hematuria   Integument: negative for rash, skin lesions, bruises.    Hematologic/Lymphatic: negative for easy bruising, bleeding, lymphadenopathy, petechiae and swelling/edema   Endocrine: negative for heat or cold intolerance, tremor, weight changes, change in bowel habits and hair loss   Musculoskeletal: negative for myalgias, arthralgias, pain, joint swelling,and bone pain   Neurological: negative for headaches, dizziness, seizures, weakness, numbness       OBJECTIVE:         Vitals:    01/25/22 1012   BP: (!) 166/82   Pulse: 77   Temp: 98 °F (36.7 °C)       PHYSICAL EXAM:   General appearance - well appearing, no in pain or distress   Mental status - alert and cooperative   Eyes - pupils equal and reactive, extraocular eye movements intact   Ears - bilateral TM's and external ear canals normal   Mouth - mucous membranes moist, pharynx normal without lesions   Neck - supple, no significant adenopathy   Lymphatics - no palpable lymphadenopathy, no hepatosplenomegaly   Chest - clear to auscultation, no wheezes, rales or rhonchi, symmetric air entry   Heart - normal rate, regular rhythm, normal S1, S2, no murmurs, rubs, clicks or gallops   Abdomen - soft, nontender, nondistended, no masses or organomegaly   Neurological - alert, oriented, normal speech, no focal findings or movement disorder noted   Musculoskeletal - no joint tenderness, deformity or swelling   Extremities - peripheral pulses normal, no pedal edema, no clubbing or cyanosis   Skin - normal coloration and turgor, no rashes, no suspicious skin lesions noted ,      LABORATORY DATA:   No results found for: WBC, HGB, HCT, MCV, PLT, LABLYMP, MID, GRAN, LYMPHOPCT, MIDPERCENT, GRANULOCYTES, RBC, MCH, MCHC, RDW, NEUTOPHILPCT, MONOPCT, EOSPCT, BASOPCT, NEUTROABS, LYMPHSABS, MONOSABS, EOSABS, BASOSABS      Chemistry        Component Value Date/Time    BUN 10 10/25/2021 1000    CREATININE 0.79 10/25/2021 1000    No results found for: CALCIUM, ALKPHOS, AST, ALT, BILITOT     PATHOLOGY DATA:   10/1/2021  8:43 AM - August Pierson Incoming Lab Results From [facility]    Component Collected Lab   Surgical Pathology Report 09/27/2021 10:13  [address]   -- Diagnosis --   LEFT TONSIL, BIOPSIES:             -  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 IMAGING DATA:    PET CT SKULL BASE TO MID THIGH  Narrative: EXAMINATION:  WHOLE BODY PET/CT  10/6/2021    TECHNIQUE:  Following IV injection of 11.4 mCi of F 18 -FDG, PET  tumor imaging was  acquired from the base of the skull to the mid thighs. Computed tomography  was used for purposes of attenuation correction and anatomic localization. Fusion imaging was utilized for interpretation. Uptake time 59 mins. Glucose level 105 mg/dl. COMPARISON:  CT neck 09/22/2021    HISTORY:  Reason for Exam: Malignant neoplasm of tonsillar fossa  Acuity: Acute  Type of Exam: Initial    FINDINGS:  HEAD/NECK: Redemonstration of left palatine tonsillar lesion which is FDG  avid with max SUV of 12.0. No metabolically active cervical lymphadenopathy. 8 mm posterior right thyroid hypodense nodule which is FDG avid with max SUV  of 6.0. CHEST: Left retrocardiac lower lobe nodular area of opacification measuring 2  cm which demonstrates faint uptake with max SUV of 2.3. No metabolically  active axillary, hilar, or mediastinal lymphadenopathy. ABDOMEN/PELVIS: No metabolically active intraperitoneal mass. No  metabolically active abdominal or pelvic lymphadenopathy. Physiologic  activity in the gastrointestinal and genitourinary systems. BONES/SOFT TISSUE: No abnormal FDG activity localizes to the bones. No  aggressive osseous lesion. INCIDENTAL CT FINDINGS: Multivessel coronary artery calcification. Aortic  atherosclerosis most pronounced in the infrarenal segment. Sigmoid  diverticulosis. Impression: 1. Redemonstration of left palatine tonsillar lesion which is FDG avid  consistent with history of malignancy.   2. Posterior right thyroid hypodense nodule which is FDG avid, recommend  further evaluation with dedicated ultrasound. 3. Left retrocardiac lower lobe nodular area of opacification as measured  above which demonstrates faint uptake and is favored to represent possible  pneumonia or other infectious/inflammatory process though  metastatic/neoplastic etiology not definitively excluded. Recommend post  treatment chest CT in 6-8 weeks to reassess. If persistent at time of  follow-up then further evaluation can be obtained with tissue sampling. ASSESSMENT:    Stormy Silverio is a 77 y.o. pleasant male who was seen during initial consultation visit for newly diagnosed oropharyngeal cancer, p16 positive, clinical stage T2 a N0 M0, stage I disease. I reviewed the NCCN guidelines and goals of care with patient. I discussed the care plan with radiation oncologist Dr. Evelyn Goetz. I explained that patient has early stage disease and as per the NCCN guidelines surgery would be recommended. He was evaluated by ENT at [facility] and recommended definitive radiation therapy. No indication for chemotherapy at this point. Thyroid nodule    During today's visit, the patient and the family had a number of reasonable questions which were answered to their satisfaction. They verbalized understanding of the information provided and they agreed to proceed as outlined above. PLAN:   I reviewed the laboratory data, discussed the diagnosis and treatment recommendations   He has completed radiation therapy and now recovering well   He is scheduled to have PET scan in 3 months   Return to clinic in 3 months or earlier if needed         Charles Yusuf MD  Hematologist/Medical Oncologist      This note is created with the assistance of a speech recognition program.  While intending to generate a document that actually reflects the content of the visit, the document can still have some errors including those of syntax and sound a like substitutions which may escape proof reading.   It such instances, actual meaning can be extrapolated by contextual diversion.     Vida Zarco MD

## 2022-01-25 NOTE — PROGRESS NOTES
Tania Swift  1/25/2022  9:43 AM      Vitals:    01/25/22 0930   BP: (!) 159/88   Pulse: 60   Resp: 18   Temp: 98 °F (36.7 °C)   SpO2: 98%    :  Patient Currently in Pain: Yes     Pain Level: 1       Wt Readings from Last 1 Encounters:   01/25/22 176 lb 12.8 oz (80.2 kg)                Current Outpatient Medications:     simvastatin (ZOCOR) 20 MG tablet, TAKE 1 TABLET BY MOUTH AT  NIGHT, Disp: 90 tablet, Rfl: 3    hydroCHLOROthiazide (HYDRODIURIL) 12.5 MG tablet, TAKE 1 TABLET BY MOUTH  DAILY, Disp: 60 tablet, Rfl: 5    lisinopril (PRINIVIL;ZESTRIL) 20 MG tablet, TAKE 1 TABLET BY MOUTH  DAILY, Disp: 60 tablet, Rfl: 5    acetaminophen-codeine (TYLENOL #3) 300-30 MG per tablet, , Disp: , Rfl:     omeprazole (PRILOSEC) 20 MG delayed release capsule, Take 20 mg by mouth daily, Disp: , Rfl:     Multiple Vitamins-Minerals (THERAPEUTIC MULTIVITAMIN-MINERALS) tablet, Take 1 tablet by mouth daily, Disp: , Rfl:                FALLS RISK SCREEN  Instructions:  Assess the patient and enter the appropriate indicators that are present for fall risk identification. Total the numbers entered and assign a fall risk score from Table 2.  Reassess patient at a minimum every 12 weeks or with status change. Assessment   Date  1/25/2022     1. Mental Ability: confusion/cognitively impaired 0     2. Elimination Issues: incontinence, frequency 0       3. Ambulatory: use of assistive devices (walker, cane, off-loading devices),        attached to equipment (IV pole, oxygen) 0     4. Sensory Limitations: dizziness, vertigo, impaired vision 0     5. Age less than 65        0     6. Age 72 or greater 1     7. Medication: diuretics, strong analgesics, hypnotics, sedatives,        antihypertensive agents 0   8. Falls:  recent history of falls within the last 3 months (not to include slipping or        tripping) 0   TOTAL 1    If score of 4 or greater was education given?  No           TABLE 2   Risk Score Risk Level Plan of Care   0-3 Little or  No Risk 1. Provide assistance as indicated for ambulation activities  2. Reorient confused/cognitively impaired patient  3. Chair/bed in low position, stretcher/bed with siderails up except when performing patient care activities  5. Educate patient/family/caregiver on falls prevention  6.  Reassess in 12 weeks or with any noted change in patient condition which places them at a risk for a fall   4-6 Moderate Risk 1. Provide assistance as indicated for ambulation activities  2. Reorient confused/cognitively impaired patient  3. Chair/bed in low position, stretcher/bed with siderails up except when performing patient care activities  4. Educate patient/family/caregiver on falls prevention     7 or   Higher High Risk 1. Place patient in easily observable treatment room  2. Patient attended at all times by family member or staff  3. Provide assistance as indicated for ambulation activities  4. Reorient confused/cognitively impaired patient  5. Chair/bed in low position, stretcher/bed with siderails up except when performing patient care activities  6. Educate patient/family/caregiver on falls prevention         PLAN: Patient is seen today in follow up. He reports improvement in his throat pain. Dr Opal Rivera notified and examined pt. Pt will have a CT soon and a PET in a few months and f/u with RO after.           Jeremy Pretty RN

## 2022-01-28 NOTE — PROGRESS NOTES
[address]            Radiation Oncology          [address] [facility]          [place] [address]        Alec Victor: [phone]        F: [phone]       [place] [id number] [facility]       Radiation Oncology   [address] [facility], [address] [facility]       Alec [phone]       F: [phone]       [employer]      Dear Dr Valery Tafoya:    Thank you for referring Jake Dixon to me for evaluation and treatment. Below is a summary of the patient's recently completed radiation course. If you have questions, please do not hesitate to call me. I look forward to following this patient along with you. Sincerely,  Electronically signed by Keo Maier MD on 22 at 8:44 AM EST      CC: Patient Care Team:  Fadumo Garcia MD as PCP - General (Family Medicine)  Fadumo Garcia MD as PCP - [facility] Provider  Marquis Bar RN as Nurse Navigator (Oncology)  ------------------------------------------------------------------------------------------------------------------------------------------------------------------------------------------        Date of Service: 2021     Location:  [facility] Radiation Oncology,   [address] [facility] [place]   [phone]        RADIATION ONCOLOGY END OF TREATMENT SUMMARY:    Patient ID:   Jake Dixon  : 1955   MRN: [id number]    DIAGNOSIS:  Cancer Staging  Oropharyngeal cancer ([facility] [address]  Staging form: Pharynx - HPV-Mediated Oropharynx, AJCC 8th Edition  - Clinical stage from 10/12/2021: Stage I (cT2, cN0, cM0) - Signed by Ladarius Royal MD on 10/12/2021      TREATMENT DETAILS:    Treatment Technique: IMRT  Fraction Technique: Daily          Concurrent Chemotherapy: NA    CLINICAL COURSE:    Patient completed the treatment as prescribed and tolerated treatment as expected. Patient developed esophagitis and fatigue.  Patient will come back in 1 month for a follow up visit and to assess treatment toxicity and response. Patient was advised to continue close follow up with medical oncology and ENT as well. Patient does have our contact information in case they have any questions or concerns in the interim.     Electronically signed by Keven Rahman MD on 1/28/2022 at 8:44 AM

## 2022-01-28 NOTE — PROGRESS NOTES
[facility]            Radiation Oncology          [address] [facility]          [address]        [place]: [phone]        F: [phone]       [employer]. [address] [place] [address]       Radiation Oncology   [address] [facility], [address] [facility]       [place]: [phone]       F: [phone]       [email or web address]      Date of Service: 2022     Location:  [facility],   [address] [facility] [place]   [phone]        RADIATION ONCOLOGY FOLLOW UP NOTE    Patient ID:   Hardeep Gabriel  : 1955   MRN: [id number]    DIAGNOSIS:  Cancer Staging  Oropharyngeal cancer ([facility] [address]  Staging form: Pharynx - HPV-Mediated Oropharynx, AJCC 8th Edition  - Clinical stage from 10/12/2021: Stage I (cT2, cN0, cM0) - Signed by Jennie Lyon MD on 10/12/2021    -s/p RT 70Gy 21    INTERVAL HISTORY:   Hardeep Gabriel is a 77 y.o.. male with a history of a left tonsillar squamous cell carcinoma treated with a course of definitive radiation therapy completing approximately 1 month ago. Patient is here today for routine follow-up visit and exam.  Overall he is recovering well. He notes that he is able to eat a regular diet again though still has some trouble with taste. He denies any significant throat pain and is not taking any pain medication. He denies any swelling or trouble with range of motion in his neck. He denies any skin irritation or itching. He denies any other symptoms of headaches, dizziness, chest pain, shortness of breath, abdominal pain, nausea, diarrhea, bony pain, swelling, or bleeding. His weight has been stable and he will be is doing his speech therapy exercises. Patient has not yet seen Dr. Gia Villalobos in ENT.        MEDICATIONS:    Current Outpatient Medications:     simvastatin (ZOCOR) 20 MG tablet, TAKE 1 TABLET BY MOUTH AT  NIGHT, Disp: 90 tablet, Rfl: 3    hydroCHLOROthiazide (HYDRODIURIL) 12.5 MG tablet, TAKE 1 TABLET BY MOUTH  DAILY, Disp: 60 tablet, Rfl: 5    lisinopril (PRINIVIL;ZESTRIL) 20 MG tablet, TAKE 1 TABLET BY MOUTH  DAILY, Disp: 60 tablet, Rfl: 5    acetaminophen-codeine (TYLENOL #3) 300-30 MG per tablet, , Disp: , Rfl:     omeprazole (PRILOSEC) 20 MG delayed release capsule, Take 20 mg by mouth daily, Disp: , Rfl:     Multiple Vitamins-Minerals (THERAPEUTIC MULTIVITAMIN-MINERALS) tablet, Take 1 tablet by mouth daily, Disp: , Rfl:     ALLERGIES:  Allergies   Allergen Reactions    Seasonal      Runny nose scratchy throat         REVIEW OF SYSTEMS:    A full 14 point review of systems was performed and assessed and found to be negative except as noted above. PHYSICAL EXAMINATION:    CHAPERONE: Not Required    ECO Asymptomatic    VITAL SIGNS: BP (!) 159/88   Pulse 60   Temp 98 °F (36.7 °C)   Resp 18   Wt 176 lb 12.8 oz (80.2 kg)   SpO2 98%   BMI 26.88 kg/m²    Wt Readings from Last 3 Encounters:   22 176 lb 12.8 oz (80.2 kg)   22 176 lb (79.8 kg)   21 180 lb (81.6 kg)       GENERAL:  General appearance is that of a well-nourished, well-developed in no apparent distress. HEENT: Normocephalic, atraumatic, EOMI, moist mucosa, no erythema. NECK:  No adenopathy or a palpable thyroid mass, trachea is midline. LYMPHATICS: No cervical, supraclavicular adenopathy. HEART:  Normal rate and regular rhythm, normal heart sounds. LUNGS:  Pulmonary effort normal. Normal breath sounds. ABDOMEN:  Soft, nontender, non distended. Right  EXTREMITIES:  No edema. No calf tenderness. MSK:  No spinal tenderness. Normal ROM. NEUROLOGICAL: Alert and oriented. Strength and sensation intact bilaterally. No focal deficits. PSYCH: Mood normal, behavior normal.  SKIN: No erythema, no desquamation.         LABS:  No results found for: WBC, NEUTROABS, HGB, PLT  No results found for: , CEA  No results found for: PSA    IMAGING:   None      ASSESSMENT AND PLAN:  Triston Solomon is a 77 y.o. male with a Cancer Staging  Oropharyngeal cancer ([facility] [address]  Staging form: Pharynx - HPV-Mediated Oropharynx, AJCC 8th Edition  - Clinical stage from 10/12/2021: Stage I (cT2, cN0, cM0) - Signed by Juan Manuel Clifton MD on 10/12/2021  . Patient comes in today for 1 month follow-up after completion of his definitive radiation therapy for his tonsillar cancer. Overall he is recovering well noting improvement in his symptoms from treatment. We will recommend patient undergo a CT of the neck to assess treatment response thus far. Patient will be recommended to follow-up with ENT as well in the next few weeks. We will order a PET scan for follow-up in 2 months to assess treatment response as well. Patient is recommended to continue working on his nutrition and hydration and contact us should you have any questions or concerns. Patient was in agreement with my recommendations. All questions were answered to their satisfaction. Patient was advised to contact us anytime should they have any questions or concerns. Electronically signed by Azul Arellano MD on 1/28/2022 at 8:42 AM        Medications Prescribed:   New Prescriptions    No medications on file       Orders:   Orders Placed This Encounter   Procedures    CT SOFT TISSUE NECK W CONTRAST    CBC    Basic Metabolic Panel    AFL([facility]) - Terri Pena. Martita Roger MD, Plastic Surgery, [place]       CC:  Patient Care Team:  Anel Taylor MD as PCP - General (Family Medicine)  Anel Taylor MD as PCP - [facility] Empaneled Provider  Ananda Bergeron RN as Nurse Navigator (Oncology)     Total time spent on this case on the day of encounter is more than 30 minutes.  This time includes combination of one or more of the following - review of necessary tests, review of pertinent medical records from the EMR, performing medically appropriate examination and evaluation, counseling and educating the patient/family/caregiver, ordering necessary medical tests, procedures etc., documenting the clinical information in the electronic medical record, care coordination, referring and communicating with other health care providers and interpretation of results independently. This note is created with the assistance of a speech recognition program.  While intending to generate a document that actually reflects the content of the visit, the document can still have some errors including those of syntax and sound a like substitutions which may escape proof reading. It such instances, actual meaning can be extrapolated by contextual diversion.

## 2022-01-31 ENCOUNTER — HOSPITAL ENCOUNTER (OUTPATIENT)
Age: 67
Discharge: HOME | End: 2022-01-31
Payer: MEDICARE

## 2022-01-31 ENCOUNTER — HOSPITAL ENCOUNTER (OUTPATIENT)
Dept: CT IMAGING | Age: 67
Discharge: HOME | End: 2022-02-02
Payer: MEDICARE

## 2022-01-31 DIAGNOSIS — C10.9 OROPHARYNGEAL CANCER (HCC): ICD-10-CM

## 2022-01-31 LAB
ANION GAP SERPL CALCULATED.3IONS-SCNC: 15 MMOL/L (ref 9–17)
BUN BLDV-MCNC: 13 MG/DL (ref 8–23)
BUN/CREAT BLD: ABNORMAL (ref 9–20)
CALCIUM SERPL-MCNC: 10.2 MG/DL (ref 8.6–10.4)
CHLORIDE BLD-SCNC: 98 MMOL/L (ref 98–107)
CO2: 25 MMOL/L (ref 20–31)
CREAT SERPL-MCNC: 0.9 MG/DL (ref 0.7–1.2)
CREAT SERPL-MCNC: 0.91 MG/DL (ref 0.7–1.2)
GFR AFRICAN AMERICAN: >60 ML/MIN
GFR AFRICAN AMERICAN: >60 ML/MIN
GFR NON-AFRICAN AMERICAN: >60 ML/MIN
GFR NON-AFRICAN AMERICAN: >60 ML/MIN
GFR SERPL CREATININE-BSD FRML MDRD: ABNORMAL ML/MIN/{1.73_M2}
GFR SERPL CREATININE-BSD FRML MDRD: ABNORMAL ML/MIN/{1.73_M2}
GFR SERPL CREATININE-BSD FRML MDRD: NORMAL ML/MIN/{1.73_M2}
GFR SERPL CREATININE-BSD FRML MDRD: NORMAL ML/MIN/{1.73_M2}
GLUCOSE BLD-MCNC: 106 MG/DL (ref 70–99)
HCT VFR BLD CALC: 45.9 % (ref 40.7–50.3)
HEMOGLOBIN: 15.4 G/DL (ref 13–17)
MCH RBC QN AUTO: 29.8 PG (ref 25.2–33.5)
MCHC RBC AUTO-ENTMCNC: 33.6 G/DL (ref 28.4–34.8)
MCV RBC AUTO: 89 FL (ref 82.6–102.9)
NRBC AUTOMATED: 0 PER 100 WBC
PDW BLD-RTO: 12.6 % (ref 11.8–14.4)
PLATELET # BLD: 349 K/UL (ref 138–453)
PMV BLD AUTO: 10.2 FL (ref 8.1–13.5)
POTASSIUM SERPL-SCNC: 4.3 MMOL/L (ref 3.7–5.3)
RBC # BLD: 5.16 M/UL (ref 4.21–5.77)
SODIUM BLD-SCNC: 138 MMOL/L (ref 135–144)
WBC # BLD: 5.1 K/UL (ref 3.5–11.3)

## 2022-01-31 PROCEDURE — 6360000004 HC RX CONTRAST MEDICATION: Performed by: RADIOLOGY

## 2022-01-31 PROCEDURE — 2580000003 HC RX 258: Performed by: RADIOLOGY

## 2022-01-31 PROCEDURE — 80048 BASIC METABOLIC PNL TOTAL CA: CPT

## 2022-01-31 PROCEDURE — 70491 CT SOFT TISSUE NECK W/DYE: CPT

## 2022-01-31 PROCEDURE — 82565 ASSAY OF CREATININE: CPT

## 2022-01-31 PROCEDURE — 85027 COMPLETE CBC AUTOMATED: CPT

## 2022-01-31 PROCEDURE — 36415 COLL VENOUS BLD VENIPUNCTURE: CPT

## 2022-01-31 RX ORDER — SODIUM CHLORIDE 0.9 % (FLUSH) 0.9 %
10 SYRINGE (ML) INJECTION PRN
Status: DISCONTINUED | OUTPATIENT
Start: 2022-01-31 | End: 2022-02-03 | Stop reason: HOSPADM

## 2022-01-31 RX ORDER — 0.9 % SODIUM CHLORIDE 0.9 %
80 INTRAVENOUS SOLUTION INTRAVENOUS ONCE
Status: COMPLETED | OUTPATIENT
Start: 2022-01-31 | End: 2022-01-31

## 2022-01-31 RX ADMIN — IOPAMIDOL 75 ML: 755 INJECTION, SOLUTION INTRAVENOUS at 10:50

## 2022-01-31 RX ADMIN — SODIUM CHLORIDE, PRESERVATIVE FREE 10 ML: 5 INJECTION INTRAVENOUS at 10:50

## 2022-01-31 RX ADMIN — SODIUM CHLORIDE 80 ML: 9 INJECTION, SOLUTION INTRAVENOUS at 10:50

## 2022-02-25 ENCOUNTER — TELEPHONE (OUTPATIENT)
Dept: ONCOLOGY | Age: 67
End: 2022-02-25

## 2022-02-25 NOTE — TELEPHONE ENCOUNTER
Name: Dayami Bonilla  : 1955  MRN: [id number]    Oncology Navigation Follow-Up Note    Contact Type:  Telephone  Notes: Spoke with pt for f/u call. Pt stated completed Dr. Imani Qureshi f/u this am.  Per pt Dr. Imani Qureshi request pt speak with Dr. Chidi Monterroso in regards to timing of PET/CT scan. Pt stated Dr. Imani Qureshi preferred PET/CT scan completed 2022. Instructed pt Dr. Chidi Monterroso out of office today Thelda Pain will speak with Dr. Chidi Monterroso upon return to office . Pt verbalized understanding & denied questions/concerns. Instructed pt may contact writer prn. [facility], [facility] , updated on conversation with pt. Will continue to follow.     Electronically signed by Julia Hilliard RN on 2022 at 12:56 PM

## 2022-02-28 ENCOUNTER — TELEPHONE (OUTPATIENT)
Dept: ONCOLOGY | Age: 67
End: 2022-02-28

## 2022-02-28 NOTE — TELEPHONE ENCOUNTER
Name: Lord Agosto  : 1955  MRN: [id number]    Oncology Navigation Follow-Up Note    Contact Type:  Telephone  Notes: Dr. Jesus Porter notified per pt Dr. Fanta Klein inquired on rescheduling PET/CT scan to 2022. Dr. Jesus Porter requested PET/CT scan remain as scheduled on 3/28. Spoke with Tiesha Holter [facility] , inquired on coordinating dual appt 3/29. Wilbur Lira stated unable r/t Dr. Jeff Hernandes out of office. Spoke with pt updated on Dr. David Jimenez response & notified unable to coordinate dual RO/MO appt. Pt verbalized understanding, denied questions/concerns, & thanked writer. Instructed pt may contact writer prn. Will continue to follow.     Electronically signed by Bonnie Su RN on 2022 at 8:18 AM

## 2022-03-28 ENCOUNTER — HOSPITAL ENCOUNTER (OUTPATIENT)
Dept: NUCLEAR MEDICINE | Age: 67
Discharge: HOME | End: 2022-03-30
Payer: MEDICARE

## 2022-03-28 DIAGNOSIS — C09.9 SQUAMOUS CELL CARCINOMA OF LEFT TONSIL (HCC): ICD-10-CM

## 2022-03-28 LAB — GLUCOSE BLD-MCNC: 101 MG/DL (ref 75–110)

## 2022-03-28 PROCEDURE — 82947 ASSAY GLUCOSE BLOOD QUANT: CPT

## 2022-03-28 PROCEDURE — 78815 PET IMAGE W/CT SKULL-THIGH: CPT

## 2022-03-28 PROCEDURE — 3430000000 HC RX DIAGNOSTIC RADIOPHARMACEUTICAL: Performed by: RADIOLOGY

## 2022-03-28 PROCEDURE — 2580000003 HC RX 258: Performed by: RADIOLOGY

## 2022-03-28 PROCEDURE — A9552 F18 FDG: HCPCS | Performed by: RADIOLOGY

## 2022-03-28 RX ORDER — SODIUM CHLORIDE 0.9 % (FLUSH) 0.9 %
10 SYRINGE (ML) INJECTION ONCE
Status: COMPLETED | OUTPATIENT
Start: 2022-03-28 | End: 2022-03-28

## 2022-03-28 RX ORDER — FLUDEOXYGLUCOSE F 18 200 MCI/ML
10 INJECTION, SOLUTION INTRAVENOUS
Status: COMPLETED | OUTPATIENT
Start: 2022-03-28 | End: 2022-03-28

## 2022-03-28 RX ADMIN — FLUDEOXYGLUCOSE F 18 10.6 MILLICURIE: 200 INJECTION, SOLUTION INTRAVENOUS at 10:50

## 2022-03-28 RX ADMIN — SODIUM CHLORIDE, PRESERVATIVE FREE 10 ML: 5 INJECTION INTRAVENOUS at 10:49

## 2022-03-29 ENCOUNTER — HOSPITAL ENCOUNTER (OUTPATIENT)
Dept: RADIATION ONCOLOGY | Age: 67
Discharge: HOME | End: 2022-03-29
Attending: RADIOLOGY
Payer: MEDICARE

## 2022-03-29 ENCOUNTER — HOSPITAL ENCOUNTER (OUTPATIENT)
Age: 67
Discharge: HOME | End: 2022-03-29
Payer: MEDICARE

## 2022-03-29 VITALS
OXYGEN SATURATION: 99 % | SYSTOLIC BLOOD PRESSURE: 200 MMHG | RESPIRATION RATE: 18 BRPM | WEIGHT: 174.4 LBS | HEART RATE: 68 BPM | BODY MASS INDEX: 26.52 KG/M2 | TEMPERATURE: 98.1 F | DIASTOLIC BLOOD PRESSURE: 84 MMHG

## 2022-03-29 DIAGNOSIS — R91.1 LEFT LOWER LOBE PULMONARY NODULE: Primary | ICD-10-CM

## 2022-03-29 DIAGNOSIS — C10.9 OROPHARYNGEAL CANCER (HCC): ICD-10-CM

## 2022-03-29 LAB
ALBUMIN SERPL-MCNC: 4.9 G/DL (ref 3.5–5.2)
ALBUMIN/GLOBULIN RATIO: 1.8 (ref 1–2.5)
ALP BLD-CCNC: 82 U/L (ref 40–129)
ALT SERPL-CCNC: 20 U/L (ref 5–41)
ANION GAP SERPL CALCULATED.3IONS-SCNC: 14 MMOL/L (ref 9–17)
AST SERPL-CCNC: 19 U/L
BILIRUB SERPL-MCNC: 1.08 MG/DL (ref 0.3–1.2)
BUN BLDV-MCNC: 14 MG/DL (ref 8–23)
CALCIUM SERPL-MCNC: 10.2 MG/DL (ref 8.6–10.4)
CHLORIDE BLD-SCNC: 103 MMOL/L (ref 98–107)
CO2: 25 MMOL/L (ref 20–31)
CREAT SERPL-MCNC: 0.85 MG/DL (ref 0.7–1.2)
GFR AFRICAN AMERICAN: >60 ML/MIN
GFR NON-AFRICAN AMERICAN: >60 ML/MIN
GFR SERPL CREATININE-BSD FRML MDRD: ABNORMAL ML/MIN/{1.73_M2}
GLUCOSE BLD-MCNC: 108 MG/DL (ref 70–99)
HCT VFR BLD CALC: 44.7 % (ref 40.7–50.3)
HEMOGLOBIN: 15.3 G/DL (ref 13–17)
MCH RBC QN AUTO: 29.8 PG (ref 25.2–33.5)
MCHC RBC AUTO-ENTMCNC: 34.2 G/DL (ref 28.4–34.8)
MCV RBC AUTO: 87.1 FL (ref 82.6–102.9)
NRBC AUTOMATED: 0 PER 100 WBC
PDW BLD-RTO: 12.7 % (ref 11.8–14.4)
PLATELET # BLD: 355 K/UL (ref 138–453)
PMV BLD AUTO: 10 FL (ref 8.1–13.5)
POTASSIUM SERPL-SCNC: 4.5 MMOL/L (ref 3.7–5.3)
RBC # BLD: 5.13 M/UL (ref 4.21–5.77)
SODIUM BLD-SCNC: 142 MMOL/L (ref 135–144)
TOTAL PROTEIN: 7.6 G/DL (ref 6.4–8.3)
TSH SERPL DL<=0.05 MIU/L-ACNC: 1.07 UIU/ML (ref 0.3–5)
WBC # BLD: 6.2 K/UL (ref 3.5–11.3)

## 2022-03-29 PROCEDURE — 99214 OFFICE O/P EST MOD 30 MIN: CPT | Performed by: RADIOLOGY

## 2022-03-29 PROCEDURE — 99211 OFF/OP EST MAY X REQ PHY/QHP: CPT | Performed by: RADIOLOGY

## 2022-03-29 PROCEDURE — 36415 COLL VENOUS BLD VENIPUNCTURE: CPT

## 2022-03-29 PROCEDURE — 84443 ASSAY THYROID STIM HORMONE: CPT

## 2022-03-29 PROCEDURE — 85027 COMPLETE CBC AUTOMATED: CPT

## 2022-03-29 PROCEDURE — 80053 COMPREHEN METABOLIC PANEL: CPT

## 2022-03-29 ASSESSMENT — PAIN DESCRIPTION - DESCRIPTORS: DESCRIPTORS: SORE

## 2022-03-29 ASSESSMENT — PAIN DESCRIPTION - LOCATION: LOCATION: THROAT

## 2022-03-29 ASSESSMENT — PAIN SCALES - GENERAL: PAINLEVEL_OUTOF10: 1

## 2022-03-29 ASSESSMENT — PAIN DESCRIPTION - PAIN TYPE: TYPE: ACUTE PAIN

## 2022-03-29 NOTE — PROGRESS NOTES
Taya Barksdale  3/29/2022  1:29 PM      Vitals:    03/29/22 1326   BP: (!) 200/84   Pulse: 68   Resp: 18   Temp: 98.1 °F (36.7 °C)   SpO2: 99%    :  Patient Currently in Pain: Yes     Pain Level: 1       Wt Readings from Last 1 Encounters:   03/29/22 174 lb 6.4 oz (79.1 kg)                Current Outpatient Medications:     simvastatin (ZOCOR) 20 MG tablet, TAKE 1 TABLET BY MOUTH AT  NIGHT, Disp: 90 tablet, Rfl: 3    hydroCHLOROthiazide (HYDRODIURIL) 12.5 MG tablet, TAKE 1 TABLET BY MOUTH  DAILY, Disp: 60 tablet, Rfl: 5    lisinopril (PRINIVIL;ZESTRIL) 20 MG tablet, TAKE 1 TABLET BY MOUTH  DAILY, Disp: 60 tablet, Rfl: 5    acetaminophen-codeine (TYLENOL #3) 300-30 MG per tablet, , Disp: , Rfl:     omeprazole (PRILOSEC) 20 MG delayed release capsule, Take 20 mg by mouth daily, Disp: , Rfl:     Multiple Vitamins-Minerals (THERAPEUTIC MULTIVITAMIN-MINERALS) tablet, Take 1 tablet by mouth daily, Disp: , Rfl:     Immunizations:    Influenza status:    [x]   Current   []   Patient declined    Pneumococcal status:  [x]   Current  []   Patient declined  Covid status:   [x]  Dose #1:                     [x]  Dose #2:               []   Patient declined    Smoking Status:    [] Smoker - PPD:   [x] Nonsmoker - Quit Date:               [] Never a smoker      Cancer Screening:  Colonoscopy   [] Current       [x] Not current   [] Not current, but scheduled   [] NA  Mammogram   [] Current       [] Not current   [] Not current, but scheduled   [x] NA  Prostate           [] Current       [x] Not current   [] Not current, but scheduled   [] NA  PAP/Pelvic      [] Current       [] Not current   [] Not current, but scheduled   [x] NA  Skin                 [] Current       [x] Not current   [] Not current, but scheduled   [] NA     Hormone:  Lupron []   Last dose given:           Next dose due:   Eligard []   Last dose given:           Next dose due:   Aromatase Inhibitors []   Medication name:   N/A:  []           FALLS RISK confused/cognitively impaired patient  5. Chair/bed in low position, stretcher/bed with siderails up except when performing patient care activities  6. Educate patient/family/caregiver on falls prevention         PLAN: Patient is seen today in follow up. C/o of some sore throat at times and dry mouth. Manageable per pt. Continues to see ENT every 3 months and had recent CT scan. Dr. Nir Madden updated and examined pt. Per MD pt will need a Lung Biopsy done in IR and will follow up after.         Rob Rubio RN

## 2022-03-30 ENCOUNTER — TELEPHONE (OUTPATIENT)
Dept: RADIATION ONCOLOGY | Age: 67
End: 2022-03-30

## 2022-03-30 NOTE — TELEPHONE ENCOUNTER
Patient scheduled for lung bx 4/6 @ [address]   Follow up scheduled for Dr. Dennis Driscoll and Dr. Immanuel Dooley on 4/12/22 @130pm.  Patient aware of new appointments.

## 2022-03-30 NOTE — PROGRESS NOTES
[facility]            Radiation Oncology          [address]           [address] [facility] [address]        [place]: [phone]        F: [phone]       [address] [place] [address]       Radiation Oncology   [address] [facility], [address]       [place]: [phone]       F: [phone]       [email or web address]      Date of Service: 3/29/2022     Location:  [facility],   [address] [facility],  [address], [facility]   [phone]        RADIATION ONCOLOGY FOLLOW UP NOTE    Patient ID:   Tania Swift  : 1955   MRN: [id number]    DIAGNOSIS:  Cancer Staging  Oropharyngeal cancer ([facility] [address]  Staging form: Pharynx - HPV-Mediated Oropharynx, AJCC 8th Edition  - Clinical stage from 10/12/2021: Stage I (cT2, cN0, cM0) - Signed by Rachel Talley MD on 10/12/2021    -s/p RT 70Gy 21    INTERVAL HISTORY:   Tania Swift is a 77 y.o.. male with a history of a left tonsillar squamous cell carcinoma treated with a course of definitive radiation therapy completing approximately 3 months ago. Patient is here today for routine follow-up visit and exam.  Overall he is recovering well. He notes that he is able to eat a regular diet again and has improvement in his taste. He denies any significant throat pain and is not taking any pain medication. He denies any swelling or trouble with range of motion in his neck. He denies any skin irritation or itching. He denies any other symptoms of headaches, dizziness, chest pain, shortness of breath, abdominal pain, nausea, diarrhea, bony pain, swelling, or bleeding. He does report some fatigue but is working on improving his activity levels. His weight has been stable, however he has not yet followed up with his dentist but plans to. He did see Dr. Yadira Cox in ENT in February who noted no abnormalities on exam and will be seeing him again in .   Patient had a PET scan done on 2022 which showed no evidence of primary disease in the tonsil or any lymphadenopathy. There continue to be a small nodule in the thyroid as well as left lower lobe pulmonary nodule as well. MEDICATIONS:    Current Outpatient Medications:     simvastatin (ZOCOR) 20 MG tablet, TAKE 1 TABLET BY MOUTH AT  NIGHT, Disp: 90 tablet, Rfl: 3    hydroCHLOROthiazide (HYDRODIURIL) 12.5 MG tablet, TAKE 1 TABLET BY MOUTH  DAILY, Disp: 60 tablet, Rfl: 5    lisinopril (PRINIVIL;ZESTRIL) 20 MG tablet, TAKE 1 TABLET BY MOUTH  DAILY, Disp: 60 tablet, Rfl: 5    acetaminophen-codeine (TYLENOL #3) 300-30 MG per tablet, , Disp: , Rfl:     omeprazole (PRILOSEC) 20 MG delayed release capsule, Take 20 mg by mouth daily, Disp: , Rfl:     Multiple Vitamins-Minerals (THERAPEUTIC MULTIVITAMIN-MINERALS) tablet, Take 1 tablet by mouth daily, Disp: , Rfl:     ALLERGIES:  Allergies   Allergen Reactions    Seasonal      Runny nose scratchy throat         REVIEW OF SYSTEMS:    A full 14 point review of systems was performed and assessed and found to be negative except as noted above. PHYSICAL EXAMINATION:    CHAPERONE: Family/friend/companieon Present    ECO Asymptomatic    VITAL SIGNS: BP (!) 200/84   Pulse 68   Temp 98.1 °F (36.7 °C)   Resp 18   Wt 174 lb 6.4 oz (79.1 kg)   SpO2 99%   BMI 26.52 kg/m²    Wt Readings from Last 3 Encounters:   22 174 lb 6.4 oz (79.1 kg)   22 176 lb 12.8 oz (80.2 kg)   22 176 lb (79.8 kg)       GENERAL:  General appearance is that of a well-nourished, well-developed in no apparent distress. HEENT: Normocephalic, atraumatic, EOMI, moist mucosa, no erythema. NECK:  No adenopathy or a palpable thyroid mass, trachea is midline. LYMPHATICS: No cervical, supraclavicular adenopathy. HEART:  Normal rate and regular rhythm, normal heart sounds. LUNGS:  Pulmonary effort normal. Normal breath sounds. ABDOMEN:  Soft, nontender, non distended.   Right  EXTREMITIES: No edema. No calf tenderness. MSK:  No spinal tenderness. Normal ROM. NEUROLOGICAL: Alert and oriented. Strength and sensation intact bilaterally. No focal deficits. PSYCH: Mood normal, behavior normal.  SKIN: No erythema, no desquamation. LABS:  WBC   Date Value Ref Range Status   03/29/2022 6.2 3.5 - 11.3 k/uL Final     Hemoglobin   Date Value Ref Range Status   03/29/2022 15.3 13.0 - 17.0 g/dL Final     Platelets   Date Value Ref Range Status   03/29/2022 355 138 - 453 k/uL Final     No results found for: , CEA  No results found for: PSA    IMAGING:   3/28/22 PET Scan  Impression   NIRADS score for Neck Primary: 1: No evidence of recurrence: routine   surveillance, CECT       NIRADS score for Neck Nodes: 1: No evidence of recurrence: routine   surveillance.       There continues to be focal activity in a small nodule in the right thyroid   lobe.       Left lower lobe pulmonary nodule with increasing FDG avidity.  This could   potentially represent a primary lung cancer or metastatic disease.  Benign   etiologies remain a consideration               ASSESSMENT AND PLAN:  Patrica Girard is a 77 y.o. male with a Cancer Staging  Oropharyngeal cancer ([facility] [address]  Staging form: Pharynx - HPV-Mediated Oropharynx, AJCC 8th Edition  - Clinical stage from 10/12/2021: Stage I (cT2, cN0, cM0) - Signed by Nolvia Singh MD on 10/12/2021  . Patient comes in today for 3 month follow-up after completion of his definitive radiation therapy for his tonsillar cancer. Overall he has recovered well denying any significant long-term symptoms from his radiation treatments for some changes in taste. He had a recent PET scan March 28, 2022 which showed resolution of his tonsillar cancer. He did have a left lower lobe lung nodule which was previously present on his prior PET scan that is still stable in regards to size though showing potentially some FDG activity.   We reviewed the patient the option of short-term follow-up imaging or biopsying the lesion. Patient elected to undergo CT-guided biopsy to what the lesion is. Patient also has a thyroid nodule which was PET avid however he had an ultrasound done in November with recommended follow-up in 1 year. Patient otherwise will be recommended to undergo baseline blood work today including TSH to assess after treatment as he is having fatigue. Patient will follow up after his CT-guided lung biopsy to review results. Patient was in agreement with my recommendations. All questions were answered to their satisfaction. Patient was advised to contact us anytime should they have any questions or concerns. Electronically signed by Uday Lunsford MD on 3/30/2022 at 1:47 PM        Medications Prescribed:   New Prescriptions    No medications on file       Orders:   Orders Placed This Encounter   Procedures    CT NEEDLE BIOPSY LUNG PERCUTANEOUS    TSH With Reflex Ft4    CBC    Comprehensive Metabolic Panel       CC:  Patient Care Team:  Karly Etienne RN as Nurse Navigator (Oncology)     Total time spent on this case on the day of encounter is more than 30 minutes. This time includes combination of one or more of the following - review of necessary tests, review of pertinent medical records from the EMR, performing medically appropriate examination and evaluation, counseling and educating the patient/family/caregiver, ordering necessary medical tests, procedures etc., documenting the clinical information in the electronic medical record, care coordination, referring and communicating with other health care providers and interpretation of results independently. This note is created with the assistance of a speech recognition program.  While intending to generate a document that actually reflects the content of the visit, the document can still have some errors including those of syntax and sound a like substitutions which may escape proof reading.   It such instances, actual meaning can be extrapolated by contextual diversion.

## 2022-04-05 RX ORDER — SODIUM CHLORIDE 9 MG/ML
INJECTION, SOLUTION INTRAVENOUS CONTINUOUS
Status: CANCELLED | OUTPATIENT
Start: 2022-04-05

## 2022-04-06 ENCOUNTER — HOSPITAL ENCOUNTER (OUTPATIENT)
Dept: GENERAL RADIOLOGY | Age: 67
End: 2022-04-06
Payer: MEDICARE

## 2022-04-06 ENCOUNTER — HOSPITAL ENCOUNTER (OUTPATIENT)
Dept: CT IMAGING | Age: 67
Discharge: HOME | End: 2022-04-08
Payer: MEDICARE

## 2022-04-06 ENCOUNTER — HOSPITAL ENCOUNTER (OUTPATIENT)
Dept: GENERAL RADIOLOGY | Age: 67
Discharge: HOME | End: 2022-04-08
Payer: MEDICARE

## 2022-04-06 VITALS
DIASTOLIC BLOOD PRESSURE: 74 MMHG | BODY MASS INDEX: 26.52 KG/M2 | HEIGHT: 68 IN | WEIGHT: 175 LBS | RESPIRATION RATE: 16 BRPM | OXYGEN SATURATION: 99 % | HEART RATE: 77 BPM | TEMPERATURE: 96.8 F | SYSTOLIC BLOOD PRESSURE: 141 MMHG

## 2022-04-06 DIAGNOSIS — R91.1 LEFT LOWER LOBE PULMONARY NODULE: ICD-10-CM

## 2022-04-06 DIAGNOSIS — C10.9 OROPHARYNGEAL CANCER (HCC): ICD-10-CM

## 2022-04-06 LAB
INR BLD: 0.9
PARTIAL THROMBOPLASTIN TIME: 26.8 SEC (ref 23.9–33.8)
PLATELET # BLD: 293 K/UL (ref 138–453)
PROTHROMBIN TIME: 12.1 SEC (ref 11.5–14.2)

## 2022-04-06 PROCEDURE — 7100000010 HC PHASE II RECOVERY - FIRST 15 MIN

## 2022-04-06 PROCEDURE — 88341 IMHCHEM/IMCYTCHM EA ADD ANTB: CPT

## 2022-04-06 PROCEDURE — 2580000003 HC RX 258: Performed by: RADIOLOGY

## 2022-04-06 PROCEDURE — 88333 PATH CONSLTJ SURG CYTO XM 1: CPT

## 2022-04-06 PROCEDURE — 88305 TISSUE EXAM BY PATHOLOGIST: CPT

## 2022-04-06 PROCEDURE — 88334 PATH CONSLTJ SURG CYTO XM EA: CPT

## 2022-04-06 PROCEDURE — 77012 CT SCAN FOR NEEDLE BIOPSY: CPT

## 2022-04-06 PROCEDURE — 85610 PROTHROMBIN TIME: CPT

## 2022-04-06 PROCEDURE — 71045 X-RAY EXAM CHEST 1 VIEW: CPT

## 2022-04-06 PROCEDURE — 36415 COLL VENOUS BLD VENIPUNCTURE: CPT

## 2022-04-06 PROCEDURE — 88342 IMHCHEM/IMCYTCHM 1ST ANTB: CPT

## 2022-04-06 PROCEDURE — 32408 CORE NDL BX LNG/MED PERQ: CPT

## 2022-04-06 PROCEDURE — 85730 THROMBOPLASTIN TIME PARTIAL: CPT

## 2022-04-06 PROCEDURE — 7100000011 HC PHASE II RECOVERY - ADDTL 15 MIN

## 2022-04-06 PROCEDURE — 85049 AUTOMATED PLATELET COUNT: CPT

## 2022-04-06 RX ORDER — SODIUM CHLORIDE 9 MG/ML
INJECTION, SOLUTION INTRAVENOUS CONTINUOUS
Status: DISCONTINUED | OUTPATIENT
Start: 2022-04-06 | End: 2022-04-09 | Stop reason: HOSPADM

## 2022-04-06 RX ORDER — ASPIRIN 81 MG/1
81 TABLET, CHEWABLE ORAL DAILY
COMMUNITY
End: 2022-09-20 | Stop reason: ALTCHOICE

## 2022-04-06 RX ORDER — ACETAMINOPHEN 325 MG/1
650 TABLET ORAL EVERY 4 HOURS PRN
Status: DISCONTINUED | OUTPATIENT
Start: 2022-04-06 | End: 2022-04-09 | Stop reason: HOSPADM

## 2022-04-06 RX ADMIN — SODIUM CHLORIDE: 9 INJECTION, SOLUTION INTRAVENOUS at 09:06

## 2022-04-06 ASSESSMENT — PAIN SCALES - GENERAL
PAINLEVEL_OUTOF10: 0

## 2022-04-06 NOTE — H&P
Interval H&P Note    Pt Name: Kristi Guardado  MRN: [id number]  YOB: 1955  Date of evaluation: 4/6/2022      [x] I have reviewed in [facility] the radiation oncology progress note by Dr. Eddie Sanchez dated 3/29/2022 attached below which meets the criteria for an Interval History and Physical note. [x] I have examined  Kristi Guardado  There are no changes to the patient who is scheduled for a IR CT guided percutaneous needle lung biopsy by Dr. Jessy Fuller for solitary pulmonary nodule and malignant neoplasm of oropharynx. The patient denies new health changes, fever, chills, wheezing, cough, increased SOB, chest pain, open sores or wounds. Denies hx diabetes. Last Multivitamin 4/5/2022 and ASA 81mg 3/30/2022. Vital signs: BP (!) 158/77   Pulse 70   Temp 96.4 °F (35.8 °C) (Temporal)   Resp 18   Ht 5' 8\" (1.727 m)   Wt 175 lb (79.4 kg)   SpO2 99%   BMI 26.61 kg/m²     Allergies:  Seasonal    Medications:    Prior to Admission medications    Medication Sig Start Date End Date Taking? Authorizing Provider   aspirin 81 MG chewable tablet Take 81 mg by mouth daily   Yes Historical Provider, MD   simvastatin (ZOCOR) 20 MG tablet TAKE 1 TABLET BY MOUTH AT  NIGHT 1/19/22   Tawny Patterson MD   hydroCHLOROthiazide (HYDRODIURIL) 12.5 MG tablet TAKE 1 TABLET BY MOUTH  DAILY 11/18/21 2/16/22  Tawny Patterson MD   lisinopril (PRINIVIL;ZESTRIL) 20 MG tablet TAKE 1 TABLET BY MOUTH  DAILY 11/18/21 2/16/22  Tawny Patterson MD   acetaminophen-codeine (Windy Son #3) 300-30 MG per tablet  9/27/21   Historical Provider, MD   omeprazole (PRILOSEC) 20 MG delayed release capsule Take 20 mg by mouth daily    Historical Provider, MD   Multiple Vitamins-Minerals (THERAPEUTIC MULTIVITAMIN-MINERALS) tablet Take 1 tablet by mouth daily    Historical Provider, MD         This is a 77 y.o. male who is pleasant, cooperative, alert and oriented x3, in no acute distress.     Heart: Heart sounds are normal.  HR 70 regular rate and rhythm without murmur, gallop or rub. Lungs: Normal respiratory effort with equal expansion, good air exchange, unlabored and clear to auscultation without wheezes or rales bilaterally   Abdomen: soft, nontender, nondistended with bowel sounds active. Labs:  Recent Labs     22  1414   HGB 15.3   HCT 44.7   WBC 6.2   MCV 87.1         K 4.5      CO2 25   BUN 14   CREATININE 0.85   GLUCOSE 108*   AST 19   ALT 20   LABALBU 4.9       No results for input(s): COVID19 in the last 720 hours. OVIDIO Beltran - CNP  Electronically signed 2022 at 8:58 AM         Rosemarie Andersen MD   Physician   Radiation Oncology   Progress Notes      Signed   Date of Service:  3/29/2022  1:45 PM         Related encounter: Follow Up Appointment from 3/29/2022 in [facility] All Collapse All          Show:Clear all  [x]Manual[x]Template[x]Copied    Added by:  [x]Lizbet Sánchez MD      [][facility] for details        [facility] [address]            Radiation Oncology          [address] [facility] [place] [address]        [place] Asa: [phone]        F: [phone]       [email or web address] [address] [facility]       Radiation Oncology   [address] [facility], [address] [facility]       [place] Asa: [phone]       F: [phone]       [email or web address]       Date of Service: 3/29/2022          Location:  [facility] [place] [id number]                          RADIATION ONCOLOGY FOLLOW UP NOTE     Patient ID:   Daljit Cook  : 1955                                  MRN: [id number]     DIAGNOSIS:  Cancer Staging  Oropharyngeal cancer ([facility] [address]  Staging form: Pharynx - HPV-Mediated Oropharynx, AJCC 8th Edition  - Clinical stage from 10/12/2021: Stage I (cT2, cN0, cM0) - Signed by Tasha Hirsch MD on 10/12/2021     -s/p RT 70Gy 21 INTERVAL HISTORY:   Arianna Diaz is a 77 y.o.. male with a history of a left tonsillar squamous cell carcinoma treated with a course of definitive radiation therapy completing approximately 3 months ago. Patient is here today for routine follow-up visit and exam.  Overall he is recovering well. He notes that he is able to eat a regular diet again and has improvement in his taste. He denies any significant throat pain and is not taking any pain medication. He denies any swelling or trouble with range of motion in his neck. He denies any skin irritation or itching. He denies any other symptoms of headaches, dizziness, chest pain, shortness of breath, abdominal pain, nausea, diarrhea, bony pain, swelling, or bleeding. He does report some fatigue but is working on improving his activity levels. His weight has been stable, however he has not yet followed up with his dentist but plans to. He did see Dr. Kerry Sherman in ENT in February who noted no abnormalities on exam and will be seeing him again in June. Patient had a PET scan done on March 28, 2022 which showed no evidence of primary disease in the tonsil or any lymphadenopathy. There continue to be a small nodule in the thyroid as well as left lower lobe pulmonary nodule as well.         MEDICATIONS:    Current Medication      Current Outpatient Medications:     simvastatin (ZOCOR) 20 MG tablet, TAKE 1 TABLET BY MOUTH AT  NIGHT, Disp: 90 tablet, Rfl: 3    hydroCHLOROthiazide (HYDRODIURIL) 12.5 MG tablet, TAKE 1 TABLET BY MOUTH  DAILY, Disp: 60 tablet, Rfl: 5    lisinopril (PRINIVIL;ZESTRIL) 20 MG tablet, TAKE 1 TABLET BY MOUTH  DAILY, Disp: 60 tablet, Rfl: 5    acetaminophen-codeine (TYLENOL #3) 300-30 MG per tablet, , Disp: , Rfl:     omeprazole (PRILOSEC) 20 MG delayed release capsule, Take 20 mg by mouth daily, Disp: , Rfl:     Multiple Vitamins-Minerals (THERAPEUTIC MULTIVITAMIN-MINERALS) tablet, Take 1 tablet by mouth daily, Disp: , Rfl: ALLERGIES:        Allergies   Allergen Reactions    Seasonal         Runny nose scratchy throat            REVIEW OF SYSTEMS:    A full 14 point review of systems was performed and assessed and found to be negative except as noted above. PHYSICAL EXAMINATION:     CHAPERONE: Family/friend/companieon Present     ECO Asymptomatic     VITAL SIGNS: BP (!) 200/84   Pulse 68   Temp 98.1 °F (36.7 °C)   Resp 18   Wt 174 lb 6.4 oz (79.1 kg)   SpO2 99%   BMI 26.52 kg/m²        Wt Readings from Last 3 Encounters:   22 174 lb 6.4 oz (79.1 kg)   22 176 lb 12.8 oz (80.2 kg)   22 176 lb (79.8 kg)         GENERAL:  General appearance is that of a well-nourished, well-developed in no apparent distress. HEENT: Normocephalic, atraumatic, EOMI, moist mucosa, no erythema. NECK:  No adenopathy or a palpable thyroid mass, trachea is midline. LYMPHATICS: No cervical, supraclavicular adenopathy. HEART:  Normal rate and regular rhythm, normal heart sounds. LUNGS:  Pulmonary effort normal. Normal breath sounds. ABDOMEN:  Soft, nontender, non distended. Right  EXTREMITIES:  No edema. No calf tenderness. MSK:  No spinal tenderness. Normal ROM. NEUROLOGICAL: Alert and oriented. Strength and sensation intact bilaterally. No focal deficits. PSYCH: Mood normal, behavior normal.  SKIN: No erythema, no desquamation. LABS:        WBC   Date Value Ref Range Status   2022 6.2 3.5 - 11.3 k/uL Final            Hemoglobin   Date Value Ref Range Status   2022 15.3 13.0 - 17.0 g/dL Final            Platelets   Date Value Ref Range Status   2022 355 138 - 453 k/uL Final      No results found for: , CEA  No results found for: PSA     IMAGING:   3/28/22 PET Scan  Impression   NIRADS score for Neck Primary: 1: No evidence of recurrence: routine   surveillance, CECT       NIRADS score for Neck Nodes: 1: No evidence of recurrence: routine   surveillance.        There continues to be focal activity in a small nodule in the right thyroid   lobe. Left lower lobe pulmonary nodule with increasing FDG avidity. This could   potentially represent a primary lung cancer or metastatic disease. Benign   etiologies remain a consideration                   ASSESSMENT AND PLAN:  Marciano Bauer is a 77 y.o. male with a Cancer Staging  Oropharyngeal cancer ([facility] [address]  Staging form: Pharynx - HPV-Mediated Oropharynx, AJCC 8th Edition  - Clinical stage from 10/12/2021: Stage I (cT2, cN0, cM0) - Signed by Nadir Valencia MD on 10/12/2021  . Patient comes in today for 3 month follow-up after completion of his definitive radiation therapy for his tonsillar cancer. Overall he has recovered well denying any significant long-term symptoms from his radiation treatments for some changes in taste. He had a recent PET scan March 28, 2022 which showed resolution of his tonsillar cancer. He did have a left lower lobe lung nodule which was previously present on his prior PET scan that is still stable in regards to size though showing potentially some FDG activity. We reviewed the patient the option of short-term follow-up imaging or biopsying the lesion. Patient elected to undergo CT-guided biopsy to what the lesion is. Patient also has a thyroid nodule which was PET avid however he had an ultrasound done in November with recommended follow-up in 1 year. Patient otherwise will be recommended to undergo baseline blood work today including TSH to assess after treatment as he is having fatigue. Patient will follow up after his CT-guided lung biopsy to review results. Patient was in agreement with my recommendations. All questions were answered to their satisfaction. Patient was advised to contact us anytime should they have any questions or concerns.             Electronically signed by Loren Coronel MD on 3/30/2022 at 1:47 PM           Medications Prescribed:       New Prescriptions     No medications on file         Orders:       Orders Placed This Encounter   Procedures    CT NEEDLE BIOPSY LUNG PERCUTANEOUS    TSH With Reflex Ft4    CBC    Comprehensive Metabolic Panel         CC:  Patient Care Team:  Yaw Penaloza RN as Nurse Navigator (Oncology)      Total time spent on this case on the day of encounter is more than 30 minutes. This time includes combination of one or more of the following - review of necessary tests, review of pertinent medical records from the EMR, performing medically appropriate examination and evaluation, counseling and educating the patient/family/caregiver, ordering necessary medical tests, procedures etc., documenting the clinical information in the electronic medical record, care coordination, referring and communicating with other health care providers and interpretation of results independently. This note is created with the assistance of a speech recognition program.  While intending to generate a document that actually reflects the content of the visit, the document can still have some errors including those of syntax and sound a like substitutions which may escape proof reading. It such instances, actual meaning can be extrapolated by contextual diversion.

## 2022-04-06 NOTE — OP NOTE
Brief Postoperative Note    Laxmi Cisse  YOB: 1955  [id number]    Pre-operative Diagnosis: lung nodule    Post-operative Diagnosis: Same    Procedure: CT lung bx    Anesthesia: Local   Surgeons/Assistants: Radha Gray MD     Estimated Blood Loss: minimal    Complications: none immediate    Specimens: were obtained      Electronically signed by Radha Gray MD on 4/6/2022 at 11:12 AM

## 2022-04-07 LAB — SURGICAL PATHOLOGY REPORT: NORMAL

## 2022-04-12 ENCOUNTER — TELEPHONE (OUTPATIENT)
Dept: ONCOLOGY | Age: 67
End: 2022-04-12

## 2022-04-12 ENCOUNTER — OFFICE VISIT (OUTPATIENT)
Dept: ONCOLOGY | Age: 67
End: 2022-04-12
Payer: MEDICARE

## 2022-04-12 ENCOUNTER — HOSPITAL ENCOUNTER (OUTPATIENT)
Dept: RADIATION ONCOLOGY | Age: 67
Discharge: HOME | End: 2022-04-12
Attending: RADIOLOGY
Payer: MEDICARE

## 2022-04-12 VITALS
BODY MASS INDEX: 25.7 KG/M2 | WEIGHT: 169 LBS | SYSTOLIC BLOOD PRESSURE: 169 MMHG | DIASTOLIC BLOOD PRESSURE: 95 MMHG | TEMPERATURE: 98 F | RESPIRATION RATE: 16 BRPM | HEART RATE: 71 BPM | OXYGEN SATURATION: 96 %

## 2022-04-12 VITALS
BODY MASS INDEX: 25.94 KG/M2 | SYSTOLIC BLOOD PRESSURE: 164 MMHG | HEART RATE: 64 BPM | WEIGHT: 170.6 LBS | TEMPERATURE: 98 F | DIASTOLIC BLOOD PRESSURE: 85 MMHG

## 2022-04-12 DIAGNOSIS — C34.32 PRIMARY MALIGNANT NEOPLASM OF LEFT LOWER LOBE OF LUNG (HCC): ICD-10-CM

## 2022-04-12 DIAGNOSIS — C10.9 OROPHARYNGEAL CANCER (HCC): ICD-10-CM

## 2022-04-12 DIAGNOSIS — C34.32 CANCER OF LOWER LOBE OF LEFT LUNG (HCC): Primary | ICD-10-CM

## 2022-04-12 DIAGNOSIS — C34.32 PRIMARY CANCER OF LEFT LOWER LOBE OF LUNG (HCC): Primary | ICD-10-CM

## 2022-04-12 PROCEDURE — G8417 CALC BMI ABV UP PARAM F/U: HCPCS | Performed by: INTERNAL MEDICINE

## 2022-04-12 PROCEDURE — 4040F PNEUMOC VAC/ADMIN/RCVD: CPT | Performed by: INTERNAL MEDICINE

## 2022-04-12 PROCEDURE — 1123F ACP DISCUSS/DSCN MKR DOCD: CPT | Performed by: INTERNAL MEDICINE

## 2022-04-12 PROCEDURE — 99213 OFFICE O/P EST LOW 20 MIN: CPT | Performed by: RADIOLOGY

## 2022-04-12 PROCEDURE — 1036F TOBACCO NON-USER: CPT | Performed by: INTERNAL MEDICINE

## 2022-04-12 PROCEDURE — G8427 DOCREV CUR MEDS BY ELIG CLIN: HCPCS | Performed by: INTERNAL MEDICINE

## 2022-04-12 PROCEDURE — 99211 OFF/OP EST MAY X REQ PHY/QHP: CPT | Performed by: INTERNAL MEDICINE

## 2022-04-12 PROCEDURE — 99211 OFF/OP EST MAY X REQ PHY/QHP: CPT | Performed by: RADIOLOGY

## 2022-04-12 PROCEDURE — 99215 OFFICE O/P EST HI 40 MIN: CPT | Performed by: INTERNAL MEDICINE

## 2022-04-12 PROCEDURE — 3017F COLORECTAL CA SCREEN DOC REV: CPT | Performed by: INTERNAL MEDICINE

## 2022-04-12 NOTE — PROGRESS NOTES
Kristi Guardado  4/12/2022  1:46 PM      Vitals:    04/12/22 1322   BP: (!) 169/95   Pulse: 71   Resp: 16   Temp: 98 °F (36.7 °C)   SpO2: 96%    :  Patient Currently in Pain: No             Wt Readings from Last 1 Encounters:   04/12/22 169 lb (76.7 kg)                Current Outpatient Medications:     aspirin 81 MG chewable tablet, Take 81 mg by mouth daily, Disp: , Rfl:     simvastatin (ZOCOR) 20 MG tablet, TAKE 1 TABLET BY MOUTH AT  NIGHT, Disp: 90 tablet, Rfl: 3    hydroCHLOROthiazide (HYDRODIURIL) 12.5 MG tablet, TAKE 1 TABLET BY MOUTH  DAILY, Disp: 60 tablet, Rfl: 5    lisinopril (PRINIVIL;ZESTRIL) 20 MG tablet, TAKE 1 TABLET BY MOUTH  DAILY, Disp: 60 tablet, Rfl: 5    acetaminophen-codeine (TYLENOL #3) 300-30 MG per tablet, , Disp: , Rfl:     omeprazole (PRILOSEC) 20 MG delayed release capsule, Take 20 mg by mouth daily, Disp: , Rfl:     Multiple Vitamins-Minerals (THERAPEUTIC MULTIVITAMIN-MINERALS) tablet, Take 1 tablet by mouth daily, Disp: , Rfl:                FALLS RISK SCREEN  Instructions:  Assess the patient and enter the appropriate indicators that are present for fall risk identification. Total the numbers entered and assign a fall risk score from Table 2.  Reassess patient at a minimum every 12 weeks or with status change. Assessment   Date  4/12/2022     1. Mental Ability: confusion/cognitively impaired 0     2. Elimination Issues: incontinence, frequency 0       3. Ambulatory: use of assistive devices (walker, cane, off-loading devices),        attached to equipment (IV pole, oxygen) 0     4. Sensory Limitations: dizziness, vertigo, impaired vision 0     5. Age less than 65        0     6. Age 72 or greater 1     7. Medication: diuretics, strong analgesics, hypnotics, sedatives,        antihypertensive agents 3   8. Falls:  recent history of falls within the last 3 months (not to include slipping or        tripping) 0   TOTAL 4    If score of 4 or greater was education given?  No TABLE 2   Risk Score Risk Level Plan of Care   0-3 Little or  No Risk 1. Provide assistance as indicated for ambulation activities  2. Reorient confused/cognitively impaired patient  3. Chair/bed in low position, stretcher/bed with siderails up except when performing patient care activities  5. Educate patient/family/caregiver on falls prevention  6.  Reassess in 12 weeks or with any noted change in patient condition which places them at a risk for a fall   4-6 Moderate Risk 1. Provide assistance as indicated for ambulation activities  2. Reorient confused/cognitively impaired patient  3. Chair/bed in low position, stretcher/bed with siderails up except when performing patient care activities  4. Educate patient/family/caregiver on falls prevention     7 or   Higher High Risk 1. Place patient in easily observable treatment room  2. Patient attended at all times by family member or staff  3. Provide assistance as indicated for ambulation activities  4. Reorient confused/cognitively impaired patient  5. Chair/bed in low position, stretcher/bed with siderails up except when performing patient care activities  6. Educate patient/family/caregiver on falls prevention         PLAN: Patient is seen today in follow up. Here to review recent biopsy results. No new issues per pt. Dr. Joanna Arenas updated and examined pt. Per MD pt will follow up with Thoracic Surgery and will then follow up with our office to finalize treatment plans. Pt on pending.           Clotilda Cabot, RN

## 2022-04-12 NOTE — PROGRESS NOTES
Patient ID: Kevin Gloria, 1955, [id number], 77 y.o. Referred by : No primary care provider on file. Diagnosis:   Oropharyngeal carcin shirley, left tonsil squamous cell carcinoma, p16 positive, clinical stage T2 a N0 M0, stage I disease     Cancer Staging  Oropharyngeal cancer ([facility] [address]  Staging form: Pharynx - HPV-Mediated Oropharynx, AJCC 8th Edition  - Clinical stage from 10/12/2021: Stage I (cT2, cN0, cM0) - Signed by Fahad Anthony MD on 10/12/2021    Patient has been evaluated by ENT surgical oncology at [facility] and as per the tumor board recommendation definitive radiation therapy was recommended   Patient started on definitive radiation therapy on 11/9/2021   He  completed RT on December 29, 2021  3/28/2022 had a PET scan which showed resolution of uptake in the tonsil and no active lymph node in the cervical region however 1.9 cm left lower lobe nodule noted with FDG activity. Patient had CT-guided biopsy on 4/6/2022 which showed adenocarcinoma  Patient scheduled to have PFT and appointment with thoracic surgery  HISTORY OF PRESENT ILLNESS:    Oncologic History:  Kevin Gloria is 77 y.o. male was seen during initial consultation with for newly diagnosed left tonsillar/oropharyngeal carcinoma. Patient initially presented with sore throat/throat pain in May of this year and was referred to ENT for further evaluation. His ENT evaluation did show 3 cm left tonsillar mass limited to tonsils only. Patient had a CT of the neck on 9/22/2021 which showed 3.2 cm mass in the left palatine tonsil without any abnormal lymphadenopathy. Patient was evaluated by ENT and had a biopsy of the left tonsil which showed poorly differentiated invasive squamous cell carcinoma, p16 positive. Subsequently patient had a PET scan on 10/6/2021 which showed no distant metastasis and no lymph node metastasis noted.   Patient has been evaluated by radiation oncology  He denies any unintentional weight loss, drenching night sweats fever chills. INTERVAL HISTORY  Patient is returning for follow-up visit and to discuss PET scan results, biopsy results and further recommendations. His PET scan showed new left lower lobe lung nodule with metabolic activity and now he underwent biopsy on 4/6/2022 which showed primary adenocarcinoma of the lung. She does not have metastatic disease therefore appears early-stage. He is planning to have lung function test and appointment with thoracic surgery soon. He denies any chest pain, shortness of breath. He has mild discomfort at the biopsy site. He denies any trouble swallowing and he is eating much better. During this visit patient's allergy, social, medical, surgical history and medications were reviewed and updated.     Past Medical History:   Diagnosis Date    Cancer ([facility] [address]     Tonsilar cancer    Hyperlipidemia     Hypertension        Past Surgical History:   Procedure Laterality Date    ACHILLES TENDON SURGERY      about 30 years ago     COLONOSCOPY      CT NEEDLE BIOPSY LUNG PERCUTANEOUS  4/6/2022    CT NEEDLE BIOPSY LUNG PERCUTANEOUS 4/6/2022 [facility] CT SCAN       Allergies   Allergen Reactions    Seasonal      Runny nose scratchy throat       Current Outpatient Medications   Medication Sig Dispense Refill    aspirin 81 MG chewable tablet Take 81 mg by mouth daily      simvastatin (ZOCOR) 20 MG tablet TAKE 1 TABLET BY MOUTH AT  NIGHT 90 tablet 3    omeprazole (PRILOSEC) 20 MG delayed release capsule Take 20 mg by mouth daily      Multiple Vitamins-Minerals (THERAPEUTIC MULTIVITAMIN-MINERALS) tablet Take 1 tablet by mouth daily      hydroCHLOROthiazide (HYDRODIURIL) 12.5 MG tablet TAKE 1 TABLET BY MOUTH  DAILY 60 tablet 5    lisinopril (PRINIVIL;ZESTRIL) 20 MG tablet TAKE 1 TABLET BY MOUTH  DAILY 60 tablet 5    acetaminophen-codeine (TYLENOL #3) 300-30 MG per tablet  (Patient not taking: Reported on 12/10/2021)       No current facility-administered medications for this visit. Social History     Socioeconomic History    Marital status:      Spouse name: Not on file    Number of children: Not on file    Years of education: Not on file    Highest education level: Not on file   Occupational History    Not on file   Tobacco Use    Smoking status: Former Smoker     Types: Cigarettes    Smokeless tobacco: Never Used   Vaping Use    Vaping Use: Never used   Substance and Sexual Activity    Alcohol use: Yes     Comment: socially     Drug use: Not Currently    Sexual activity: Yes     Partners: Female   Other Topics Concern    Not on file   Social History Narrative    Not on file     Social Determinants of Health     Financial Resource Strain: Low Risk     Difficulty of Paying Living Expenses: Not hard at all   Food Insecurity: No Food Insecurity    Worried About [employer] in the Last Year: Never true    Judi of Food in the Last Year: Never true   Transportation Needs: No Transportation Needs    Lack of Transportation (Medical): No    Lack of Transportation (Non-Medical):  No   Physical Activity:     Days of Exercise per Week: Not on file    Minutes of Exercise per Session: Not on file   Stress: No Stress Concern Present    Feeling of Stress : Not at all   Social Connections:     Frequency of Communication with Friends and Family: Not on file    Frequency of Social Gatherings with Friends and Family: Not on file    Attends Orthodoxy Services: Not on file    Active Member of Clubs or Organizations: Not on file    Attends Club or Organization Meetings: Not on file    Marital Status: Not on file   Intimate Partner Violence:     Fear of Current or Ex-Partner: Not on file    Emotionally Abused: Not on file    Physically Abused: Not on file    Sexually Abused: Not on file   Housing Stability:     Unable to Pay for Housing in the Last Year: Not on file    Number of [place] in the Last Year: Not on file    Unstable Housing in the Last Year: Not on file       Family History   Problem Relation Age of Onset    Coronary Art Dis Mother     Cancer Father         REVIEW OF SYSTEM:     Constitutional: No fever or chills. No night sweats, no weight loss   Eyes: No eye discharge, double vision, or eye pain   HEENT: negative for sore mouth, sore throat, hoarseness and voice change   Respiratory: negative for cough , sputum, dyspnea, wheezing, hemoptysis, chest pain   Cardiovascular: negative for chest pain, dyspnea, palpitations, orthopnea, PND   Gastrointestinal: negative for nausea, vomiting, diarrhea, constipation, abdominal pain, Dysphagia, hematemesis and hematochezia   Genitourinary: negative for frequency, dysuria, nocturia, urinary incontinence, and hematuria   Integument: negative for rash, skin lesions, bruises.    Hematologic/Lymphatic: negative for easy bruising, bleeding, lymphadenopathy, petechiae and swelling/edema   Endocrine: negative for heat or cold intolerance, tremor, weight changes, change in bowel habits and hair loss   Musculoskeletal: negative for myalgias, arthralgias, pain, joint swelling,and bone pain   Neurological: negative for headaches, dizziness, seizures, weakness, numbness       OBJECTIVE:         Vitals:    04/12/22 1428   BP: (!) 164/85   Pulse: 64   Temp: 98 °F (36.7 °C)       PHYSICAL EXAM:   General appearance - well appearing, no in pain or distress   Mental status - alert and cooperative   Eyes - pupils equal and reactive, extraocular eye movements intact   Ears - bilateral TM's and external ear canals normal   Mouth - mucous membranes moist, pharynx normal without lesions   Neck - supple, no significant adenopathy   Lymphatics - no palpable lymphadenopathy, no hepatosplenomegaly   Chest - clear to auscultation, no wheezes, rales or rhonchi, symmetric air entry   Heart - normal rate, regular rhythm, normal S1, S2, no murmurs, rubs, clicks or gallops   Abdomen - soft, nontender, nondistended, no masses or organomegaly   Neurological - alert, oriented, normal speech, no focal findings or movement disorder noted   Musculoskeletal - no joint tenderness, deformity or swelling   Extremities - peripheral pulses normal, no pedal edema, no clubbing or cyanosis   Skin - normal coloration and turgor, no rashes, no suspicious skin lesions noted ,      LABORATORY DATA:     Lab Results   Component Value Date    WBC 6.2 03/29/2022    HGB 15.3 03/29/2022    HCT 44.7 03/29/2022    MCV 87.1 03/29/2022     04/06/2022    RBC 5.13 03/29/2022    MCH 29.8 03/29/2022    MCHC 34.2 03/29/2022    RDW 12.7 03/29/2022         Chemistry        Component Value Date/Time     03/29/2022 1414    K 4.5 03/29/2022 1414     03/29/2022 1414    CO2 25 03/29/2022 1414    BUN 14 03/29/2022 1414    CREATININE 0.85 03/29/2022 1414        Component Value Date/Time    CALCIUM 10.2 03/29/2022 1414    ALKPHOS 82 03/29/2022 1414    AST 19 03/29/2022 1414    ALT 20 03/29/2022 1414    BILITOT 1.08 03/29/2022 1414        PATHOLOGY DATA:   10/1/2021  8:43 AM - Dangelo, Seren[place] Incoming Lab Results From [employer]    Component Collected Lab   Surgical Pathology Report 09/27/2021 10:13  [address]   -- Diagnosis --   LEFT TONSIL, BIOPSIES:             -  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 Surgical Pathology Report  SURGICAL PATHOLOGY REPORT  Collected: 04/06/22 1230   Lab status: Final   Resulting lab: [email or web address]   Value: -- Diagnosis --   LUNG, LEFT LOWER LOBE, CT-GUIDED CORE NEEDLE BIOPSY:        -  ADENOCARCINOMA, CONSISTENT WITH LUNG PRIMARY. IMAGING DATA:    PET CT SKULL BASE TO MID THIGH  Narrative: EXAMINATION:  WHOLE BODY PET/CT  10/6/2021    TECHNIQUE:  Following IV injection of 11.4 mCi of F 18 -FDG, PET  tumor imaging was  acquired from the base of the skull to the mid thighs.   Computed tomography  was used for purposes of attenuation correction and anatomic localization. Fusion imaging was utilized for interpretation. Uptake time 59 mins. Glucose level 105 mg/dl. COMPARISON:  CT neck 09/22/2021    HISTORY:  Reason for Exam: Malignant neoplasm of tonsillar fossa  Acuity: Acute  Type of Exam: Initial    FINDINGS:  HEAD/NECK: Redemonstration of left palatine tonsillar lesion which is FDG  avid with max SUV of 12.0. No metabolically active cervical lymphadenopathy. 8 mm posterior right thyroid hypodense nodule which is FDG avid with max SUV  of 6.0. CHEST: Left retrocardiac lower lobe nodular area of opacification measuring 2  cm which demonstrates faint uptake with max SUV of 2.3. No metabolically  active axillary, hilar, or mediastinal lymphadenopathy. ABDOMEN/PELVIS: No metabolically active intraperitoneal mass. No  metabolically active abdominal or pelvic lymphadenopathy. Physiologic  activity in the gastrointestinal and genitourinary systems. BONES/SOFT TISSUE: No abnormal FDG activity localizes to the bones. No  aggressive osseous lesion. INCIDENTAL CT FINDINGS: Multivessel coronary artery calcification. Aortic  atherosclerosis most pronounced in the infrarenal segment. Sigmoid  diverticulosis. Impression: 1. Redemonstration of left palatine tonsillar lesion which is FDG avid  consistent with history of malignancy. 2. Posterior right thyroid hypodense nodule which is FDG avid, recommend  further evaluation with dedicated ultrasound. 3. Left retrocardiac lower lobe nodular area of opacification as measured  above which demonstrates faint uptake and is favored to represent possible  pneumonia or other infectious/inflammatory process though  metastatic/neoplastic etiology not definitively excluded. Recommend post  treatment chest CT in 6-8 weeks to reassess. If persistent at time of  follow-up then further evaluation can be obtained with tissue sampling.   PET 3/38/22  NIRADS score for Neck Primary: 1: No evidence of recurrence: routine   surveillance, CECT       NIRADS score for Neck Nodes: 1: No evidence of recurrence: routine   surveillance.       There continues to be focal activity in a small nodule in the right thyroid   lobe.       Left lower lobe pulmonary nodule with increasing FDG avidity.  This could   potentially represent a primary lung cancer or metastatic disease.  Benign   etiologies remain a consideration       ASSESSMENT:    Maximus Kelly is a 77 y.o. pleasant male  diagnosed oropharyngeal cancer, p16 positive, clinical stage T2 a N0 M0, stage I disease. Patient completed definitive radiation therapy and now was on surveillance and recent PET scan showed no evidence of recurrence. I reviewed the NCCN guidelines and goals of care with patient. Left lower lobe lung adenocarcinoma. Appears clinical T1 N0 M0, stage I disease and would be candidate for surgical resection. During today's visit, the patient and the family had a number of reasonable questions which were answered to their satisfaction. They verbalized understanding of the information provided and they agreed to proceed as outlined above. PLAN:   I reviewed the laboratory data, imaging studies, discussed diagnosis, prognosis and treatment recommendations  Follow-up with thoracic surgery for surgical resection for his newly diagnosed lung cancer  Return to clinic 2 to 3 weeks after surgery to discuss surgical pathology results and recommendations about systemic adjuvant treatment      Charles Cook MD  Hematologist/Medical Oncologist    On this date 4/12/22  I have spent 40 minutes reviewing previous notes, test results and face to face with the patient discussing the diagnosis and importance of compliance with the treatment plan. Greater than 50% of that time was spent face-to-face with the patient in counseling and coordinating her care.         This note is created with the assistance of a speech recognition program.  While intending to generate a document that actually reflects the content of the visit, the document can still have some errors including those of syntax and sound a like substitutions which may escape proof reading. It such instances, actual meaning can be extrapolated by contextual diversion. CC    No primary care provider on file.

## 2022-04-12 NOTE — TELEPHONE ENCOUNTER
Name: Celeste Dear  : 1955  MRN: [id number]    Oncology Navigation Follow-Up Note    Contact Type:  Medical Oncology  Notes: Pt @ [facility] for dual RO/MO f/u, recent lung bx pathology discussed with pt & referral placed to Dr. Judit Christopher. Met with pt after f/u's completed. Pt denied questions/concerns. Instructed pt writer will follow closely & may contact writer prn. Will continue to follow.     Electronically signed by Deena Miller RN on 2022 at 3:16 PM

## 2022-04-12 NOTE — TELEPHONE ENCOUNTER
Met with patient after follow up appointment with Dr. Alexey Ulloa. Referral received from Dr. Jay Jaramillo for genetic eval. Discussed that genetic testing is available when concern for hereditary susceptibility to cancer. This can be considered when someone has a strong personal or family history of cancer. While Jocelynn Nagy does have a personal history of two primary cancers; the specific types of cancer he has are rarely hereditary and most often sporadic +/- environmental and lifestyle risk factors. He reports one relative with leukemia and non-melanoma skin cancer. He denies any family history of breast, ovarian, prostate, pancreatic, colorectal or thyroid cancers. At this time, Dr. Alexey Ulloa and I agree that hereditary genetic testing is not indicated. Patient is in agreement and can contact us at anytime with further questions or if changes to his personal or family history.

## 2022-04-13 PROBLEM — C34.32 PRIMARY MALIGNANT NEOPLASM OF LEFT LOWER LOBE OF LUNG (HCC): Status: ACTIVE | Noted: 2022-04-13

## 2022-04-13 NOTE — PROGRESS NOTES
[address]            Radiation Oncology          [address] [facility]          [address]        Alanis Abad: [phone]        F: [phone]       [facility] [address] [place] [address]       Radiation Oncology   [address] [facility], [address] [facility]       Alanis Abad: [phone]       F: [phone]       [email or web address]      Date of Service: 2022     Location:  [address] [place] [address],   [address] [facility]., [address]   [phone]        RADIATION ONCOLOGY FOLLOW UP NOTE    Patient ID:   Paco Blake  : 1955   MRN: [id number]    DIAGNOSIS:  Cancer Staging  Oropharyngeal cancer ([facility] [address]  Staging form: Pharynx - HPV-Mediated Oropharynx, AJCC 8th Edition  - Clinical stage from 10/12/2021: Stage I (cT2, cN0, cM0) - Signed by Noah Mary MD on 10/12/2021    Primary malignant neoplasm of left lower lobe of lung ([facility] [address]  Staging form: Lung, AJCC 8th Edition  - Clinical stage from 2022: Stage IA2 (cT1b, cN0, cM0) - Signed by Amber Apple MD on 2022      -s/p H&N RT 70Gy 21    INTERVAL HISTORY:   Paco Blake is a 77 y.o.. male with a history of a left tonsillar squamous cell carcinoma treated with a course of definitive radiation therapy completing approximately 3 months ago. Patient is here today for short-term follow-up visit after recently having a PET scan that showed a left lower lobe lung lesion. Patient comes in today after having that lesion biopsied which did come back positive for a lung adenocarcinoma on 2022. He denies any symptoms of chest pain, shortness of breath, coughing, or hemoptysis. He is a bit anxious by the new diagnosis given that he just completed treatment for his head and neck cancer but it is agreeable with proceeding with treatment.   He denies any other symptoms of headaches, dizziness, chest pain, shortness of breath, abdominal pain, nausea, diarrhea, bony Strength and sensation intact bilaterally. No focal deficits. PSYCH: Mood normal, behavior normal.  SKIN: No erythema, no desquamation. LABS:  WBC   Date Value Ref Range Status   03/29/2022 6.2 3.5 - 11.3 k/uL Final     Hemoglobin   Date Value Ref Range Status   03/29/2022 15.3 13.0 - 17.0 g/dL Final     Platelets   Date Value Ref Range Status   04/06/2022 293 138 - 453 k/uL Final     No results found for: , CEA  No results found for: PSA    IMAGING:   3/28/22 PET Scan  Impression   NIRADS score for Neck Primary: 1: No evidence of recurrence: routine   surveillance, CECT       NIRADS score for Neck Nodes: 1: No evidence of recurrence: routine   surveillance.       There continues to be focal activity in a small nodule in the right thyroid   lobe.       Left lower lobe pulmonary nodule with increasing FDG avidity.  This could   potentially represent a primary lung cancer or metastatic disease.  Benign   etiologies remain a consideration           PATHOLOGY:  4/6/22 LLL Biopsy  -- Diagnosis --   LUNG, LEFT LOWER LOBE, CT-GUIDED CORE NEEDLE BIOPSY:        -  ADENOCARCINOMA, CONSISTENT WITH LUNG PRIMARY. ASSESSMENT AND PLAN:  Scarlett Sparks is a 77 y.o. male with a Cancer Staging  Oropharyngeal cancer ([facility] [address]  Staging form: Pharynx - HPV-Mediated Oropharynx, AJCC 8th Edition  - Clinical stage from 10/12/2021: Stage I (cT2, cN0, cM0) - Signed by Shreya Echevarria MD on 10/12/2021    Primary malignant neoplasm of left lower lobe of lung ([facility] [address]  Staging form: Lung, AJCC 8th Edition  - Clinical stage from 4/6/2022: Stage IA2 (cT1b, cN0, cM0) - Signed by Zachery Garrett MD on 4/13/2022      Patient comes in today for 3 month follow-up after completion of his definitive radiation therapy for his tonsillar cancer.   Unfortunately patient at the time of PET follow-up to establish resolution of his tonsillar cancer was noted to have a left lower lobe lung nodule which was slightly more avid than prior exam.  His tonsillar cancer in 2000. Other he was referred for a biopsy of the lung nodule. The lung nodule infiltrate come back positive primary adenocarcinoma. Overall patient is doing well without any significant symptoms from the lung cancer or relief from his head and neck primary. However he is nervous about having a new diagnosis. We did discuss the staging as well as guidelines for treatment per the NCCN. I will recommend patient undergo PFT testing and be referred to cardiothoracic surgery for evaluation to discuss options with surgery. Patient will also be referred for genetic counseling testing and evaluation given his 2 separate primary malignancies. We did discuss the role of SBRT treatment if patient is not interested or amendable to surgical resection. We reviewed the logistics of radiation treatment planning, including a CT Simulation session, as well as 4 treatments for approximately 2 weeks. With regards to radiation to the lung, I discussed the possible short-term side effects of skin irritation (causing redness, dryness, or peeling), tiredness, low blood counts (causing infection or bleeding), inflammation of the lungs (causing shortness of breath and cough), trouble/pain with swallowing and hair loss in treated area, or hemoptysis. Possible long-term side effects discussed included hyperpigmentation of the skin, scarring of the lungs (causing shortness of breath and cough), chronic dysphagia, damage to the nerves (causing numbness, weakness, or paralysis) and damage to the heart. Patient was in agreement with my recommendations. He will follow-up after he sees Dr. Angy Moreno to review his treatment options and make a decision. All questions were answered to their satisfaction. Patient was advised to contact us anytime should they have any questions or concerns.          Electronically signed by Katie Troy MD on 4/13/2022 at 12:19 PM        Medications Prescribed:   New Prescriptions    No medications on file       Orders:   Orders Placed This Encounter   Procedures   Lor Montes, [facility], [place], Garcia Kee MD, Cardiothoracic Surgery, [place] Full PFT Study Without Bronchdilator       CC:  Patient Care Team:  Kelly Penaloza MD as PCP - [facility] Empaneled Provider  Hunter Gaona, RN as Nurse Navigator (Oncology)     Total time spent on this case on the day of encounter is more than 30 minutes. This time includes combination of one or more of the following - review of necessary tests, review of pertinent medical records from the EMR, performing medically appropriate examination and evaluation, counseling and educating the patient/family/caregiver, ordering necessary medical tests, procedures etc., documenting the clinical information in the electronic medical record, care coordination, referring and communicating with other health care providers and interpretation of results independently. This note is created with the assistance of a speech recognition program.  While intending to generate a document that actually reflects the content of the visit, the document can still have some errors including those of syntax and sound a like substitutions which may escape proof reading. It such instances, actual meaning can be extrapolated by contextual diversion.

## 2022-04-14 ENCOUNTER — TELEPHONE (OUTPATIENT)
Dept: ONCOLOGY | Age: 67
End: 2022-04-14

## 2022-04-14 NOTE — TELEPHONE ENCOUNTER
Shaw ORDAZ, RN Navigator. Per [facility] request I called Dr. Tristian Barrow office and spoke to Maddi. She states they received the referral and patient to be called today to schedule.

## 2022-04-19 ENCOUNTER — HOSPITAL ENCOUNTER (OUTPATIENT)
Dept: NEUROLOGY | Age: 67
Discharge: HOME | End: 2022-04-19
Payer: MEDICARE

## 2022-04-19 DIAGNOSIS — C34.32 PRIMARY CANCER OF LEFT LOWER LOBE OF LUNG (HCC): ICD-10-CM

## 2022-04-19 PROCEDURE — 94010 BREATHING CAPACITY TEST: CPT

## 2022-04-19 PROCEDURE — 94726 PLETHYSMOGRAPHY LUNG VOLUMES: CPT

## 2022-04-19 PROCEDURE — 94729 DIFFUSING CAPACITY: CPT

## 2022-04-19 RX ORDER — ALBUTEROL SULFATE 90 UG/1
2 AEROSOL, METERED RESPIRATORY (INHALATION) ONCE
Status: DISCONTINUED | OUTPATIENT
Start: 2022-04-19 | End: 2022-04-19

## 2022-04-28 ENCOUNTER — INITIAL CONSULT (OUTPATIENT)
Dept: CARDIOTHORACIC SURGERY | Age: 67
End: 2022-04-28
Payer: MEDICARE

## 2022-04-28 VITALS
DIASTOLIC BLOOD PRESSURE: 78 MMHG | HEIGHT: 68 IN | RESPIRATION RATE: 20 BRPM | OXYGEN SATURATION: 99 % | HEART RATE: 64 BPM | TEMPERATURE: 97.9 F | SYSTOLIC BLOOD PRESSURE: 158 MMHG | BODY MASS INDEX: 25.76 KG/M2 | WEIGHT: 170 LBS

## 2022-04-28 DIAGNOSIS — C34.32 PRIMARY MALIGNANT NEOPLASM OF LEFT LOWER LOBE OF LUNG (HCC): Primary | ICD-10-CM

## 2022-04-28 PROCEDURE — G8417 CALC BMI ABV UP PARAM F/U: HCPCS | Performed by: THORACIC SURGERY (CARDIOTHORACIC VASCULAR SURGERY)

## 2022-04-28 PROCEDURE — 1123F ACP DISCUSS/DSCN MKR DOCD: CPT | Performed by: THORACIC SURGERY (CARDIOTHORACIC VASCULAR SURGERY)

## 2022-04-28 PROCEDURE — 1036F TOBACCO NON-USER: CPT | Performed by: THORACIC SURGERY (CARDIOTHORACIC VASCULAR SURGERY)

## 2022-04-28 PROCEDURE — 4040F PNEUMOC VAC/ADMIN/RCVD: CPT | Performed by: THORACIC SURGERY (CARDIOTHORACIC VASCULAR SURGERY)

## 2022-04-28 PROCEDURE — G8427 DOCREV CUR MEDS BY ELIG CLIN: HCPCS | Performed by: THORACIC SURGERY (CARDIOTHORACIC VASCULAR SURGERY)

## 2022-04-28 PROCEDURE — 99203 OFFICE O/P NEW LOW 30 MIN: CPT | Performed by: THORACIC SURGERY (CARDIOTHORACIC VASCULAR SURGERY)

## 2022-04-28 PROCEDURE — 3017F COLORECTAL CA SCREEN DOC REV: CPT | Performed by: THORACIC SURGERY (CARDIOTHORACIC VASCULAR SURGERY)

## 2022-04-28 NOTE — PROGRESS NOTES
[facility] Cardiothoracic Surgery  Consult    Patient's Name/Date of Birth: Kevin Gloria / 1955 (33 y.o.)    Date: April 28, 2022     Chief Complaint: lung cancer    HPI: Kevin Gloria is a 77 y.o. male with a history of lower lobe lung cancer. He is otherwise in good health with minimal risks for lung cancer.           ROS:   CONSTITUTIONAL:  fever  Respiratory: negative  Cardiovascular: negative  Gastrointestinal: negative  Genitourinary:negative  Hematologic/lymphatic: negative  Musculoskeletal:negative  Neurological: negative  Endocrine: negative  Psychiatric: negative  Past Medical History:   Diagnosis Date    Cancer ([facility] [address]     Tonsilar cancer    Hyperlipidemia     Hypertension      Past Surgical History:   Procedure Laterality Date    ACHILLES TENDON SURGERY      about 30 years ago     COLONOSCOPY      CT NEEDLE BIOPSY LUNG PERCUTANEOUS  4/6/2022    CT NEEDLE BIOPSY LUNG PERCUTANEOUS 4/6/2022 [facility] CT SCAN     Allergies   Allergen Reactions    Seasonal      Runny nose scratchy throat     Family History   Problem Relation Age of Onset    Coronary Art Dis Mother     Cancer Father      Social History     Socioeconomic History    Marital status:      Spouse name: Not on file    Number of children: Not on file    Years of education: Not on file    Highest education level: Not on file   Occupational History    Not on file   Tobacco Use    Smoking status: Former Smoker     Types: Cigarettes    Smokeless tobacco: Never Used   Vaping Use    Vaping Use: Never used   Substance and Sexual Activity    Alcohol use: Yes     Comment: socially     Drug use: Not Currently    Sexual activity: Yes     Partners: Female   Other Topics Concern    Not on file   Social History Narrative    Not on file     Social Determinants of Health     Financial Resource Strain: Low Risk     Difficulty of Paying Living Expenses: Not hard at all   Food Insecurity: No Food Insecurity    Worried About Running Out of Food in the Last Year: Never true    [address] in the Last Year: Never true   Transportation Needs: No Transportation Needs    Lack of Transportation (Medical): No    Lack of Transportation (Non-Medical): No   Physical Activity:     Days of Exercise per Week: Not on file    Minutes of Exercise per Session: Not on file   Stress: No Stress Concern Present    Feeling of Stress : Not at all   Social Connections:     Frequency of Communication with Friends and Family: Not on file    Frequency of Social Gatherings with Friends and Family: Not on file    Attends L[employer] day Services: Not on file    Active Member of [address] [place] [address] or Organizations: Not on file    Attends Club or Organization Meetings: Not on file    Marital Status: Not on file   Intimate Partner Violence:     Fear of Current or Ex-Partner: Not on file    Emotionally Abused: Not on file    Physically Abused: Not on file    Sexually Abused: Not on file   Housing Stability:     Unable to Pay for Housing in the Last Year: Not on file    Number of [place] in the Last Year: Not on file    Unstable Housing in the Last Year: Not on file       Current Outpatient Medications   Medication Sig Dispense Refill    aspirin 81 MG chewable tablet Take 81 mg by mouth daily      simvastatin (ZOCOR) 20 MG tablet TAKE 1 TABLET BY MOUTH AT  NIGHT 90 tablet 3    omeprazole (PRILOSEC) 20 MG delayed release capsule Take 20 mg by mouth daily      Multiple Vitamins-Minerals (THERAPEUTIC MULTIVITAMIN-MINERALS) tablet Take 1 tablet by mouth daily      hydroCHLOROthiazide (HYDRODIURIL) 12.5 MG tablet TAKE 1 TABLET BY MOUTH  DAILY 60 tablet 5    lisinopril (PRINIVIL;ZESTRIL) 20 MG tablet TAKE 1 TABLET BY MOUTH  DAILY 60 tablet 5    acetaminophen-codeine (TYLENOL #3) 300-30 MG per tablet  (Patient not taking: Reported on 12/10/2021)       No current facility-administered medications for this visit.        Physical Exam:  Vitals:    04/28/22 1012   BP: (!) 158/78

## 2022-04-30 NOTE — PROCEDURES
[address] [facility] [address]                [address] [place] [address], [address] [place] [address]                               PULMONARY FUNCTION    PATIENT NAME: Fortunato Gill                     :        1955  MED REC NO:   [id number]                             ROOM:  ACCOUNT NO:   [de-identified]                           ADMIT DATE: 2022  PROVIDER:     Camilla Stephen MD    DATE OF PROCEDURE:  2022    TYPE OF STUDY:  Complete PFT. RESULTS:  The patient had FEV1 of 3.56, which is increased to 112% of  predicted, FVC was 4.01, which is normal at 97% predicted. The FEV1/FVC  was 89. The mid-flows were increased at 201% predicted. Total lung  capacity was normal at 89% predicted. Residual volume was normal at 81%  predicted. Diffusion was normal at 105% predicted. IMPRESSION:  Normal study.         Bruce Moraes MD    D: 2022 20:24:56       T: 2022 20:27:16     [id number]  Job#: [id number]     Doc#: [id number]    CC:

## 2022-05-03 RX ORDER — SODIUM CHLORIDE, SODIUM LACTATE, POTASSIUM CHLORIDE, CALCIUM CHLORIDE 600; 310; 30; 20 MG/100ML; MG/100ML; MG/100ML; MG/100ML
1000 INJECTION, SOLUTION INTRAVENOUS CONTINUOUS
Status: CANCELLED | OUTPATIENT
Start: 2022-05-03

## 2022-05-09 ENCOUNTER — TELEPHONE (OUTPATIENT)
Dept: ONCOLOGY | Age: 67
End: 2022-05-09

## 2022-05-09 ENCOUNTER — HOSPITAL ENCOUNTER (OUTPATIENT)
Dept: PREADMISSION TESTING | Age: 67
Discharge: HOME | End: 2022-05-13
Payer: MEDICARE

## 2022-05-09 VITALS
HEART RATE: 61 BPM | DIASTOLIC BLOOD PRESSURE: 80 MMHG | WEIGHT: 169 LBS | SYSTOLIC BLOOD PRESSURE: 165 MMHG | TEMPERATURE: 96.7 F | HEIGHT: 68 IN | RESPIRATION RATE: 18 BRPM | OXYGEN SATURATION: 97 % | BODY MASS INDEX: 25.61 KG/M2

## 2022-05-09 LAB
ABSOLUTE EOS #: 0.14 K/UL (ref 0–0.44)
ABSOLUTE IMMATURE GRANULOCYTE: <0.03 K/UL (ref 0–0.3)
ABSOLUTE LYMPH #: 0.75 K/UL (ref 1.1–3.7)
ABSOLUTE MONO #: 0.42 K/UL (ref 0.1–1.2)
ALBUMIN SERPL-MCNC: 4.6 G/DL (ref 3.5–5.2)
ALBUMIN/GLOBULIN RATIO: 1.7 (ref 1–2.5)
ALP BLD-CCNC: 85 U/L (ref 40–129)
ALT SERPL-CCNC: 14 U/L (ref 5–41)
ANION GAP SERPL CALCULATED.3IONS-SCNC: 11 MMOL/L (ref 9–17)
AST SERPL-CCNC: 17 U/L
BASOPHILS # BLD: 0 % (ref 0–2)
BASOPHILS ABSOLUTE: <0.03 K/UL (ref 0–0.2)
BILIRUB SERPL-MCNC: 1 MG/DL (ref 0.3–1.2)
BILIRUBIN URINE: NEGATIVE
BUN BLDV-MCNC: 12 MG/DL (ref 8–23)
CALCIUM SERPL-MCNC: 10.1 MG/DL (ref 8.6–10.4)
CHLORIDE BLD-SCNC: 100 MMOL/L (ref 98–107)
CO2: 26 MMOL/L (ref 20–31)
COLOR: YELLOW
COMMENT UA: NORMAL
CREAT SERPL-MCNC: 0.87 MG/DL (ref 0.7–1.2)
EOSINOPHILS RELATIVE PERCENT: 4 % (ref 1–4)
GFR AFRICAN AMERICAN: >60 ML/MIN
GFR NON-AFRICAN AMERICAN: >60 ML/MIN
GFR SERPL CREATININE-BSD FRML MDRD: ABNORMAL ML/MIN/{1.73_M2}
GLUCOSE BLD-MCNC: 105 MG/DL (ref 70–99)
GLUCOSE URINE: NEGATIVE
HCT VFR BLD CALC: 43.1 % (ref 40.7–50.3)
HEMOGLOBIN: 14.7 G/DL (ref 13–17)
IMMATURE GRANULOCYTES: 1 %
KETONES, URINE: NEGATIVE
LEUKOCYTE ESTERASE, URINE: NEGATIVE
LYMPHOCYTES # BLD: 20 % (ref 24–43)
MCH RBC QN AUTO: 30 PG (ref 25.2–33.5)
MCHC RBC AUTO-ENTMCNC: 34.1 G/DL (ref 28.4–34.8)
MCV RBC AUTO: 88 FL (ref 82.6–102.9)
MONOCYTES # BLD: 11 % (ref 3–12)
NITRITE, URINE: NEGATIVE
NRBC AUTOMATED: 0 PER 100 WBC
PDW BLD-RTO: 13 % (ref 11.8–14.4)
PH UA: 7 (ref 5–8)
PLATELET # BLD: 354 K/UL (ref 138–453)
PMV BLD AUTO: 9.4 FL (ref 8.1–13.5)
POTASSIUM SERPL-SCNC: 3.9 MMOL/L (ref 3.7–5.3)
PROTEIN UA: NEGATIVE
RBC # BLD: 4.9 M/UL (ref 4.21–5.77)
SEG NEUTROPHILS: 64 % (ref 36–65)
SEGMENTED NEUTROPHILS ABSOLUTE COUNT: 2.46 K/UL (ref 1.5–8.1)
SODIUM BLD-SCNC: 137 MMOL/L (ref 135–144)
SPECIFIC GRAVITY UA: 1.02 (ref 1–1.03)
TOTAL PROTEIN: 7.3 G/DL (ref 6.4–8.3)
TURBIDITY: CLEAR
URINE HGB: NEGATIVE
UROBILINOGEN, URINE: NORMAL
WBC # BLD: 3.8 K/UL (ref 3.5–11.3)

## 2022-05-09 PROCEDURE — 81003 URINALYSIS AUTO W/O SCOPE: CPT

## 2022-05-09 PROCEDURE — 87086 URINE CULTURE/COLONY COUNT: CPT

## 2022-05-09 PROCEDURE — 36415 COLL VENOUS BLD VENIPUNCTURE: CPT

## 2022-05-09 PROCEDURE — 86920 COMPATIBILITY TEST SPIN: CPT

## 2022-05-09 PROCEDURE — 87641 MR-STAPH DNA AMP PROBE: CPT

## 2022-05-09 PROCEDURE — 86900 BLOOD TYPING SEROLOGIC ABO: CPT

## 2022-05-09 PROCEDURE — 85025 COMPLETE CBC W/AUTO DIFF WBC: CPT

## 2022-05-09 PROCEDURE — 80053 COMPREHEN METABOLIC PANEL: CPT

## 2022-05-09 PROCEDURE — 86901 BLOOD TYPING SEROLOGIC RH(D): CPT

## 2022-05-09 PROCEDURE — 86850 RBC ANTIBODY SCREEN: CPT

## 2022-05-09 PROCEDURE — 93005 ELECTROCARDIOGRAM TRACING: CPT | Performed by: ANESTHESIOLOGY

## 2022-05-09 ASSESSMENT — PAIN DESCRIPTION - PAIN TYPE: TYPE: ACUTE PAIN

## 2022-05-09 ASSESSMENT — PAIN DESCRIPTION - DESCRIPTORS: DESCRIPTORS: SORE

## 2022-05-09 ASSESSMENT — PAIN SCALES - GENERAL: PAINLEVEL_OUTOF10: 2

## 2022-05-09 ASSESSMENT — PAIN DESCRIPTION - LOCATION: LOCATION: THROAT

## 2022-05-09 NOTE — PROGRESS NOTES
Anesthesia Focused Assessment    Hx of anesthesia complications:  no  Family hx of anesthesia complications:  no      Prior + Covid-19 test? no        STOP-BANG Sleep Apnea Questionnaire    SNORE loudly (heard through closed doors)? No  TIRED, fatigued, sleepy during daytime? No  OBSERVED stopping breathing during sleep? No  High blood PRESSURE or being treated? Yes    BMI over 35? No  AGE over 48? Yes  NECK circumference over 16\"? No  GENDER (male)? Yes             Total 3  High risk 5-8  Intermediate risk 3-4  Low risk 0-2    ----------------------------------------------------------------------------------------------------------------------  ITALIA                              No  If yes, machine? DM1                                            No  DM2                   No    Coronary Artery Disease      No  HTN         Yes  Defib/AICD/Pacemaker               No             Renal Failure                   No  If yes, on dialysis           Active smoker? No, former, quit 40 years ago  Drinks alcohol? Yes, occasionally  Illicit drugs? No  Dentition?        benign      Past Medical History:   Diagnosis Date    Cancer ([facility] [address]     Tonsilar cancer. ENT -Dr Dioni Johnson ,   [address] [facility] maintenance     PCP- Tiesha Gómez-  seen  6/2021    Hyperlipidemia     Hypertension     Lung cancer [facility])          Patient was evaluated in [facility] & anesthesia guidelines were applied. NPO guidelines, medication instructions and scheduled arrival time were reviewed with patient. Anesthesia contacted:   No, EKG unchanged, patient denies any cardiopulmonary symptoms.      Medical or cardiac clearance ordered: no Valarie Rubinstein, APRN - CNP   5/9/22  9:50 AM

## 2022-05-09 NOTE — TELEPHONE ENCOUNTER
Name: Norman Wilson  : 1955  MRN: [id number]    Oncology Navigation Follow-Up Note    Contact Type:  Telephone  Notes: Attempted to contact pt for f/u call, no answer, VM left instructed pt may contact writer prn. Will continue to follow. Pt called back, denied questions/concerns. Instructed pt may contact writer prn. Will continue to follow.     Electronically signed by Yousuf Salvador RN on 2022 at 11:35 AM

## 2022-05-10 LAB
CULTURE: NO GROWTH
EKG ATRIAL RATE: 63 BPM
EKG P AXIS: 11 DEGREES
EKG P-R INTERVAL: 172 MS
EKG Q-T INTERVAL: 440 MS
EKG QRS DURATION: 100 MS
EKG QTC CALCULATION (BAZETT): 450 MS
EKG R AXIS: -49 DEGREES
EKG T AXIS: 18 DEGREES
EKG VENTRICULAR RATE: 63 BPM
MRSA, DNA, NASAL: NEGATIVE
SPECIMEN DESCRIPTION: NORMAL
SPECIMEN DESCRIPTION: NORMAL

## 2022-05-22 ENCOUNTER — ANESTHESIA EVENT (OUTPATIENT)
Dept: OPERATING ROOM | Age: 67
DRG: 165 | End: 2022-05-22
Payer: MEDICARE

## 2022-05-22 PROCEDURE — APPNB30 APP NON BILLABLE TIME 0-30 MINS: Performed by: NURSE PRACTITIONER

## 2022-05-22 ASSESSMENT — ENCOUNTER SYMPTOMS
GASTROINTESTINAL NEGATIVE: 1
EYES NEGATIVE: 1
RESPIRATORY NEGATIVE: 1
ALLERGIC/IMMUNOLOGIC NEGATIVE: 1

## 2022-05-22 NOTE — ANESTHESIA PRE PROCEDURE
Department of Anesthesiology  Preprocedure Note       Name:  Michelle Ware   Age:  79 y.o.  :  1955                                          MRN:  [id number]         Date:  2022      Surgeon: Krysten Champion):  Perry Celeste MD    Procedure: Procedure(s):  XI ROBOTIC VIDEO ASSISTED THORACOSCOPY, LOWER LOBECTOMY WITH LYMPHNODE DISSECTION    Medications prior to admission:   Prior to Admission medications    Medication Sig Start Date End Date Taking? Authorizing Provider   aspirin 81 MG chewable tablet Take 81 mg by mouth daily    Historical Provider, MD   simvastatin (ZOCOR) 20 MG tablet TAKE 1 TABLET BY MOUTH AT  NIGHT 22   Tremaine Mcmillan MD   hydroCHLOROthiazide (HYDRODIURIL) 12.5 MG tablet TAKE 1 TABLET BY MOUTH  DAILY 21  Tremaine Mcmillan MD   lisinopril (PRINIVIL;ZESTRIL) 20 MG tablet TAKE 1 TABLET BY MOUTH  DAILY 21  Tremaine Mcmillan MD   acetaminophen-codeine ([place] Room #3) 300-30 MG per tablet  21   Historical Provider, MD   omeprazole (PRILOSEC) 20 MG delayed release capsule Take 20 mg by mouth daily Pt takes 10mg daily    Historical Provider, MD   Multiple Vitamins-Minerals (THERAPEUTIC MULTIVITAMIN-MINERALS) tablet Take 1 tablet by mouth daily    Historical Provider, MD       Current medications:    No current facility-administered medications for this encounter.      Current Outpatient Medications   Medication Sig Dispense Refill    aspirin 81 MG chewable tablet Take 81 mg by mouth daily      simvastatin (ZOCOR) 20 MG tablet TAKE 1 TABLET BY MOUTH AT  NIGHT 90 tablet 3    hydroCHLOROthiazide (HYDRODIURIL) 12.5 MG tablet TAKE 1 TABLET BY MOUTH  DAILY 60 tablet 5    lisinopril (PRINIVIL;ZESTRIL) 20 MG tablet TAKE 1 TABLET BY MOUTH  DAILY 60 tablet 5    acetaminophen-codeine (TYLENOL #3) 300-30 MG per tablet  (Patient not taking: Reported on 12/10/2021)      omeprazole (PRILOSEC) 20 MG delayed release capsule Take 20 mg by mouth daily Pt takes 10mg daily      Multiple Vitamins-Minerals (THERAPEUTIC MULTIVITAMIN-MINERALS) tablet Take 1 tablet by mouth daily         Allergies: Allergies   Allergen Reactions    Seasonal Other (See Comments)     Runny nose scratchy throat       Problem List:    Patient Active Problem List   Diagnosis Code    Hyperlipidemia, mixed E78.2    Essential hypertension I10    Oropharyngeal cancer ([facility] [address] C10.9    Primary malignant neoplasm of left lower lobe of lung (HCC) C34.32       Past Medical History:        Diagnosis Date    Cancer ([address]     Tonsilar cancer. ENT -Dr Alexis Hernandez ,   [facility]     PCP- Orlando Wang-  seen  2021    Hyperlipidemia     Hypertension     Lung cancer [facility])        Past Surgical History:        Procedure Laterality Date    ACHILLES TENDON SURGERY      about 30 years ago     COLONOSCOPY      CT NEEDLE BIOPSY LUNG PERCUTANEOUS  2022    CT NEEDLE BIOPSY LUNG PERCUTANEOUS 2022 [facility] CT SCAN       Social History:    Social History     Tobacco Use    Smoking status: Former Smoker     Types: Cigarettes     Quit date: 3/30/2022     Years since quittin.1    Smokeless tobacco: Never Used   Substance Use Topics    Alcohol use: Yes     Comment: socially                                 Counseling given: Not Answered      Vital Signs (Current): There were no vitals filed for this visit.                                            BP Readings from Last 3 Encounters:   22 (!) 165/80   22 (!) 158/78   22 (!) 169/95       NPO Status:                                                                                 BMI:   Wt Readings from Last 3 Encounters:   22 169 lb (76.7 kg)   22 170 lb (77.1 kg)   22 169 lb (76.7 kg)     There is no height or weight on file to calculate BMI.    CBC:   Lab Results   Component Value Date    WBC 3.8 2022    RBC 4.90 2022    HGB 14.7 2022    HCT 43.1 2022    MCV 88.0 2022    RDW 13.0 05/09/2022     05/09/2022       CMP:   Lab Results   Component Value Date     05/09/2022    K 3.9 05/09/2022     05/09/2022    CO2 26 05/09/2022    BUN 12 05/09/2022    CREATININE 0.87 05/09/2022    GFRAA >60 05/09/2022    LABGLOM >60 05/09/2022    GLUCOSE 105 05/09/2022    PROT 7.3 05/09/2022    CALCIUM 10.1 05/09/2022    BILITOT 1.00 05/09/2022    ALKPHOS 85 05/09/2022    AST 17 05/09/2022    ALT 14 05/09/2022       POC Tests: No results for input(s): POCGLU, POCNA, POCK, POCCL, POCBUN, POCHEMO, POCHCT in the last 72 hours. Coags:   Lab Results   Component Value Date    PROTIME 12.1 04/06/2022    INR 0.9 04/06/2022    APTT 26.8 04/06/2022       HCG (If Applicable): No results found for: PREGTESTUR, PREGSERUM, HCG, HCGQUANT     ABGs: No results found for: PHART, PO2ART, ZMA2AOV, BWQ3RDH, BEART, D5QEQDIZ     Type & Screen (If Applicable):  No results found for: LABABO, LABRH    Drug/Infectious Status (If Applicable):  No results found for: HIV, HEPCAB    COVID-19 Screening (If Applicable): No results found for: COVID19        Anesthesia Evaluation  Patient summary reviewed no history of anesthetic complications:   Airway: Mallampati: II  TM distance: >3 FB   Neck ROM: full  Mouth opening: > = 3 FB   Dental:          Pulmonary:Negative Pulmonary ROS and normal exam                               Cardiovascular:    (+) hypertension: no interval change,       ECG reviewed  Rhythm: regular  Rate: normal                    Neuro/Psych:   Negative Neuro/Psych ROS              GI/Hepatic/Renal: Neg GI/Hepatic/Renal ROS            Endo/Other: Negative Endo/Other ROS                    Abdominal:             Vascular: negative vascular ROS. Other Findings:           Anesthesia Plan      general     ASA 2         arterial line and central line  MIPS: One-lung ventilation. Anesthetic plan and risks discussed with patient.     Use of blood products discussed with patient whom consented to blood products. Plan discussed with CRNA.                   Maria Guadalupe Nam MD   5/22/2022

## 2022-05-23 ENCOUNTER — APPOINTMENT (OUTPATIENT)
Dept: GENERAL RADIOLOGY | Age: 67
DRG: 165 | End: 2022-05-23
Attending: THORACIC SURGERY (CARDIOTHORACIC VASCULAR SURGERY)
Payer: MEDICARE

## 2022-05-23 ENCOUNTER — ANESTHESIA (OUTPATIENT)
Dept: OPERATING ROOM | Age: 67
DRG: 165 | End: 2022-05-23
Payer: MEDICARE

## 2022-05-23 ENCOUNTER — HOSPITAL ENCOUNTER (INPATIENT)
Age: 67
LOS: 4 days | Discharge: HOME | DRG: 165 | End: 2022-05-27
Attending: THORACIC SURGERY (CARDIOTHORACIC VASCULAR SURGERY) | Admitting: THORACIC SURGERY (CARDIOTHORACIC VASCULAR SURGERY)
Payer: MEDICARE

## 2022-05-23 DIAGNOSIS — G89.18 POST-OP PAIN: Primary | ICD-10-CM

## 2022-05-23 PROBLEM — R91.1 LUNG NODULE: Status: ACTIVE | Noted: 2022-05-23

## 2022-05-23 LAB
GLUCOSE BLD-MCNC: 142 MG/DL (ref 74–100)
GLUCOSE BLD-MCNC: 162 MG/DL (ref 74–100)
NEGATIVE BASE EXCESS, ART: 2 (ref 0–2)
NEGATIVE BASE EXCESS, ART: 2 (ref 0–2)
NEGATIVE BASE EXCESS, ART: 4 (ref 0–2)
PATIENT TEMP: 36
POC HCO3: 23.6 MMOL/L (ref 21–28)
POC HCO3: 24.6 MMOL/L (ref 21–28)
POC HCO3: 24.6 MMOL/L (ref 21–28)
POC HEMATOCRIT: 38 % (ref 41–53)
POC HEMATOCRIT: 39 % (ref 41–53)
POC HEMOGLOBIN: 12.8 G/DL (ref 13.5–17.5)
POC HEMOGLOBIN: 13.3 G/DL (ref 13.5–17.5)
POC IONIZED CALCIUM: 1.16 MMOL/L (ref 1.15–1.33)
POC IONIZED CALCIUM: 1.21 MMOL/L (ref 1.15–1.33)
POC O2 SATURATION: 100 % (ref 94–98)
POC O2 SATURATION: 97 % (ref 94–98)
POC O2 SATURATION: 99 % (ref 94–98)
POC PCO2: 43.3 MM HG (ref 35–48)
POC PCO2: 49.6 MM HG (ref 35–48)
POC PCO2: 61.5 MM HG (ref 35–48)
POC PH: 7.21 (ref 7.35–7.45)
POC PH: 7.3 (ref 7.35–7.45)
POC PH: 7.34 (ref 7.35–7.45)
POC PO2: 152.1 MM HG (ref 83–108)
POC PO2: 241.6 MM HG (ref 83–108)
POC PO2: 96 MM HG (ref 83–108)
POC POTASSIUM: 4 MMOL/L (ref 3.5–4.5)
POC POTASSIUM: 4.3 MMOL/L (ref 3.5–4.5)
POC POTASSIUM: 4.5 MMOL/L (ref 3.5–4.5)

## 2022-05-23 PROCEDURE — 32674 THORACOSCOPY LYMPH NODE EXC: CPT | Performed by: THORACIC SURGERY (CARDIOTHORACIC VASCULAR SURGERY)

## 2022-05-23 PROCEDURE — 8E0W4CZ ROBOTIC ASSISTED PROCEDURE OF TRUNK REGION, PERCUTANEOUS ENDOSCOPIC APPROACH: ICD-10-PCS | Performed by: THORACIC SURGERY (CARDIOTHORACIC VASCULAR SURGERY)

## 2022-05-23 PROCEDURE — 2100000001 HC CVICU R&B

## 2022-05-23 PROCEDURE — C1713 ANCHOR/SCREW BN/BN,TIS/BN: HCPCS | Performed by: THORACIC SURGERY (CARDIOTHORACIC VASCULAR SURGERY)

## 2022-05-23 PROCEDURE — 6360000002 HC RX W HCPCS

## 2022-05-23 PROCEDURE — 2720000010 HC SURG SUPPLY STERILE: Performed by: THORACIC SURGERY (CARDIOTHORACIC VASCULAR SURGERY)

## 2022-05-23 PROCEDURE — 82947 ASSAY GLUCOSE BLOOD QUANT: CPT

## 2022-05-23 PROCEDURE — S2900 ROBOTIC SURGICAL SYSTEM: HCPCS | Performed by: THORACIC SURGERY (CARDIOTHORACIC VASCULAR SURGERY)

## 2022-05-23 PROCEDURE — 88331 PATH CONSLTJ SURG 1 BLK 1SPC: CPT

## 2022-05-23 PROCEDURE — 6360000002 HC RX W HCPCS: Performed by: NURSE PRACTITIONER

## 2022-05-23 PROCEDURE — 07B73ZX EXCISION OF THORAX LYMPHATIC, PERCUTANEOUS APPROACH, DIAGNOSTIC: ICD-10-PCS | Performed by: THORACIC SURGERY (CARDIOTHORACIC VASCULAR SURGERY)

## 2022-05-23 PROCEDURE — 71045 X-RAY EXAM CHEST 1 VIEW: CPT

## 2022-05-23 PROCEDURE — 6370000000 HC RX 637 (ALT 250 FOR IP): Performed by: PHYSICIAN ASSISTANT

## 2022-05-23 PROCEDURE — 2500000003 HC RX 250 WO HCPCS

## 2022-05-23 PROCEDURE — 2580000003 HC RX 258: Performed by: THORACIC SURGERY (CARDIOTHORACIC VASCULAR SURGERY)

## 2022-05-23 PROCEDURE — 3600000009 HC SURGERY ROBOT BASE: Performed by: THORACIC SURGERY (CARDIOTHORACIC VASCULAR SURGERY)

## 2022-05-23 PROCEDURE — 2000000000 HC ICU R&B

## 2022-05-23 PROCEDURE — 85014 HEMATOCRIT: CPT

## 2022-05-23 PROCEDURE — 6360000002 HC RX W HCPCS: Performed by: THORACIC SURGERY (CARDIOTHORACIC VASCULAR SURGERY)

## 2022-05-23 PROCEDURE — 6370000000 HC RX 637 (ALT 250 FOR IP): Performed by: NURSE PRACTITIONER

## 2022-05-23 PROCEDURE — 2709999900 HC NON-CHARGEABLE SUPPLY: Performed by: THORACIC SURGERY (CARDIOTHORACIC VASCULAR SURGERY)

## 2022-05-23 PROCEDURE — 32663 THORACOSCOPY W/LOBECTOMY: CPT | Performed by: PHYSICIAN ASSISTANT

## 2022-05-23 PROCEDURE — 6370000000 HC RX 637 (ALT 250 FOR IP)

## 2022-05-23 PROCEDURE — 2500000003 HC RX 250 WO HCPCS: Performed by: THORACIC SURGERY (CARDIOTHORACIC VASCULAR SURGERY)

## 2022-05-23 PROCEDURE — 88309 TISSUE EXAM BY PATHOLOGIST: CPT

## 2022-05-23 PROCEDURE — C1729 CATH, DRAINAGE: HCPCS | Performed by: THORACIC SURGERY (CARDIOTHORACIC VASCULAR SURGERY)

## 2022-05-23 PROCEDURE — 3700000001 HC ADD 15 MINUTES (ANESTHESIA): Performed by: THORACIC SURGERY (CARDIOTHORACIC VASCULAR SURGERY)

## 2022-05-23 PROCEDURE — 7100000001 HC PACU RECOVERY - ADDTL 15 MIN: Performed by: THORACIC SURGERY (CARDIOTHORACIC VASCULAR SURGERY)

## 2022-05-23 PROCEDURE — 32663 THORACOSCOPY W/LOBECTOMY: CPT | Performed by: THORACIC SURGERY (CARDIOTHORACIC VASCULAR SURGERY)

## 2022-05-23 PROCEDURE — 6360000002 HC RX W HCPCS: Performed by: PHYSICIAN ASSISTANT

## 2022-05-23 PROCEDURE — 84132 ASSAY OF SERUM POTASSIUM: CPT

## 2022-05-23 PROCEDURE — 32674 THORACOSCOPY LYMPH NODE EXC: CPT | Performed by: PHYSICIAN ASSISTANT

## 2022-05-23 PROCEDURE — 2780000010 HC IMPLANT OTHER: Performed by: THORACIC SURGERY (CARDIOTHORACIC VASCULAR SURGERY)

## 2022-05-23 PROCEDURE — 3700000000 HC ANESTHESIA ATTENDED CARE: Performed by: THORACIC SURGERY (CARDIOTHORACIC VASCULAR SURGERY)

## 2022-05-23 PROCEDURE — 82803 BLOOD GASES ANY COMBINATION: CPT

## 2022-05-23 PROCEDURE — 2580000003 HC RX 258: Performed by: PHYSICIAN ASSISTANT

## 2022-05-23 PROCEDURE — 2580000003 HC RX 258: Performed by: NURSE PRACTITIONER

## 2022-05-23 PROCEDURE — 6370000000 HC RX 637 (ALT 250 FOR IP): Performed by: THORACIC SURGERY (CARDIOTHORACIC VASCULAR SURGERY)

## 2022-05-23 PROCEDURE — 88313 SPECIAL STAINS GROUP 2: CPT

## 2022-05-23 PROCEDURE — 7100000000 HC PACU RECOVERY - FIRST 15 MIN: Performed by: THORACIC SURGERY (CARDIOTHORACIC VASCULAR SURGERY)

## 2022-05-23 PROCEDURE — 82330 ASSAY OF CALCIUM: CPT

## 2022-05-23 PROCEDURE — 3600000019 HC SURGERY ROBOT ADDTL 15MIN: Performed by: THORACIC SURGERY (CARDIOTHORACIC VASCULAR SURGERY)

## 2022-05-23 PROCEDURE — 0BTJ4ZZ RESECTION OF LEFT LOWER LUNG LOBE, PERCUTANEOUS ENDOSCOPIC APPROACH: ICD-10-PCS | Performed by: THORACIC SURGERY (CARDIOTHORACIC VASCULAR SURGERY)

## 2022-05-23 RX ORDER — VANCOMYCIN HYDROCHLORIDE 1 G/200ML
1000 INJECTION, SOLUTION INTRAVENOUS
Status: COMPLETED | OUTPATIENT
Start: 2022-05-23 | End: 2022-05-23

## 2022-05-23 RX ORDER — MORPHINE SULFATE 2 MG/ML
INJECTION, SOLUTION INTRAMUSCULAR; INTRAVENOUS
Status: COMPLETED
Start: 2022-05-23 | End: 2022-05-23

## 2022-05-23 RX ORDER — LABETALOL HYDROCHLORIDE 5 MG/ML
INJECTION, SOLUTION INTRAVENOUS PRN
Status: DISCONTINUED | OUTPATIENT
Start: 2022-05-23 | End: 2022-05-23 | Stop reason: SDUPTHER

## 2022-05-23 RX ORDER — OXYCODONE HYDROCHLORIDE AND ACETAMINOPHEN 5; 325 MG/1; MG/1
1 TABLET ORAL EVERY 4 HOURS PRN
Status: DISCONTINUED | OUTPATIENT
Start: 2022-05-23 | End: 2022-05-27 | Stop reason: HOSPADM

## 2022-05-23 RX ORDER — MORPHINE SULFATE 2 MG/ML
2 INJECTION, SOLUTION INTRAMUSCULAR; INTRAVENOUS
Status: DISCONTINUED | OUTPATIENT
Start: 2022-05-23 | End: 2022-05-27 | Stop reason: HOSPADM

## 2022-05-23 RX ORDER — MIDAZOLAM HYDROCHLORIDE 2 MG/2ML
1 INJECTION, SOLUTION INTRAMUSCULAR; INTRAVENOUS EVERY 10 MIN PRN
Status: DISCONTINUED | OUTPATIENT
Start: 2022-05-23 | End: 2022-05-23 | Stop reason: HOSPADM

## 2022-05-23 RX ORDER — HYDROCHLOROTHIAZIDE 25 MG/1
12.5 TABLET ORAL DAILY
Status: DISCONTINUED | OUTPATIENT
Start: 2022-05-23 | End: 2022-05-27 | Stop reason: HOSPADM

## 2022-05-23 RX ORDER — CHLORHEXIDINE GLUCONATE 4 G/100ML
SOLUTION TOPICAL SEE ADMIN INSTRUCTIONS
Status: DISCONTINUED | OUTPATIENT
Start: 2022-05-23 | End: 2022-05-23 | Stop reason: HOSPADM

## 2022-05-23 RX ORDER — KETOROLAC TROMETHAMINE 30 MG/ML
INJECTION, SOLUTION INTRAMUSCULAR; INTRAVENOUS PRN
Status: DISCONTINUED | OUTPATIENT
Start: 2022-05-23 | End: 2022-05-23 | Stop reason: SDUPTHER

## 2022-05-23 RX ORDER — SODIUM CHLORIDE 9 MG/ML
INJECTION, SOLUTION INTRAVENOUS CONTINUOUS
Status: DISCONTINUED | OUTPATIENT
Start: 2022-05-23 | End: 2022-05-24

## 2022-05-23 RX ORDER — SODIUM CHLORIDE 9 MG/ML
INJECTION, SOLUTION INTRAVENOUS PRN
Status: DISCONTINUED | OUTPATIENT
Start: 2022-05-23 | End: 2022-05-27 | Stop reason: HOSPADM

## 2022-05-23 RX ORDER — SODIUM CHLORIDE, SODIUM LACTATE, POTASSIUM CHLORIDE, CALCIUM CHLORIDE 600; 310; 30; 20 MG/100ML; MG/100ML; MG/100ML; MG/100ML
1000 INJECTION, SOLUTION INTRAVENOUS CONTINUOUS
Status: DISCONTINUED | OUTPATIENT
Start: 2022-05-23 | End: 2022-05-23 | Stop reason: HOSPADM

## 2022-05-23 RX ORDER — ROCURONIUM BROMIDE 10 MG/ML
INJECTION, SOLUTION INTRAVENOUS PRN
Status: DISCONTINUED | OUTPATIENT
Start: 2022-05-23 | End: 2022-05-23 | Stop reason: SDUPTHER

## 2022-05-23 RX ORDER — MORPHINE SULFATE 4 MG/ML
4 INJECTION, SOLUTION INTRAMUSCULAR; INTRAVENOUS
Status: DISCONTINUED | OUTPATIENT
Start: 2022-05-23 | End: 2022-05-27 | Stop reason: HOSPADM

## 2022-05-23 RX ORDER — SODIUM CHLORIDE, SODIUM LACTATE, POTASSIUM CHLORIDE, CALCIUM CHLORIDE 600; 310; 30; 20 MG/100ML; MG/100ML; MG/100ML; MG/100ML
INJECTION, SOLUTION INTRAVENOUS CONTINUOUS
Status: DISCONTINUED | OUTPATIENT
Start: 2022-05-23 | End: 2022-05-23

## 2022-05-23 RX ORDER — SODIUM CHLORIDE 9 MG/ML
INJECTION, SOLUTION INTRAVENOUS PRN
Status: DISCONTINUED | OUTPATIENT
Start: 2022-05-23 | End: 2022-05-23 | Stop reason: HOSPADM

## 2022-05-23 RX ORDER — ONDANSETRON 2 MG/ML
INJECTION INTRAMUSCULAR; INTRAVENOUS PRN
Status: DISCONTINUED | OUTPATIENT
Start: 2022-05-23 | End: 2022-05-23 | Stop reason: SDUPTHER

## 2022-05-23 RX ORDER — ONDANSETRON 2 MG/ML
4 INJECTION INTRAMUSCULAR; INTRAVENOUS EVERY 6 HOURS PRN
Status: DISCONTINUED | OUTPATIENT
Start: 2022-05-23 | End: 2022-05-27 | Stop reason: HOSPADM

## 2022-05-23 RX ORDER — KETOROLAC TROMETHAMINE 30 MG/ML
30 INJECTION, SOLUTION INTRAMUSCULAR; INTRAVENOUS ONCE
Status: COMPLETED | OUTPATIENT
Start: 2022-05-23 | End: 2022-05-23

## 2022-05-23 RX ORDER — CEFAZOLIN SODIUM 1 G/3ML
INJECTION, POWDER, FOR SOLUTION INTRAMUSCULAR; INTRAVENOUS PRN
Status: DISCONTINUED | OUTPATIENT
Start: 2022-05-23 | End: 2022-05-23 | Stop reason: SDUPTHER

## 2022-05-23 RX ORDER — LIDOCAINE HYDROCHLORIDE 10 MG/ML
INJECTION, SOLUTION EPIDURAL; INFILTRATION; INTRACAUDAL; PERINEURAL PRN
Status: DISCONTINUED | OUTPATIENT
Start: 2022-05-23 | End: 2022-05-23 | Stop reason: SDUPTHER

## 2022-05-23 RX ORDER — MAGNESIUM HYDROXIDE 1200 MG/15ML
LIQUID ORAL CONTINUOUS PRN
Status: DISCONTINUED | OUTPATIENT
Start: 2022-05-23 | End: 2022-05-23 | Stop reason: HOSPADM

## 2022-05-23 RX ORDER — PROPOFOL 10 MG/ML
INJECTION, EMULSION INTRAVENOUS PRN
Status: DISCONTINUED | OUTPATIENT
Start: 2022-05-23 | End: 2022-05-23 | Stop reason: SDUPTHER

## 2022-05-23 RX ORDER — SODIUM CHLORIDE 0.9 % (FLUSH) 0.9 %
5-40 SYRINGE (ML) INJECTION PRN
Status: DISCONTINUED | OUTPATIENT
Start: 2022-05-23 | End: 2022-05-27 | Stop reason: HOSPADM

## 2022-05-23 RX ORDER — ALBUTEROL SULFATE 90 UG/1
AEROSOL, METERED RESPIRATORY (INHALATION) PRN
Status: DISCONTINUED | OUTPATIENT
Start: 2022-05-23 | End: 2022-05-23 | Stop reason: SDUPTHER

## 2022-05-23 RX ORDER — LISINOPRIL 20 MG/1
20 TABLET ORAL DAILY
Status: DISCONTINUED | OUTPATIENT
Start: 2022-05-23 | End: 2022-05-26

## 2022-05-23 RX ORDER — SODIUM CHLORIDE 0.9 % (FLUSH) 0.9 %
5-40 SYRINGE (ML) INJECTION EVERY 12 HOURS SCHEDULED
Status: DISCONTINUED | OUTPATIENT
Start: 2022-05-23 | End: 2022-05-23 | Stop reason: HOSPADM

## 2022-05-23 RX ORDER — FENTANYL CITRATE 50 UG/ML
INJECTION, SOLUTION INTRAMUSCULAR; INTRAVENOUS PRN
Status: DISCONTINUED | OUTPATIENT
Start: 2022-05-23 | End: 2022-05-23 | Stop reason: SDUPTHER

## 2022-05-23 RX ORDER — NEOSTIGMINE METHYLSULFATE 5 MG/5 ML
SYRINGE (ML) INTRAVENOUS PRN
Status: DISCONTINUED | OUTPATIENT
Start: 2022-05-23 | End: 2022-05-23 | Stop reason: SDUPTHER

## 2022-05-23 RX ORDER — SODIUM CHLORIDE 0.9 % (FLUSH) 0.9 %
5-40 SYRINGE (ML) INJECTION PRN
Status: DISCONTINUED | OUTPATIENT
Start: 2022-05-23 | End: 2022-05-23 | Stop reason: HOSPADM

## 2022-05-23 RX ORDER — LIDOCAINE HYDROCHLORIDE 10 MG/ML
1 INJECTION, SOLUTION EPIDURAL; INFILTRATION; INTRACAUDAL; PERINEURAL
Status: DISCONTINUED | OUTPATIENT
Start: 2022-05-23 | End: 2022-05-23 | Stop reason: HOSPADM

## 2022-05-23 RX ORDER — SODIUM CHLORIDE 0.9 % (FLUSH) 0.9 %
5-40 SYRINGE (ML) INJECTION EVERY 12 HOURS SCHEDULED
Status: DISCONTINUED | OUTPATIENT
Start: 2022-05-23 | End: 2022-05-27 | Stop reason: HOSPADM

## 2022-05-23 RX ORDER — SODIUM CHLORIDE 0.9 % (FLUSH) 0.9 %
10 SYRINGE (ML) INJECTION PRN
Status: DISCONTINUED | OUTPATIENT
Start: 2022-05-23 | End: 2022-05-23

## 2022-05-23 RX ORDER — CHLORHEXIDINE GLUCONATE 0.12 MG/ML
15 RINSE ORAL ONCE
Status: COMPLETED | OUTPATIENT
Start: 2022-05-23 | End: 2022-05-23

## 2022-05-23 RX ORDER — GLYCOPYRROLATE 0.2 MG/ML
INJECTION INTRAMUSCULAR; INTRAVENOUS PRN
Status: DISCONTINUED | OUTPATIENT
Start: 2022-05-23 | End: 2022-05-23 | Stop reason: SDUPTHER

## 2022-05-23 RX ORDER — BUPIVACAINE HYDROCHLORIDE AND EPINEPHRINE 5; 5 MG/ML; UG/ML
INJECTION, SOLUTION PERINEURAL PRN
Status: DISCONTINUED | OUTPATIENT
Start: 2022-05-23 | End: 2022-05-23 | Stop reason: HOSPADM

## 2022-05-23 RX ORDER — SODIUM CHLORIDE 0.9 % (FLUSH) 0.9 %
5-40 SYRINGE (ML) INJECTION EVERY 12 HOURS SCHEDULED
Status: DISCONTINUED | OUTPATIENT
Start: 2022-05-23 | End: 2022-05-23

## 2022-05-23 RX ORDER — SODIUM CHLORIDE 9 MG/ML
INJECTION, SOLUTION INTRAVENOUS PRN
Status: DISCONTINUED | OUTPATIENT
Start: 2022-05-23 | End: 2022-05-23

## 2022-05-23 RX ORDER — SODIUM PHOSPHATE, DIBASIC AND SODIUM PHOSPHATE, MONOBASIC 7; 19 G/133ML; G/133ML
1 ENEMA RECTAL DAILY PRN
Status: DISCONTINUED | OUTPATIENT
Start: 2022-05-23 | End: 2022-05-27 | Stop reason: HOSPADM

## 2022-05-23 RX ORDER — MIDAZOLAM HYDROCHLORIDE 1 MG/ML
INJECTION INTRAMUSCULAR; INTRAVENOUS PRN
Status: DISCONTINUED | OUTPATIENT
Start: 2022-05-23 | End: 2022-05-23 | Stop reason: SDUPTHER

## 2022-05-23 RX ORDER — DEXAMETHASONE SODIUM PHOSPHATE 10 MG/ML
INJECTION INTRAMUSCULAR; INTRAVENOUS PRN
Status: DISCONTINUED | OUTPATIENT
Start: 2022-05-23 | End: 2022-05-23 | Stop reason: SDUPTHER

## 2022-05-23 RX ORDER — FAMOTIDINE 20 MG/1
20 TABLET, FILM COATED ORAL 2 TIMES DAILY
Status: DISCONTINUED | OUTPATIENT
Start: 2022-05-23 | End: 2022-05-27 | Stop reason: HOSPADM

## 2022-05-23 RX ORDER — OXYCODONE HYDROCHLORIDE AND ACETAMINOPHEN 5; 325 MG/1; MG/1
2 TABLET ORAL EVERY 4 HOURS PRN
Status: DISCONTINUED | OUTPATIENT
Start: 2022-05-23 | End: 2022-05-27 | Stop reason: HOSPADM

## 2022-05-23 RX ADMIN — FENTANYL CITRATE 50 MCG: 50 INJECTION, SOLUTION INTRAMUSCULAR; INTRAVENOUS at 12:13

## 2022-05-23 RX ADMIN — FENTANYL CITRATE 50 MCG: 50 INJECTION, SOLUTION INTRAMUSCULAR; INTRAVENOUS at 08:48

## 2022-05-23 RX ADMIN — FENTANYL CITRATE 50 MCG: 50 INJECTION, SOLUTION INTRAMUSCULAR; INTRAVENOUS at 11:49

## 2022-05-23 RX ADMIN — CEFAZOLIN 2000 MG: 330 INJECTION, POWDER, FOR SOLUTION INTRAMUSCULAR; INTRAVENOUS at 12:36

## 2022-05-23 RX ADMIN — VANCOMYCIN HYDROCHLORIDE 1000 MG: 1 INJECTION, SOLUTION INTRAVENOUS at 08:15

## 2022-05-23 RX ADMIN — LISINOPRIL 20 MG: 20 TABLET ORAL at 15:51

## 2022-05-23 RX ADMIN — ALBUTEROL SULFATE 4 PUFF: 90 AEROSOL, METERED RESPIRATORY (INHALATION) at 08:30

## 2022-05-23 RX ADMIN — PHENYLEPHRINE HYDROCHLORIDE 100 MCG: 10 INJECTION INTRAVENOUS at 11:30

## 2022-05-23 RX ADMIN — SODIUM CHLORIDE: 9 INJECTION, SOLUTION INTRAVENOUS at 13:54

## 2022-05-23 RX ADMIN — FAMOTIDINE 20 MG: 20 TABLET, FILM COATED ORAL at 15:52

## 2022-05-23 RX ADMIN — SODIUM CHLORIDE, PRESERVATIVE FREE 10 ML: 5 INJECTION INTRAVENOUS at 20:08

## 2022-05-23 RX ADMIN — KETOROLAC TROMETHAMINE 30 MG: 30 INJECTION, SOLUTION INTRAMUSCULAR at 18:25

## 2022-05-23 RX ADMIN — PHENYLEPHRINE HYDROCHLORIDE 100 MCG: 10 INJECTION INTRAVENOUS at 11:41

## 2022-05-23 RX ADMIN — MORPHINE SULFATE 2 MG: 2 INJECTION, SOLUTION INTRAMUSCULAR; INTRAVENOUS at 17:37

## 2022-05-23 RX ADMIN — PHENYLEPHRINE HYDROCHLORIDE 150 MCG: 10 INJECTION INTRAVENOUS at 10:54

## 2022-05-23 RX ADMIN — FENTANYL CITRATE 100 MCG: 50 INJECTION, SOLUTION INTRAMUSCULAR; INTRAVENOUS at 12:45

## 2022-05-23 RX ADMIN — CHLORHEXIDINE GLUCONATE 15 ML: 1.2 SOLUTION ORAL at 06:32

## 2022-05-23 RX ADMIN — PHENYLEPHRINE HYDROCHLORIDE 100 MCG: 10 INJECTION INTRAVENOUS at 11:43

## 2022-05-23 RX ADMIN — ROCURONIUM BROMIDE 50 MG: 10 INJECTION INTRAVENOUS at 08:41

## 2022-05-23 RX ADMIN — KETOROLAC TROMETHAMINE 30 MG: 30 INJECTION, SOLUTION INTRAMUSCULAR at 12:06

## 2022-05-23 RX ADMIN — FENTANYL CITRATE 50 MCG: 50 INJECTION, SOLUTION INTRAMUSCULAR; INTRAVENOUS at 09:05

## 2022-05-23 RX ADMIN — PROPOFOL 150 MG: 10 INJECTION, EMULSION INTRAVENOUS at 07:50

## 2022-05-23 RX ADMIN — ROCURONIUM BROMIDE 50 MG: 10 INJECTION INTRAVENOUS at 09:46

## 2022-05-23 RX ADMIN — Medication: at 06:33

## 2022-05-23 RX ADMIN — SODIUM CHLORIDE: 9 INJECTION, SOLUTION INTRAVENOUS at 21:58

## 2022-05-23 RX ADMIN — ONDANSETRON 4 MG: 2 INJECTION INTRAMUSCULAR; INTRAVENOUS at 11:59

## 2022-05-23 RX ADMIN — MORPHINE SULFATE 2 MG: 2 INJECTION, SOLUTION INTRAMUSCULAR; INTRAVENOUS at 14:00

## 2022-05-23 RX ADMIN — FENTANYL CITRATE 50 MCG: 50 INJECTION, SOLUTION INTRAMUSCULAR; INTRAVENOUS at 08:45

## 2022-05-23 RX ADMIN — LIDOCAINE HYDROCHLORIDE 50 MG: 10 INJECTION, SOLUTION EPIDURAL; INFILTRATION; INTRACAUDAL; PERINEURAL at 07:50

## 2022-05-23 RX ADMIN — GLYCOPYRROLATE 0.8 MG: 0.2 INJECTION INTRAMUSCULAR; INTRAVENOUS at 12:10

## 2022-05-23 RX ADMIN — MORPHINE SULFATE 4 MG: 4 INJECTION INTRAVENOUS at 22:05

## 2022-05-23 RX ADMIN — PHENYLEPHRINE HYDROCHLORIDE 100 MCG: 10 INJECTION INTRAVENOUS at 11:03

## 2022-05-23 RX ADMIN — ROCURONIUM BROMIDE 50 MG: 10 INJECTION INTRAVENOUS at 08:49

## 2022-05-23 RX ADMIN — SODIUM CHLORIDE, POTASSIUM CHLORIDE, SODIUM LACTATE AND CALCIUM CHLORIDE: 600; 310; 30; 20 INJECTION, SOLUTION INTRAVENOUS at 06:28

## 2022-05-23 RX ADMIN — PHENYLEPHRINE HYDROCHLORIDE 100 MCG: 10 INJECTION INTRAVENOUS at 11:40

## 2022-05-23 RX ADMIN — FAMOTIDINE 20 MG: 20 TABLET, FILM COATED ORAL at 20:09

## 2022-05-23 RX ADMIN — FENTANYL CITRATE 100 MCG: 50 INJECTION, SOLUTION INTRAMUSCULAR; INTRAVENOUS at 07:50

## 2022-05-23 RX ADMIN — MIDAZOLAM HYDROCHLORIDE 2 MG: 1 INJECTION, SOLUTION INTRAMUSCULAR; INTRAVENOUS at 07:41

## 2022-05-23 RX ADMIN — Medication 2000 MG: at 08:41

## 2022-05-23 RX ADMIN — OXYCODONE HYDROCHLORIDE AND ACETAMINOPHEN 2 TABLET: 5; 325 TABLET ORAL at 14:27

## 2022-05-23 RX ADMIN — ROCURONIUM BROMIDE 20 MG: 10 INJECTION INTRAVENOUS at 11:25

## 2022-05-23 RX ADMIN — HYDROCHLOROTHIAZIDE 12.5 MG: 25 TABLET ORAL at 15:52

## 2022-05-23 RX ADMIN — Medication 2.5 MG: at 09:13

## 2022-05-23 RX ADMIN — BISACODYL 5 MG: 5 TABLET, COATED ORAL at 15:52

## 2022-05-23 RX ADMIN — PROPOFOL 50 MG: 10 INJECTION, EMULSION INTRAVENOUS at 08:49

## 2022-05-23 RX ADMIN — DEXAMETHASONE SODIUM PHOSPHATE 10 MG: 10 INJECTION INTRAMUSCULAR; INTRAVENOUS at 08:35

## 2022-05-23 RX ADMIN — FENTANYL CITRATE 50 MCG: 50 INJECTION, SOLUTION INTRAMUSCULAR; INTRAVENOUS at 08:41

## 2022-05-23 RX ADMIN — Medication 4 MG: at 12:10

## 2022-05-23 RX ADMIN — MUPIROCIN: 20 OINTMENT TOPICAL at 06:33

## 2022-05-23 RX ADMIN — ROCURONIUM BROMIDE 50 MG: 10 INJECTION INTRAVENOUS at 07:50

## 2022-05-23 ASSESSMENT — PAIN SCALES - GENERAL
PAINLEVEL_OUTOF10: 8
PAINLEVEL_OUTOF10: 8
PAINLEVEL_OUTOF10: 9
PAINLEVEL_OUTOF10: 0

## 2022-05-23 ASSESSMENT — PAIN DESCRIPTION - LOCATION
LOCATION: CHEST;INCISION
LOCATION: RIB CAGE
LOCATION: CHEST;INCISION

## 2022-05-23 ASSESSMENT — PAIN DESCRIPTION - ORIENTATION
ORIENTATION: LEFT

## 2022-05-23 ASSESSMENT — PAIN DESCRIPTION - DESCRIPTORS: DESCRIPTORS: CRAMPING

## 2022-05-23 ASSESSMENT — PAIN - FUNCTIONAL ASSESSMENT: PAIN_FUNCTIONAL_ASSESSMENT: 0-10

## 2022-05-23 NOTE — CARE COORDINATION
Case Management Initial Discharge Plan  Teresa Hastings,             Met with:patient to discuss discharge plans. Information verified: address, contacts, phone number, , insurance Yes  Insurance Provider: [facility] Medicare    Emergency Contact/Next of Kin name & number: Maximiliano Uribe [phone]  Who are involved in patient's support system? wife    PCP: Sanjeev Anaya MD  Date of last visit: year ago      Discharge Planning    Living Arrangements:  Spouse/Significant Other     Home has 2 stories  2 stairs to climb to get into front door, 1 flight stairs to climb to reach second floor  Location of bedroom/bathroom in home second floor, 1/2 bath on first floor    Patient able to perform ADL's:Independent    Current Services (outpatient & in home) none  DME equipment: none  DME provider:      Is patient receiving oral anticoagulation therapy? No    If indicated:   Physician managing anticoagulation treatment:    Where does patient obtain lab work for ATC treatment? Does patient have any issues/concerns obtaining medications? No  If yes, what are patient's concerns? Is there a preferred Pharmacy after hours or on weekends? Yes    If yes, which pharmacy? [facility] in [place]    Potential Assistance Needed:  N/A    Patient agreeable to home care: No  Sigel of choice provided:  n/a    Prior SNF/Rehab Placement and Facility:    Agreeable to SNF/Rehab: No  Sigel of choice provided: n/a     Evaluation: no    Expected Discharge date:       Patient expects to be discharged to: If home: is the family and/or caregiver wiling & able to provide support at home? yes  Who will be providing this support?  wife    Follow Up Appointment: Best Day/ Time:      Transportation provider: wife  Transportation arrangements needed for discharge: No     Readmission Risk              Risk of Unplanned Readmission:  6             Does patient have a readmission risk score greater than 14?: No  If yes, follow-up appointment must be made within 7 days of discharge.      Goals of Care:       Educated patient on transitional options, provided freedom of choice and are agreeable with plan      Discharge Plan: home with wife, has a ride, declines [facility].          Electronically signed by Vivian Mcdonnell RN on 5/23/22 at 5:32 PM EDT

## 2022-05-23 NOTE — ANESTHESIA PROCEDURE NOTES
Central Venous Line:    A central venous line was placed using surface landmarks, in the OR for the following indication(s): central venous access and CVP monitoring. Sterility preparation included the following: hand hygiene performed prior to procedure, maximum sterile barriers used and sterile technique used to drape from head to toe. The patient was placed in Trendelenburg position. The left subclavian vein was prepped. The site was prepped with Chloraprep.double lumen was placed. During the procedure, the following specific steps were taken: target vein identified, needle advanced into vein and blood aspirated and guidewire advanced into vein. Intravenous verification was obtained by venous blood return. Post insertion care included: all ports aspirated, all ports flushed easily, guidewire removed intact, Biopatch applied, line sutured in place and dressing applied. During the procedure the patient experienced: patient tolerated procedure well with no complications.     Outcomes: patient tolerated procedure well and uncomplicated  Anesthesia type: general  Staffing  Anesthesiologist: Nery Campuzano MD  Preanesthetic Checklist  Completed: patient identified, timeout performed and patient being monitored

## 2022-05-23 NOTE — ANESTHESIA PROCEDURE NOTES
Arterial Line:    An arterial line was placed using surface landmarks, in the OR for the following indication(s): continuous blood pressure monitoring and blood sampling needed. A 20 gauge (size), 1 and 3/4 inch (length), Arrow (type) catheter was placed, Seldinger technique used, into the right radial artery, secured by Tegaderm. Anesthesia type: General    Events:  patient tolerated procedure well with no complications and EBL < [id number].8/[id number]/7170 7:50 AM5/23/2022 8:00 AM  Anesthesiologist: Stephanie Peres MD  Other anesthesia staff: Nickolas Brooks RN  Performed:  Other anesthesia staff   Preanesthetic Checklist  Completed: patient identified, IV checked, site marked, risks and benefits discussed, surgical consent, monitors and equipment checked, pre-op evaluation, timeout performed, anesthesia consent given, oxygen available and patient being monitored

## 2022-05-23 NOTE — ANESTHESIA POSTPROCEDURE EVALUATION
Department of Anesthesiology  Postprocedure Note    Patient: Lidia Young  MRN: [id number]  YOB: 1955  Date of evaluation: 5/23/2022  Time:  7:05 PM     Procedure Summary     Date: 05/23/22 Room / Location: [address]    Anesthesia Start: 0745 Anesthesia Stop: 0629    Procedure: XI ROBOTIC VIDEO ASSISTED THORACOSCOPY, LOWER LOBECTOMY (Left Chest) Diagnosis: (PRIMARY CANCER OF LEFT LOWER LOBE)    Surgeons: Ron Duarte MD Responsible Provider: Jonel Cade MD    Anesthesia Type: general ASA Status: 2          Anesthesia Type: No value filed. Ivory Phase I: Ivory Score: 8    Ivory Phase II:      Last vitals: Reviewed and per EMR flowsheets.        Anesthesia Post Evaluation    Patient location during evaluation: ICU  Patient participation: complete - patient participated  Level of consciousness: awake and alert  Pain score: 4  Airway patency: patent  Nausea & Vomiting: no nausea and no vomiting  Complications: no  Cardiovascular status: hemodynamically stable  Respiratory status: acceptable  Hydration status: stable

## 2022-05-23 NOTE — PROGRESS NOTES
[facility] Cardiothoracic Surgery  Pre-op Note    5/23/2022    Norah Sender is scheduled for surgery today. All of the pertinent studies have been reviewed and patient is NPO. I have discussed the procedure with the patient and family, and they have been given opportunity to ask questions. The attendant risks, benefits, and possible outcomes have been discussed with them. They understand and have signed informed consent.       Navneet Hendrix MD

## 2022-05-23 NOTE — PROGRESS NOTES
[facility] Cardiothoracic Surgery  Pre-op Note    5/23/2022    Maggy Solis is scheduled for surgery today. All of the pertinent studies have been reviewed and patient is NPO. I have discussed the procedure with the patient and family, and they have been given opportunity to ask questions. The attendant risks, benefits, and possible outcomes have been discussed with them. They understand and have signed informed consent. High risk for airway complications. All services aware and patient is in agreement.     Jennyfer Faustin MD

## 2022-05-23 NOTE — PLAN OF CARE
Problem: Discharge Planning  Goal: Discharge to home or other facility with appropriate resources  5/23/2022 1949 by Brent Ballard RN  Outcome: Progressing  5/23/2022 1520 by Tristen Flores RN  Outcome: Progressing     Problem: Pain  Goal: Verbalizes/displays adequate comfort level or baseline comfort level  5/23/2022 1949 by Brent Ballard RN  Outcome: Progressing  5/23/2022 1520 by Tristen Flores RN  Outcome: Progressing     Problem: Safety - Adult  Goal: Free from fall injury  5/23/2022 1949 by Brent Ballard RN  Outcome: Progressing  5/23/2022 1520 by Tristen Flores RN  Outcome: Progressing     Problem: ABCDS Injury Assessment  Goal: Absence of physical injury  5/23/2022 1949 by Brent Ballard RN  Outcome: Progressing  5/23/2022 1520 by Tristen Flores RN  Outcome: Progressing

## 2022-05-23 NOTE — H&P
[id number] [facility] Cardiothoracic Surgery  History andPhysical    Patient's Name/Date of Birth: Maggy Solis / 1955 (89 y.o.)    Date: May 22, 2022     Chief Complaint:     HPI: Maggy Solis is a 79 y.o. Left lower lobectomy Via VATS after squamous cell carcinoma diagnosed. hematoloy oncolot following  No known Hx of lung cancer in family   No known risk for heart disease in family or patient  ROS:  Review of Systems   Constitutional: Negative. HENT: Negative. Eyes: Negative. Respiratory: Negative. Cardiovascular: Negative. Gastrointestinal: Negative. Endocrine: Negative. Genitourinary: Negative. Musculoskeletal: Negative. Allergic/Immunologic: Negative. Neurological: Negative. Past Medical History:   Diagnosis Date    Cancer [facility])     Tonsilar cancer. ENT -Dr Sandi Herrera ,   [facility]     PCP- Deirdre Babb-  seen  2021    Hyperlipidemia     Hypertension     Lung cancer [facility])      Past Surgical History:   Procedure Laterality Date    ACHILLES TENDON SURGERY      about 30 years ago     COLONOSCOPY      CT NEEDLE BIOPSY LUNG PERCUTANEOUS  2022    CT NEEDLE BIOPSY LUNG PERCUTANEOUS 2022 [facility] CT SCAN     Allergies   Allergen Reactions    Seasonal Other (See Comments)     Runny nose scratchy throat     Family History   Problem Relation Age of Onset    Coronary Art Dis Mother     Cancer Father      Social History     Socioeconomic History    Marital status:      Spouse name: Not on file    Number of children: Not on file    Years of education: Not on file    Highest education level: Not on file   Occupational History    Not on file   Tobacco Use    Smoking status: Former Smoker     Types: Cigarettes     Quit date: 3/30/2022     Years since quittin.1    Smokeless tobacco: Never Used   Vaping Use    Vaping Use: Never used   Substance and Sexual Activity    Alcohol use: Yes     Comment: socially     Drug use: Not Currently    Sexual activity: Yes     Partners: Female   Other Topics Concern    Not on file   Social History Narrative    Not on file     Social Determinants of Health     Financial Resource Strain:     Difficulty of Paying Living Expenses: Not on file   Food Insecurity:     Worried About [address] in the Last Year: Not on file    Judi of Food in the Last Year: Not on file   Transportation Needs:     Lack of Transportation (Medical): Not on file    Lack of Transportation (Non-Medical): Not on file   Physical Activity:     Days of Exercise per Week: Not on file    Minutes of Exercise per Session: Not on file   Stress:     Feeling of Stress : Not on file   Social Connections:     Frequency of Communication with Friends and Family: Not on file    Frequency of Social Gatherings with Friends and Family: Not on file    Attends [employer] Services: Not on file    Active Member of [address] [place] [address] or Organizations: Not on file    Attends Club or Organization Meetings: Not on file    Marital Status: Not on file   Intimate Partner Violence:     Fear of Current or Ex-Partner: Not on file    Emotionally Abused: Not on file    Physically Abused: Not on file    Sexually Abused: Not on file   Housing Stability:     Unable to Pay for Housing in the Last Year: Not on file    Number of [place] in the Last Year: Not on file    Unstable Housing in the Last Year: Not on file       No current facility-administered medications for this encounter.      Current Outpatient Medications   Medication Sig Dispense Refill    aspirin 81 MG chewable tablet Take 81 mg by mouth daily      simvastatin (ZOCOR) 20 MG tablet TAKE 1 TABLET BY MOUTH AT  NIGHT 90 tablet 3    hydroCHLOROthiazide (HYDRODIURIL) 12.5 MG tablet TAKE 1 TABLET BY MOUTH  DAILY 60 tablet 5    lisinopril (PRINIVIL;ZESTRIL) 20 MG tablet TAKE 1 TABLET BY MOUTH  DAILY 60 tablet 5    acetaminophen-codeine (TYLENOL #3) 300-30 MG per tablet  (Patient not taking: Reported on 12/10/2021)  omeprazole (PRILOSEC) 20 MG delayed release capsule Take 20 mg by mouth daily Pt takes 10mg daily      Multiple Vitamins-Minerals (THERAPEUTIC MULTIVITAMIN-MINERALS) tablet Take 1 tablet by mouth daily         Physical Exam:  There were no vitals filed for this visit. Weight:           No intake/output data recorded. Physical Exam    Data:    CBC: No results for input(s): WBC, HGB, HCT, MCV, PLT in the last 72 hours. BMP: No results for input(s): NA, K, CL, CO2, PHOS, BUN, CREATININE, CA, MG in the last 72 hours. Accucheck Glucoses:   No results for input(s): POCGLU in the last 72 hours. Cardiac Enzymes: No results for input(s): CKTOTAL, CKMB, CKMBINDEX, TROPONINI in the last 72 hours. PTT/PT/INR:  No results for input(s): PROTIME, INR in the last 72 hours. No results for input(s): APTT in the last 72 hours.   Liver Profile:  Lab Results   Component Value Date    AST 17 05/09/2022    ALT 14 05/09/2022    BILITOT 1.00 05/09/2022    ALKPHOS 85 05/09/2022   No results found for: CHOL, HDL, TRIG  TSH:   Lab Results   Component Value Date    TSH 1.07 03/29/2022     UA:  Lab Results   Component Value Date    COLORU Yellow 05/09/2022    PHUR 7.0 05/09/2022    SPECGRAV 1.017 05/09/2022    LEUKOCYTESUR NEGATIVE 05/09/2022    UROBILINOGEN Normal 05/09/2022    BILIRUBINUR NEGATIVE 05/09/2022    GLUCOSEU NEGATIVE 05/09/2022         Assessment & Plan:  Patient Active Problem List   Diagnosis    Hyperlipidemia, mixed    Essential hypertension    [facility] [address] [address]    Primary malignant neoplasm of left lower lobe of lung ([facility] [address]     PLAN:  OR for left lower lobectomy by VATS  Informed consent in media/chart  No AC or AP medications         DEYANIRA BE, APRN - NP, APRN CNP

## 2022-05-24 ENCOUNTER — APPOINTMENT (OUTPATIENT)
Dept: GENERAL RADIOLOGY | Age: 67
DRG: 165 | End: 2022-05-24
Attending: THORACIC SURGERY (CARDIOTHORACIC VASCULAR SURGERY)
Payer: MEDICARE

## 2022-05-24 LAB
ABO/RH: NORMAL
ABSOLUTE EOS #: 0 K/UL (ref 0–0.44)
ABSOLUTE IMMATURE GRANULOCYTE: 0 K/UL (ref 0–0.3)
ABSOLUTE LYMPH #: 0.52 K/UL (ref 1.1–3.7)
ABSOLUTE MONO #: 0.93 K/UL (ref 0.1–1.2)
ANION GAP SERPL CALCULATED.3IONS-SCNC: 10 MMOL/L (ref 9–17)
ANTIBODY SCREEN: NEGATIVE
ARM BAND NUMBER: NORMAL
BASOPHILS # BLD: 0 % (ref 0–2)
BASOPHILS ABSOLUTE: 0 K/UL (ref 0–0.2)
BLD PROD TYP BPU: NORMAL
BLD PROD TYP BPU: NORMAL
BPU ID: NORMAL
BPU ID: NORMAL
BUN BLDV-MCNC: 16 MG/DL (ref 8–23)
CALCIUM SERPL-MCNC: 8.8 MG/DL (ref 8.6–10.4)
CHLORIDE BLD-SCNC: 103 MMOL/L (ref 98–107)
CO2: 24 MMOL/L (ref 20–31)
CREAT SERPL-MCNC: 0.72 MG/DL (ref 0.7–1.2)
CROSSMATCH RESULT: NORMAL
CROSSMATCH RESULT: NORMAL
DISPENSE STATUS BLOOD BANK: NORMAL
DISPENSE STATUS BLOOD BANK: NORMAL
EOSINOPHILS RELATIVE PERCENT: 0 % (ref 1–4)
EXPIRATION DATE: NORMAL
GFR AFRICAN AMERICAN: >60 ML/MIN
GFR NON-AFRICAN AMERICAN: >60 ML/MIN
GFR SERPL CREATININE-BSD FRML MDRD: ABNORMAL ML/MIN/{1.73_M2}
GLUCOSE BLD-MCNC: 126 MG/DL (ref 70–99)
HCT VFR BLD CALC: 38.7 % (ref 40.7–50.3)
HEMOGLOBIN: 13.2 G/DL (ref 13–17)
IMMATURE GRANULOCYTES: 0 %
LYMPHOCYTES # BLD: 5 % (ref 24–43)
MCH RBC QN AUTO: 30.3 PG (ref 25.2–33.5)
MCHC RBC AUTO-ENTMCNC: 34.1 G/DL (ref 28.4–34.8)
MCV RBC AUTO: 89 FL (ref 82.6–102.9)
MONOCYTES # BLD: 9 % (ref 3–12)
MORPHOLOGY: NORMAL
NRBC AUTOMATED: 0 PER 100 WBC
PDW BLD-RTO: 13.1 % (ref 11.8–14.4)
PLATELET # BLD: 284 K/UL (ref 138–453)
PMV BLD AUTO: 9.7 FL (ref 8.1–13.5)
POTASSIUM SERPL-SCNC: 4.7 MMOL/L (ref 3.7–5.3)
RBC # BLD: 4.35 M/UL (ref 4.21–5.77)
SEG NEUTROPHILS: 86 % (ref 36–65)
SEGMENTED NEUTROPHILS ABSOLUTE COUNT: 8.85 K/UL (ref 1.5–8.1)
SODIUM BLD-SCNC: 137 MMOL/L (ref 135–144)
TRANSFUSION STATUS: NORMAL
TRANSFUSION STATUS: NORMAL
UNIT DIVISION: 0
UNIT DIVISION: 0
WBC # BLD: 10.3 K/UL (ref 3.5–11.3)

## 2022-05-24 PROCEDURE — 2140000001 HC CVICU INTERMEDIATE R&B

## 2022-05-24 PROCEDURE — 97166 OT EVAL MOD COMPLEX 45 MIN: CPT

## 2022-05-24 PROCEDURE — 97161 PT EVAL LOW COMPLEX 20 MIN: CPT

## 2022-05-24 PROCEDURE — 94664 DEMO&/EVAL PT USE INHALER: CPT

## 2022-05-24 PROCEDURE — 94669 MECHANICAL CHEST WALL OSCILL: CPT

## 2022-05-24 PROCEDURE — 6370000000 HC RX 637 (ALT 250 FOR IP): Performed by: THORACIC SURGERY (CARDIOTHORACIC VASCULAR SURGERY)

## 2022-05-24 PROCEDURE — 2580000003 HC RX 258: Performed by: PHYSICIAN ASSISTANT

## 2022-05-24 PROCEDURE — 6370000000 HC RX 637 (ALT 250 FOR IP): Performed by: PHYSICIAN ASSISTANT

## 2022-05-24 PROCEDURE — 2700000000 HC OXYGEN THERAPY PER DAY

## 2022-05-24 PROCEDURE — 80048 BASIC METABOLIC PNL TOTAL CA: CPT

## 2022-05-24 PROCEDURE — 85025 COMPLETE CBC W/AUTO DIFF WBC: CPT

## 2022-05-24 PROCEDURE — 36415 COLL VENOUS BLD VENIPUNCTURE: CPT

## 2022-05-24 PROCEDURE — 97116 GAIT TRAINING THERAPY: CPT

## 2022-05-24 PROCEDURE — 94150 VITAL CAPACITY TEST: CPT

## 2022-05-24 PROCEDURE — 71045 X-RAY EXAM CHEST 1 VIEW: CPT

## 2022-05-24 PROCEDURE — 6360000002 HC RX W HCPCS: Performed by: PHYSICIAN ASSISTANT

## 2022-05-24 PROCEDURE — 99024 POSTOP FOLLOW-UP VISIT: CPT | Performed by: NURSE PRACTITIONER

## 2022-05-24 PROCEDURE — 94761 N-INVAS EAR/PLS OXIMETRY MLT: CPT

## 2022-05-24 PROCEDURE — 97535 SELF CARE MNGMENT TRAINING: CPT

## 2022-05-24 RX ORDER — FAMOTIDINE 20 MG/1
20 TABLET, FILM COATED ORAL 2 TIMES DAILY
Qty: 60 TABLET | Refills: 3 | Status: SHIPPED | OUTPATIENT
Start: 2022-05-24 | End: 2022-11-04

## 2022-05-24 RX ORDER — LIDOCAINE 4 G/G
1 PATCH TOPICAL DAILY
Status: DISCONTINUED | OUTPATIENT
Start: 2022-05-24 | End: 2022-05-27 | Stop reason: HOSPADM

## 2022-05-24 RX ORDER — LIDOCAINE 4 G/G
1 PATCH TOPICAL DAILY
Qty: 15 PATCH | Refills: 0 | Status: SHIPPED | OUTPATIENT
Start: 2022-05-24 | End: 2022-11-04

## 2022-05-24 RX ORDER — OXYCODONE HYDROCHLORIDE AND ACETAMINOPHEN 5; 325 MG/1; MG/1
1 TABLET ORAL EVERY 8 HOURS PRN
Qty: 21 TABLET | Refills: 0 | Status: SHIPPED | OUTPATIENT
Start: 2022-05-24 | End: 2022-05-31

## 2022-05-24 RX ADMIN — FAMOTIDINE 20 MG: 20 TABLET, FILM COATED ORAL at 08:20

## 2022-05-24 RX ADMIN — BISACODYL 5 MG: 5 TABLET, COATED ORAL at 08:20

## 2022-05-24 RX ADMIN — HYDROCHLOROTHIAZIDE 12.5 MG: 25 TABLET ORAL at 08:20

## 2022-05-24 RX ADMIN — SODIUM CHLORIDE, PRESERVATIVE FREE 10 ML: 5 INJECTION INTRAVENOUS at 21:48

## 2022-05-24 RX ADMIN — SODIUM CHLORIDE: 9 INJECTION, SOLUTION INTRAVENOUS at 05:14

## 2022-05-24 RX ADMIN — LISINOPRIL 20 MG: 20 TABLET ORAL at 08:20

## 2022-05-24 RX ADMIN — OXYCODONE HYDROCHLORIDE AND ACETAMINOPHEN 2 TABLET: 5; 325 TABLET ORAL at 14:33

## 2022-05-24 RX ADMIN — OXYCODONE HYDROCHLORIDE AND ACETAMINOPHEN 2 TABLET: 5; 325 TABLET ORAL at 07:32

## 2022-05-24 RX ADMIN — MORPHINE SULFATE 2 MG: 2 INJECTION, SOLUTION INTRAMUSCULAR; INTRAVENOUS at 01:47

## 2022-05-24 RX ADMIN — MORPHINE SULFATE 4 MG: 4 INJECTION INTRAVENOUS at 11:11

## 2022-05-24 RX ADMIN — FAMOTIDINE 20 MG: 20 TABLET, FILM COATED ORAL at 21:45

## 2022-05-24 RX ADMIN — OXYCODONE HYDROCHLORIDE AND ACETAMINOPHEN 2 TABLET: 5; 325 TABLET ORAL at 21:44

## 2022-05-24 RX ADMIN — MORPHINE SULFATE 2 MG: 2 INJECTION, SOLUTION INTRAMUSCULAR; INTRAVENOUS at 06:22

## 2022-05-24 RX ADMIN — SODIUM CHLORIDE, PRESERVATIVE FREE 10 ML: 5 INJECTION INTRAVENOUS at 08:20

## 2022-05-24 ASSESSMENT — PAIN DESCRIPTION - ORIENTATION
ORIENTATION: LEFT

## 2022-05-24 ASSESSMENT — PAIN SCALES - GENERAL
PAINLEVEL_OUTOF10: 8
PAINLEVEL_OUTOF10: 4
PAINLEVEL_OUTOF10: 7
PAINLEVEL_OUTOF10: 8
PAINLEVEL_OUTOF10: 8
PAINLEVEL_OUTOF10: 2
PAINLEVEL_OUTOF10: 8
PAINLEVEL_OUTOF10: 8
PAINLEVEL_OUTOF10: 3
PAINLEVEL_OUTOF10: 0

## 2022-05-24 ASSESSMENT — PAIN DESCRIPTION - LOCATION
LOCATION: CHEST
LOCATION: RIB CAGE
LOCATION: CHEST

## 2022-05-24 ASSESSMENT — PAIN DESCRIPTION - DESCRIPTORS
DESCRIPTORS: ACHING;SORE;TIGHTNESS
DESCRIPTORS: ACHING;SHARP
DESCRIPTORS: ACHING
DESCRIPTORS: ACHING;SHARP;SORE
DESCRIPTORS: ACHING
DESCRIPTORS: ACHING;SORE;TIGHTNESS

## 2022-05-24 ASSESSMENT — PAIN DESCRIPTION - PAIN TYPE
TYPE: SURGICAL PAIN
TYPE: SURGICAL PAIN

## 2022-05-24 ASSESSMENT — PAIN DESCRIPTION - FREQUENCY: FREQUENCY: CONTINUOUS

## 2022-05-24 ASSESSMENT — PAIN DESCRIPTION - ONSET
ONSET: ON-GOING
ONSET: ON-GOING

## 2022-05-24 ASSESSMENT — PAIN SCALES - WONG BAKER: WONGBAKER_NUMERICALRESPONSE: 0

## 2022-05-24 NOTE — PLAN OF CARE
Reviewed CXR-no appreciated pneumothorax.  Remove chest tubes please followed by another CXR this afternoon at 3pm

## 2022-05-24 NOTE — OP NOTE
[facility]                                OPERATIVE REPORT    PATIENT NAME: Aakash Doe                     :        1955  MED REC NO:   [id number]                             ROOM:       1005  ACCOUNT NO:   [de-identified]                           ADMIT DATE: 2022  PROVIDER:     Ventura Barrientos MD    DATE OF PROCEDURE:  2022    PREOPERATIVE DIAGNOSIS:  Left lower lobe lung cancer. POSTOPERATIVE DIAGNOSIS:  Left lower lobe lung cancer. PROCEDURES:  1.  Robotic left lower lobe lobectomy. 2.  Lymph node dissection. 3.  Platelet gel. SURGEON:  Ventura Barrientos MD    ASSISTANT:  Neida Camejo PA-C    EBL:  100 mL. SPECIMEN:  1. Left lower lobe. 2.  Lymph nodes. DRAINS:  Two 28-Maltese chest tubes. PROCEDURE:  The patient had the risks, benefits, and intentions fully  discussed. Risks include but not limited to bleeding, infection, damage  to the heart or lungs, anesthesia risks. He understands the risks and  signs consent. He was taken back to the operating room and placed in  supine position. Double-lumen endotracheal tube, A-line, Beatty, and IVs  were placed. At this time, he was placed in the right lateral decubitus  position. All the appropriate padding and precautions were taken. The  patient was washed, prepped and draped in normal sterile fashion. Time-out was performed. At this time, Thoracoports were placed in the  following manner. 5 mL of Marcaine was injected to the eighth  intercostal space midaxillary line. This 8-mm incision was made top of  the eighth rib and continued in depth with electrocautery. The thorax  was entered using an 8-mm Thoracoport. A 5-mm camera was placed. Next,  the posterior two Thoracoports were placed in the same intercostal  space. A 12-mm port was placed posterior one handbreadth to the 8-mm  port.   The next Thoracoport was placed after anesthetizing anterior to  the spine using an 8-mm port. An anterior 12-mm port was placed. Next,  an assistant port was placed in the 10th intercostal space. This is a  12-mm port. With all the ports in place, the robot was brought forth  and docked to the robotic ports. The 0-degree scope was placed and  targeting was performed at the fissure between the upper and lower  lobes. At this time, the remaining arms were docked. The patient had the  graspers placed, tip-up fenestrated and Cadiere through the anterior and  posterior most Thoracoports. The third port was used with [employer]. The assistant port was also used by Gaurav Cowan for  assistance. At this time, lymph nodes are identified. There was no #9,  no #7 or #5 or #6 lymph nodes identified. #10 lymph node was sent with  the specimen as was the #11 lymph node. With all the lymph nodes being  negative at this time, the patient had the fissure developed and the  fissure was stapled using a green 45 stapler. The pulmonary artery was  dissected free circumferentially. Red vessel loop was passed underneath  the pulmonary artery which was elevated. Pulmonary artery was snared  using the vessel loop and elevated. An Ethicon 35 vascular stapler was  placed around the pulmonary artery and clamped. The stapler was fired  and then the pulmonary vein was dissected free. In similar fashion, it  was elevated using vessel loop. The pulmonary vein was stapled using a  35 mm stapler. Bronchus was cleaned of fibrofatty tissue. The bronchus  was stapled using a green 45 stapler. The remaining of the posterior  fissure was also stapled using an Ethicon stapler. The specimen was  then placed in an EndoCatch bag. The assistant port was enlarged and  the specimen was removed. Specimen was negative at the margins and the  two 28-Bahraini chest tubes were placed through the anterior most  Thoracoport sites.     The chest tube was sutured in place using 0 silk sutures. The lung was  re-expanded. The patient had the remaining Thoracoport sites closed  using two 3-0 and 4-0 Vicryl sutures. The patient was extubated in the  operating room. It should be noted that platelet gel was applied to the  cut surfaces of the lung as well as the incisions in the chest.  4x4s  and tape were applied. The patient was taken in critical condition to  the intensive care unit. This procedure could not have been performed without the assistance of  Alonso Hdez who was invaluable assisting at the chest and also  stapling structures.         Contreras Bone MD    D: 05/23/2022 16:05:46       T: 05/23/2022 16:09:15     [id number]  Job#: [id number]     Doc#: [id number]    CC:

## 2022-05-24 NOTE — PROGRESS NOTES
Occupational Therapy  Facility/Department: [facility] CAR 1  Occupational Therapy Initial Assessment    Name: Tiera Delcid  : 1955  MRN: [id number]  Date of Service: 2022    Discharge Recommendations:  Patient would benefit from continued therapy after discharge        Patient Diagnosis(es): The encounter diagnosis was Post-op pain. Past Medical History:  has a past medical history of Cancer [facility]), Health care maintenance, Hyperlipidemia, Hypertension, and Lung cancer ([facility] [address] Past Surgical History:  has a past surgical history that includes Achilles tendon surgery; Colonoscopy; CT NEEDLE BIOPSY LUNG PERCUTANEOUS (2022); lobectomy (Left, 2022); lymph node dissection (Left, 2022); and Lung removal, total (Left, 2022). Assessment   Performance deficits / Impairments: Decreased safe awareness;Decreased endurance;Decreased functional mobility ; Decreased ADL status  Assessment: Pt limited by deficits listed above most signficantly, decreased activity tolerance and decreased safety awareness. Pt to benefit from continued therapy services while hospitalized to maximize pt's safety and independence in performing functional tasks. Pt expected to be safe to return home at discharge with supportive wife able to assist as needed. Prognosis: Good  Decision Making: Medium Complexity  REQUIRES OT FOLLOW-UP: Yes  Activity Tolerance  Activity Tolerance: Patient Tolerated treatment well     Education Given To: Patient; Family (Pt wife present)  Education Provided: Role of Therapy;Plan of Care;Equipment (Pt educated on safety and lines/tubes)  Education Method: Verbal  Barriers to Learning: None  Education Outcome: Verbalized understanding;Continued education needed    Safety Devices  Type of Devices: Call light within reach; Left in chair;Gait belt;Nurse notified  Restraints  Restraints Initially in Place: No    Plan   Plan  Times per Week: 3-5x/wk  Times per Day: Daily  Current Treatment Recommendations: Strengthening,Functional mobility training,Endurance training,Safety education & training,Pain management,Patient/Caregiver education & training     Restrictions  Position Activity Restriction  Other position/activity restrictions: Up to chair, ambulate pt. Pt with 2L O2 via nasal cannula, L sided chest tube. Subjective   General  Patient assessed for rehabilitation services?: Yes  Family / Caregiver Present: Yes (wife)  General Comment  Comments: Nuse ok'd for therapy this AM. Pt agreeable to participate and cooporated throughout. Pt reports 8/10 pain at chest tube site.          Social/Functional History  Social/Functional History  Lives With: Spouse  Type of Home: House  Home Layout: Two level,Bed/Bath upstairs,1/2 bath on main level  Home Access: Stairs to enter without rails  Entrance Stairs - Number of Steps: 2  Bathroom Shower/Tub: Tub/Shower unit (No grab bars)  Bathroom Toilet: Standard (No grab bars)  Receives Help From: Family (Pt wife able to help as necessary, wife is retired as well)  ADL Assistance: Independent  Homemaking Assistance: Independent  Homemaking Responsibilities: Yes (shares responsibilities with wife)  Ambulation Assistance: Independent  Transfer Assistance: Independent  Active : Yes  Mode of Transportation: Car  Occupation: Retired  Type of Occupation:   Leisure & Hobbies: Watching golf, watching hockey, grilling/cooking, yardwork      Objective  Vision Exceptions: Wears glasses for reading  Hearing: Within functional limits         Balance  Sitting: Intact (sitting at edge of recliner ~10 minutes independently)  Standing: Stand by assistance (pt demo ~8-10 minutes standing tolerance during functional mobility within hospital room/hallway, toileting tasks, and grooming tasks while standing sink side; pt with quick pace and decreased awareness of need for safety throughout requiring min VCs)    Functional Mobility  Overall Level of Assistance: Stand-by assistance  Interventions: Safety awareness training;Verbal cues       AROM: Within functional limits (BUE)  Strength: Within functional limits (BUE grossly 4+/5)  Coordination: Within functional limits (BUE)  Tone: Normal (BUE)  Sensation: Intact     ADL  Feeding: Independent  Grooming: Supervision;Stand by assistance (Washed hands and performed oral care standing at sink)  UB Bathing: Stand by assistance  LB Bathing: Stand by assistance  UE Dressing: Stand by assistance  LE Dressing: Stand by assistance (Pt donned underware- pt seated at edge of recliner chair to thread feet into underwear and standing at edge of recliner chair to pull up underwear over hips)  Toileting: Stand by assistance (Pt stood at toilet to void, use of grab bar for support)  Comments: SBA as pt mildly unsteady with decreased awareness of lines/need for safety techniques. Bed mobility  Bed Mobility Comments: Pt up to chair at start of session and retired up to chair at end of session.      Transfers  Sit to stand: Stand by assistance (Pt performed sit to  chair)  Stand to sit: Stand by assistance (Pt in chair at end of session)  Transfer Comments: Pt required min VC for safety and line management as pt with quick/impulsive movements        Cognition  Orientation: WFL  Overall Cognitive Status: Exceptions (Decreased awareness of need for safety, decreased awareness of need for assistance, decreased awareness of errors)      AM-PAC Score   -[facility] Inpatient Daily Activity Raw Score: 19 (05/24/22 1551)  AM-PAC Inpatient ADL T-Scale Score : 40.22 (05/24/22 1551)  ADL Inpatient CMS 0-100% Score: 42.8 (05/24/22 1551)  ADL Inpatient CMS G-Code Modifier : CK (05/24/22 1551)    Goals  Short Term Goals  Time Frame for Short term goals: Pt will, by discharge  Short Term Goal 1: demo safety awareness during all ADL tasks/functional transfers/functional mobility independently  Short Term Goal 2: pt will perform functional transfer/functional mobility independently  Short Term Goal 3: incorporate safety techniques during ADL's independently  Short Term Goal 4: demo functional task performance/ADLs independently       Therapy Time   Individual Concurrent Group Co-treatment   Time In 0900         Time Out 0930         Minutes 30         Timed Code Treatment Minutes: 10 Minutes        The above evaluation/treatment was completed and documented by JOHN Valenzuela/OT.     Maikel Perez OTR/L

## 2022-05-24 NOTE — PROGRESS NOTES
Physical Therapy  Facility/Department: [facility] CAR [facility]  Physical Therapy Initial Assessment    Name: Link Nieto  : 1955  MRN: [id number]  Date of Service: 2022  Patient Active Problem List   Diagnosis    Hyperlipidemia, mixed    Essential hypertension    [facility] ([facility] [address]    Primary malignant neoplasm of left lower lobe of lung ([facility] [address]      PLAN:  OR for left lower lobectomy by VATS    Discharge Recommendations:  Patient would benefit from continued therapy after discharge   PT Equipment Recommendations  Equipment Needed: No      Patient Diagnosis(es): The encounter diagnosis was Post-op pain. Past Medical History:  has a past medical history of Cancer [facility]), Health care maintenance, Hyperlipidemia, Hypertension, and Lung cancer ([facility] [address] Past Surgical History:  has a past surgical history that includes Achilles tendon surgery; Colonoscopy; CT NEEDLE BIOPSY LUNG PERCUTANEOUS (2022); lobectomy (Left, 2022); lymph node dissection (Left, 2022); and Lung removal, total (Left, 2022). Assessment   Body Structures, Functions, Activity Limitations Requiring Skilled Therapeutic Intervention: Decreased functional mobility ; Increased pain  Assessment: Very little difficulty with mobility for PT eval.  Will continue for greater gait distances and negotiating steps.   Therapy Prognosis: Good  Decision Making: Low Complexity  Clinical Presentation: stable  Requires PT Follow-Up: Yes  Activity Tolerance  Activity Tolerance: Patient tolerated evaluation without incident     Plan   Plan  Plan:  (5x/wk)  Current Treatment Recommendations: Strengthening,Gait training,Stair training,Patient/Caregiver education & training,Home exercise program,Therapeutic activities  Safety Devices  Type of Devices: Call light within reach,Left in chair,Gait belt,Nurse notified,All fall risk precautions in place  Restraints  Restraints Initially in Place: No     Restrictions  Position Activity Restriction  Other position/activity restrictions: Up to chair, ambulate pt.  Pt with 2L O2 via nasal cannula, L sided chest tube     Subjective   General  Chart Reviewed: Yes  Patient assessed for rehabilitation services?: Yes  Family / Caregiver Present: Yes (wife)  Follows Commands: Within Functional Limits  Subjective  Subjective: Pain 8/10 this AM         Social/Functional History  Social/Functional History  Lives With: Spouse  Type of Home: House  Home Layout: Two level,Bed/Bath upstairs,1/2 bath on main level  Home Access: Stairs to enter without rails  Entrance Stairs - Number of Steps: 2  Bathroom Shower/Tub: Tub/Shower unit (No grab bars)  Bathroom Toilet: Standard (No grab bars)  Receives Help From: Family (Pt wife able to help as necessary, wife is retired as well)  ADL Assistance: Independent  Homemaking Assistance: Independent  Homemaking Responsibilities: Yes (shres responsibilities with wife)  Ambulation Assistance: Independent  Transfer Assistance: Independent  Active : Yes  Mode of Transportation: Car  Occupation: Retired  Type of Occupation:   Leisure & Hobbies: Watching golf, watching hockey, grilling/cooking, yardwork  Vision/Hearing  Vision Exceptions: Wears glasses for reading  Hearing: Within functional limits    Cognition   Orientation  Overall Orientation Status: Within Functional Limits  Cognition  Overall Cognitive Status: WNL     Objective   Pulse: 64  SpO2: 96 %  O2 Device: None (Room air)  Comment: Ambulated on 2 LPM oxygen     Observation/Palpation  Observation: Left side chest tube to water seal.        AROM RLE (degrees)  RLE AROM: WNL  AROM LLE (degrees)  LLE AROM : WNL  Strength RLE  Strength RLE: Exception  R Hip Flexion: 5/5  R Knee Extension: 5/5  R Ankle Dorsiflexion: 5/5  R Ankle Plantar flexion: 5/5  Strength LLE  Strength LLE: Exception  L Hip Flexion: 4+/5  L Knee Extension: 5/5  L Ankle Dorsiflexion: 5/5  L Ankle Plantar Flexion: 5/5   Bed mobility  Bed Mobility Comments: Pt in chair at start of session  Transfers  Sit to Stand: Stand by assistance  Stand to sit: Stand by assistance  Ambulation  Surface: level tile  Device: No Device  Assistance: Stand by assistance  Gait Deviations: None  Distance: 200'  Comments: Cued to take his time, think about his breathing. Patient appeared to have no difficulty with gait. Balance  Sitting - Static: Good  Sitting - Dynamic: Good  Standing - Static: Good  Standing - Dynamic: Good         AM-PAC Score  AM-PAC Inpatient Mobility Raw Score : 23 (05/24/22 1445)  AM-PAC Inpatient T-Scale Score : 56.93 (05/24/22 1445)  Mobility Inpatient CMS 0-100% Score: 11.2 (05/24/22 1445)  Mobility Inpatient CMS G-Code Modifier : CI (05/24/22 1445)          Goals  Short Term Goals  Time Frame for Short term goals: 8 visits  Short term goal 1: Ambulate 400' without device with supervision. Short term goal 2: Negotiate up and down 2 steps with one railing with CGA. Short term goal 3: Demonstrate a standing LE HEP. Education  Patient Education  Education Given To: Patient; Family  Education Provided: Role of Therapy;Plan of Care; Fall Prevention Strategies;Precautions;Transfer Training  Education Method: Demonstration;Verbal  Education Outcome: Verbalized understanding      Therapy Time   Individual Concurrent Group Co-treatment   Time In 0900         Time Out 0929         Minutes 29         Timed Code Treatment Minutes: 8 Minutes       Leonel Thomas, PT

## 2022-05-24 NOTE — PROGRESS NOTES
planning      Assessment/ Plan:    Water seal chest tube this AM-CXR stable no appreciated pneumothorax  eval CXR at 11AM-If stable we will remove chest tubes  Pt evaluation  potenital home O2 eval  Goal to DC patient this evening or Early AM      DEYANIRA BE, APRN - NP

## 2022-05-25 ENCOUNTER — APPOINTMENT (OUTPATIENT)
Dept: GENERAL RADIOLOGY | Age: 67
DRG: 165 | End: 2022-05-25
Attending: THORACIC SURGERY (CARDIOTHORACIC VASCULAR SURGERY)
Payer: MEDICARE

## 2022-05-25 PROCEDURE — 71045 X-RAY EXAM CHEST 1 VIEW: CPT

## 2022-05-25 PROCEDURE — 2580000003 HC RX 258: Performed by: PHYSICIAN ASSISTANT

## 2022-05-25 PROCEDURE — 2140000001 HC CVICU INTERMEDIATE R&B

## 2022-05-25 PROCEDURE — 6370000000 HC RX 637 (ALT 250 FOR IP): Performed by: NURSE PRACTITIONER

## 2022-05-25 PROCEDURE — 6370000000 HC RX 637 (ALT 250 FOR IP): Performed by: THORACIC SURGERY (CARDIOTHORACIC VASCULAR SURGERY)

## 2022-05-25 PROCEDURE — 6370000000 HC RX 637 (ALT 250 FOR IP): Performed by: PHYSICIAN ASSISTANT

## 2022-05-25 PROCEDURE — 93005 ELECTROCARDIOGRAM TRACING: CPT | Performed by: THORACIC SURGERY (CARDIOTHORACIC VASCULAR SURGERY)

## 2022-05-25 PROCEDURE — 6360000002 HC RX W HCPCS: Performed by: PHYSICIAN ASSISTANT

## 2022-05-25 RX ADMIN — DEXTROSE MONOHYDRATE 150 MG: 50 INJECTION, SOLUTION INTRAVENOUS at 11:55

## 2022-05-25 RX ADMIN — HYDROCHLOROTHIAZIDE 12.5 MG: 25 TABLET ORAL at 08:30

## 2022-05-25 RX ADMIN — AMIODARONE HYDROCHLORIDE 1 MG/MIN: 50 INJECTION, SOLUTION INTRAVENOUS at 12:04

## 2022-05-25 RX ADMIN — LISINOPRIL 20 MG: 20 TABLET ORAL at 08:30

## 2022-05-25 RX ADMIN — FAMOTIDINE 20 MG: 20 TABLET, FILM COATED ORAL at 20:33

## 2022-05-25 RX ADMIN — SODIUM CHLORIDE, PRESERVATIVE FREE 10 ML: 5 INJECTION INTRAVENOUS at 08:30

## 2022-05-25 RX ADMIN — OXYCODONE HYDROCHLORIDE AND ACETAMINOPHEN 2 TABLET: 5; 325 TABLET ORAL at 20:29

## 2022-05-25 RX ADMIN — OXYCODONE HYDROCHLORIDE AND ACETAMINOPHEN 2 TABLET: 5; 325 TABLET ORAL at 04:16

## 2022-05-25 RX ADMIN — FAMOTIDINE 20 MG: 20 TABLET, FILM COATED ORAL at 08:30

## 2022-05-25 RX ADMIN — AMIODARONE HYDROCHLORIDE 0.5 MG/MIN: 50 INJECTION, SOLUTION INTRAVENOUS at 20:32

## 2022-05-25 RX ADMIN — OXYCODONE HYDROCHLORIDE AND ACETAMINOPHEN 1 TABLET: 5; 325 TABLET ORAL at 12:38

## 2022-05-25 ASSESSMENT — PAIN DESCRIPTION - DESCRIPTORS
DESCRIPTORS: DISCOMFORT;ACHING
DESCRIPTORS: ACHING;DISCOMFORT;NAGGING

## 2022-05-25 ASSESSMENT — PAIN DESCRIPTION - PAIN TYPE
TYPE: SURGICAL PAIN
TYPE: SURGICAL PAIN

## 2022-05-25 ASSESSMENT — PAIN SCALES - GENERAL
PAINLEVEL_OUTOF10: 3
PAINLEVEL_OUTOF10: 7
PAINLEVEL_OUTOF10: 7

## 2022-05-25 ASSESSMENT — PAIN DESCRIPTION - ONSET
ONSET: ON-GOING
ONSET: GRADUAL

## 2022-05-25 ASSESSMENT — PAIN - FUNCTIONAL ASSESSMENT: PAIN_FUNCTIONAL_ASSESSMENT: PREVENTS OR INTERFERES SOME ACTIVE ACTIVITIES AND ADLS

## 2022-05-25 ASSESSMENT — PAIN DESCRIPTION - LOCATION
LOCATION: CHEST
LOCATION: CHEST

## 2022-05-25 ASSESSMENT — PAIN DESCRIPTION - ORIENTATION
ORIENTATION: LEFT
ORIENTATION: LEFT

## 2022-05-25 ASSESSMENT — PAIN DESCRIPTION - FREQUENCY
FREQUENCY: INTERMITTENT
FREQUENCY: INTERMITTENT

## 2022-05-25 NOTE — DISCHARGE INSTR - COC
Continuity of Care Form    Patient Name: Desean Romero   :  1955  MRN:  [id number]    Admit date:  2022  Discharge date:  2022    Code Status Order: Full Code   Advance Directives:   [facility] Documentation       Date/Time Healthcare Directive Type of Healthcare Directive Copy in [address] Agent's Name Healthcare Agent's Phone Number    [phone] Yes, patient has an advance directive for healthcare treatment Durable power of  for health care; Health care treatment directive; Living will No, copy requested from family Spouse shelley --            Admitting Physician:  Noah Crum MD  PCP: Bigg Cowan MD    Discharging Nurse: [facility] Unit/Room#: 1005/1005-01  Discharging Unit Phone Number: [phone]    Emergency Contact:   Extended Emergency Contact Information  Primary Emergency Contact: Brenda Randhawa, [address] Phone: [phone]  Relation: Spouse    Past Surgical History:  Past Surgical History:   Procedure Laterality Date    ACHILLES TENDON SURGERY      about 30 years ago     COLONOSCOPY      CT NEEDLE BIOPSY LUNG PERCUTANEOUS  2022    CT NEEDLE BIOPSY LUNG PERCUTANEOUS 2022 [facility] CT SCAN    LOBECTOMY Left 2022    robotic video assisted thoracoscoopy, lower lobectomy with lymphnode dissection    LUNG REMOVAL, TOTAL Left 2022    XI ROBOTIC VIDEO ASSISTED THORACOSCOPY, LOWER LOBECTOMY performed by Noah Crum MD at [place] [address] Left 2022       Immunization History:   Immunization History   Administered Date(s) Administered    COVID-19, Pfizer Purple top, DILUTE for use, 12+ yrs, 30mcg/0.3mL dose 2021, 03/10/2021, 2021    Influenza, High Dose (Fluzone 65 yrs and older) 2021       Active Problems:  Patient Active Problem List   Diagnosis Code    Hyperlipidemia, mixed E78.2    Essential hypertension I10    Oropharyngeal cancer ([facility] [address] C10.9

## 2022-05-25 NOTE — PROGRESS NOTES
CLINICAL PHARMACY NOTE: MEDS TO BEDS    Total # of Prescriptions Filled: 3   The following medications were delivered to the patient:  · PERCOCET 5-325  · PEPCID 20  · LIDOCAINE PATCH %4     Additional Documentation:    MEDS DELIVERED TO PT IN ROOM 1005, PAID WITH CARD ON CLOVER

## 2022-05-26 ENCOUNTER — APPOINTMENT (OUTPATIENT)
Dept: GENERAL RADIOLOGY | Age: 67
DRG: 165 | End: 2022-05-26
Attending: THORACIC SURGERY (CARDIOTHORACIC VASCULAR SURGERY)
Payer: MEDICARE

## 2022-05-26 LAB
EKG ATRIAL RATE: 107 BPM
EKG Q-T INTERVAL: 320 MS
EKG QRS DURATION: 92 MS
EKG QTC CALCULATION (BAZETT): 450 MS
EKG R AXIS: -39 DEGREES
EKG T AXIS: 33 DEGREES
EKG VENTRICULAR RATE: 119 BPM
SURGICAL PATHOLOGY REPORT: NORMAL

## 2022-05-26 PROCEDURE — 97535 SELF CARE MNGMENT TRAINING: CPT

## 2022-05-26 PROCEDURE — 93005 ELECTROCARDIOGRAM TRACING: CPT | Performed by: THORACIC SURGERY (CARDIOTHORACIC VASCULAR SURGERY)

## 2022-05-26 PROCEDURE — 6370000000 HC RX 637 (ALT 250 FOR IP): Performed by: STUDENT IN AN ORGANIZED HEALTH CARE EDUCATION/TRAINING PROGRAM

## 2022-05-26 PROCEDURE — 2580000003 HC RX 258: Performed by: PHYSICIAN ASSISTANT

## 2022-05-26 PROCEDURE — 93005 ELECTROCARDIOGRAM TRACING: CPT | Performed by: STUDENT IN AN ORGANIZED HEALTH CARE EDUCATION/TRAINING PROGRAM

## 2022-05-26 PROCEDURE — 71045 X-RAY EXAM CHEST 1 VIEW: CPT

## 2022-05-26 PROCEDURE — 6360000002 HC RX W HCPCS: Performed by: THORACIC SURGERY (CARDIOTHORACIC VASCULAR SURGERY)

## 2022-05-26 PROCEDURE — 99024 POSTOP FOLLOW-UP VISIT: CPT | Performed by: NURSE PRACTITIONER

## 2022-05-26 PROCEDURE — 6370000000 HC RX 637 (ALT 250 FOR IP): Performed by: PHYSICIAN ASSISTANT

## 2022-05-26 PROCEDURE — 2060000000 HC ICU INTERMEDIATE R&B

## 2022-05-26 PROCEDURE — 93010 ELECTROCARDIOGRAM REPORT: CPT | Performed by: INTERNAL MEDICINE

## 2022-05-26 PROCEDURE — 97530 THERAPEUTIC ACTIVITIES: CPT

## 2022-05-26 PROCEDURE — 94761 N-INVAS EAR/PLS OXIMETRY MLT: CPT

## 2022-05-26 PROCEDURE — 94150 VITAL CAPACITY TEST: CPT

## 2022-05-26 PROCEDURE — 6370000000 HC RX 637 (ALT 250 FOR IP): Performed by: THORACIC SURGERY (CARDIOTHORACIC VASCULAR SURGERY)

## 2022-05-26 PROCEDURE — 94669 MECHANICAL CHEST WALL OSCILL: CPT

## 2022-05-26 RX ORDER — AMIODARONE HYDROCHLORIDE 200 MG/1
200 TABLET ORAL 2 TIMES DAILY
Qty: 30 TABLET | Refills: 1 | Status: SHIPPED | OUTPATIENT
Start: 2022-05-26 | End: 2022-08-30 | Stop reason: ALTCHOICE

## 2022-05-26 RX ORDER — LISINOPRIL 10 MG/1
10 TABLET ORAL DAILY
Status: DISCONTINUED | OUTPATIENT
Start: 2022-05-27 | End: 2022-05-27 | Stop reason: HOSPADM

## 2022-05-26 RX ORDER — MAGNESIUM SULFATE IN WATER 40 MG/ML
2000 INJECTION, SOLUTION INTRAVENOUS ONCE
Status: COMPLETED | OUTPATIENT
Start: 2022-05-26 | End: 2022-05-26

## 2022-05-26 RX ORDER — AMIODARONE HYDROCHLORIDE 200 MG/1
200 TABLET ORAL 2 TIMES DAILY
Status: DISCONTINUED | OUTPATIENT
Start: 2022-05-26 | End: 2022-05-27 | Stop reason: HOSPADM

## 2022-05-26 RX ADMIN — HYDROCHLOROTHIAZIDE 12.5 MG: 25 TABLET ORAL at 08:29

## 2022-05-26 RX ADMIN — MAGNESIUM SULFATE 2000 MG: 2 INJECTION INTRAVENOUS at 17:25

## 2022-05-26 RX ADMIN — BISACODYL 5 MG: 5 TABLET, COATED ORAL at 14:25

## 2022-05-26 RX ADMIN — SODIUM CHLORIDE, PRESERVATIVE FREE 10 ML: 5 INJECTION INTRAVENOUS at 08:37

## 2022-05-26 RX ADMIN — METOPROLOL TARTRATE 25 MG: 25 TABLET ORAL at 23:22

## 2022-05-26 RX ADMIN — LISINOPRIL 20 MG: 20 TABLET ORAL at 08:29

## 2022-05-26 RX ADMIN — METOPROLOL TARTRATE 25 MG: 25 TABLET ORAL at 14:30

## 2022-05-26 RX ADMIN — OXYCODONE HYDROCHLORIDE AND ACETAMINOPHEN 1 TABLET: 5; 325 TABLET ORAL at 20:46

## 2022-05-26 RX ADMIN — OXYCODONE HYDROCHLORIDE AND ACETAMINOPHEN 2 TABLET: 5; 325 TABLET ORAL at 14:30

## 2022-05-26 RX ADMIN — FAMOTIDINE 20 MG: 20 TABLET, FILM COATED ORAL at 08:29

## 2022-05-26 RX ADMIN — SODIUM CHLORIDE, PRESERVATIVE FREE 10 ML: 5 INJECTION INTRAVENOUS at 20:46

## 2022-05-26 RX ADMIN — AMIODARONE HYDROCHLORIDE 200 MG: 200 TABLET ORAL at 20:45

## 2022-05-26 RX ADMIN — FAMOTIDINE 20 MG: 20 TABLET, FILM COATED ORAL at 20:45

## 2022-05-26 RX ADMIN — AMIODARONE HYDROCHLORIDE 200 MG: 200 TABLET ORAL at 14:21

## 2022-05-26 RX ADMIN — OXYCODONE HYDROCHLORIDE AND ACETAMINOPHEN 2 TABLET: 5; 325 TABLET ORAL at 03:15

## 2022-05-26 RX ADMIN — OXYCODONE HYDROCHLORIDE AND ACETAMINOPHEN 2 TABLET: 5; 325 TABLET ORAL at 08:33

## 2022-05-26 ASSESSMENT — PAIN DESCRIPTION - DESCRIPTORS
DESCRIPTORS: ACHING
DESCRIPTORS: ACHING

## 2022-05-26 ASSESSMENT — PAIN DESCRIPTION - LOCATION
LOCATION: FLANK
LOCATION: FLANK

## 2022-05-26 ASSESSMENT — PAIN SCALES - GENERAL
PAINLEVEL_OUTOF10: 0
PAINLEVEL_OUTOF10: 2
PAINLEVEL_OUTOF10: 6
PAINLEVEL_OUTOF10: 0

## 2022-05-26 ASSESSMENT — PAIN DESCRIPTION - ORIENTATION
ORIENTATION: LEFT
ORIENTATION: LEFT

## 2022-05-26 NOTE — PLAN OF CARE
Problem: Discharge Planning  Goal: Discharge to home or other facility with appropriate resources  5/26/2022 0840 by James Johns RN  Outcome: Progressing  5/26/2022 0606 by Sal Gillespie RN  Outcome: Progressing  Flowsheets (Taken 5/25/2022 2000)  Discharge to home or other facility with appropriate resources:   Identify barriers to discharge with patient and caregiver   Arrange for needed discharge resources and transportation as appropriate     Problem: Pain  Goal: Verbalizes/displays adequate comfort level or baseline comfort level  5/26/2022 0840 by James Johns RN  Outcome: Progressing  5/26/2022 0606 by Sal Gillespie RN  Outcome: Progressing     Problem: Safety - Adult  Goal: Free from fall injury  5/26/2022 0840 by James Johns RN  Outcome: Progressing  5/26/2022 0606 by Sal Gillespie RN  Outcome: Progressing     Problem: ABCDS Injury Assessment  Goal: Absence of physical injury  5/26/2022 0840 by James Johns RN  Outcome: Progressing  5/26/2022 0606 by Sal Gillespie RN  Outcome: Progressing

## 2022-05-26 NOTE — CONSULTS
syncope. Past Medical History:   [facility] Health care maintenance, Hyperlipidemia, Hypertension, and Lung cancer ([facility] [address] Past Surgical History:   has a past surgical history that includes Achilles tendon surgery; Colonoscopy; CT NEEDLE BIOPSY LUNG PERCUTANEOUS (4/6/2022); lobectomy (Left, 05/23/2022); lymph node dissection (Left, 05/23/2022); and Lung removal, total (Left, 5/23/2022). Home Medications:    Prior to Admission medications    Medication Sig Start Date End Date Taking? Authorizing Provider   amiodarone (CORDARONE) 200 MG tablet Take 1 tablet by mouth 2 times daily 5/26/22  Yes Mihaela Solano MD   famotidine (PEPCID) 20 MG tablet Take 1 tablet by mouth 2 times daily 5/24/22  Yes OVIDIO Savage NP   lidocaine 4 % external patch Place 1 patch onto the skin daily 5/24/22  Yes OVIDIO Savage NP   oxyCODONE-acetaminophen (PERCOCET) 5-325 MG per tablet Take 1 tablet by mouth every 8 hours as needed for Pain for up to 7 days.  5/24/22 5/31/22 Yes OVIDIO Savage NP   aspirin 81 MG chewable tablet Take 81 mg by mouth daily  Patient not taking: Reported on 5/23/2022    Historical Provider, MD   simvastatin (ZOCOR) 20 MG tablet TAKE 1 TABLET BY MOUTH AT  NIGHT 1/19/22   Zafar Stoddard MD   hydroCHLOROthiazide (HYDRODIURIL) 12.5 MG tablet TAKE 1 TABLET BY MOUTH  DAILY 11/18/21 2/16/22  Zafar Stoddard MD   lisinopril (PRINIVIL;ZESTRIL) 20 MG tablet TAKE 1 TABLET BY MOUTH  DAILY 11/18/21 2/16/22  Zafar Stoddard MD   omeprazole (PRILOSEC) 20 MG delayed release capsule Take 20 mg by mouth daily Pt takes 10mg daily    Historical Provider, MD   Multiple Vitamins-Minerals (THERAPEUTIC MULTIVITAMIN-MINERALS) tablet Take 1 tablet by mouth daily    Historical Provider, MD      Current Facility-Administered Medications: magnesium sulfate 2000 mg in 50 mL IVPB premix, 2,000 mg, IntraVENous, Once  amiodarone (CORDARONE) tablet 200 mg, 200 mg, Oral, BID  metoprolol tartrate (LOPRESSOR) tablet 25 mg, 25 mg, Oral, BID  [COMPLETED] amiodarone (CORDARONE) 150 mg in dextrose 5 % 100 mL bolus, 150 mg, IntraVENous, Once **FOLLOWED BY** [] amiodarone (CORDARONE) 450 mg in dextrose 5 % 250 mL infusion, 1 mg/min, IntraVENous, Continuous **FOLLOWED BY** amiodarone (CORDARONE) 450 mg in dextrose 5 % 250 mL infusion, 0.5 mg/min, IntraVENous, Continuous  lidocaine 4 % external patch 1 patch, 1 patch, TransDERmal, Daily  sodium chloride flush 0.9 % injection 5-40 mL, 5-40 mL, IntraVENous, 2 times per day  sodium chloride flush 0.9 % injection 5-40 mL, 5-40 mL, IntraVENous, PRN  0.9 % sodium chloride infusion, , IntraVENous, PRN  oxyCODONE-acetaminophen (PERCOCET) 5-325 MG per tablet 1 tablet, 1 tablet, Oral, Q4H PRN **OR** oxyCODONE-acetaminophen (PERCOCET) 5-325 MG per tablet 2 tablet, 2 tablet, Oral, Q4H PRN  morphine (PF) injection 2 mg, 2 mg, IntraVENous, Q2H PRN **OR** morphine injection 4 mg, 4 mg, IntraVENous, Q2H PRN  bisacodyl (DULCOLAX) EC tablet 5 mg, 5 mg, Oral, Daily  magnesium hydroxide (MILK OF MAGNESIA) 400 MG/5ML suspension 30 mL, 30 mL, Oral, Daily PRN  fleet rectal enema 1 enema, 1 enema, Rectal, Daily PRN  ondansetron (ZOFRAN) injection 4 mg, 4 mg, IntraVENous, Q6H PRN  famotidine (PEPCID) tablet 20 mg, 20 mg, Oral, BID **OR** famotidine (PEPCID) 20 mg in sodium chloride (PF) 10 mL injection, 20 mg, IntraVENous, BID  lisinopril (PRINIVIL;ZESTRIL) tablet 20 mg, 20 mg, Oral, Daily  hydroCHLOROthiazide (HYDRODIURIL) tablet 12.5 mg, 12.5 mg, Oral, Daily    Allergies:  Seasonal    Social History:   reports that he quit smoking about 8 weeks ago. His smoking use included cigarettes. He has never used smokeless tobacco. He reports current alcohol use. He reports previous drug use. Family History: family history includes Cancer in his father; Coronary Art Dis in his mother. No h/o sudden cardiac death. No for premature CAD    REVIEW OF SYSTEMS:    · Constitutional: there has been no unanticipated weight loss. There's been No change in energy level, No change in activity level. · Eyes: No visual changes or diplopia. No scleral icterus. · ENT: No Headaches  · Cardiovascular: As above. · Respiratory: No previous pulmonary problems, No cough  · Gastrointestinal: No abdominal pain. No change in bowel or bladder habits. · Genitourinary: No dysuria, trouble voiding, or hematuria. · Musculoskeletal:  No gait disturbance, No weakness or joint complaints. · Integumentary: No rash or pruritis. · Neurological: No headache, diplopia, change in muscle strength, numbness or tingling. No change in gait, balance, coordination, mood, affect, memory, mentation, behavior. · Psychiatric: No anxiety, or depression. · Endocrine: No temperature intolerance. No excessive thirst, fluid intake, or urination. No tremor. · Hematologic/Lymphatic: No abnormal bruising or bleeding, blood clots or swollen lymph nodes. · Allergic/Immunologic: No nasal congestion or hives. PHYSICAL EXAM:      /83   Pulse 80   Temp 98.2 °F (36.8 °C) (Oral)   Resp 21   Ht 5' 8\" (1.727 m)   Wt 170 lb (77.1 kg)   SpO2 95%   BMI 25.85 kg/m²    Constitutional and General Appearance: alert, cooperative, no distress and appears stated age  HEENT: PERRL, no cervical lymphadenopathy. No masses palpable. Normal oral mucosa  Respiratory:  · Normal excursion and expansion without use of accessory muscles  · Resp Auscultation: Good respiratory effort. No for increased work of breathing. On auscultation: clear to auscultation bilaterally  Cardiovascular:  · The apical impulse is not displaced  · Heart tones are crisp and normal. regular S1 and S2. Abdomen:   · No masses or tenderness  · Bowel sounds present  Extremities:  ·  No Cyanosis or Clubbing  ·  Lower extremity edema: No  ·  Skin: Warm and dry  Neurological:  · Alert and oriented.   · Moves all extremities well  · No abnormalities of mood, affect, memory, mentation, or behavior are noted    Labs:     CBC:   Recent Labs     05/24/22  0336   WBC 10.3   HGB 13.2   HCT 38.7*        BMP:   Recent Labs     05/24/22  0336      K 4.7   CO2 24   BUN 16   CREATININE 0.72   LABGLOM >60   GLUCOSE 126*     BNP: No results for input(s): BNP in the last 72 hours. PT/INR: No results for input(s): PROTIME, INR in the last 72 hours. APTT:No results for input(s): APTT in the last 72 hours. CARDIAC ENZYMES:No results for input(s): CKTOTAL, CKMB, CKMBINDEX, TROPONINI in the last 72 hours. FASTING LIPID PANEL:No results found for: HDL, LDLDIRECT, LDLCALC, TRIG  LIVER PROFILE:No results for input(s): AST, ALT, LABALBU in the last 72 hours. DATA:    Diagnostics:    EKG: Afib with RVR,   ECHO: Ordered. IMPRESSION:    1.  A. fib with RVR which is new onset, Chads Vasc of 2. Currently back in NSR and on Amiodarone drip. Patient is having PACs. 2.  Left lower lobe lung adenocarcinoma diagnosed in April 2022 status post robotic LLL lobectomy and lymph node dissection. 3.  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 4. HTN. 5. HLD. RECOMMENDATIONS:  1. Patient is back in normal sinus rhythm. We will get an EKG to confirm. 2. We will follow-up an echocardiogram.  3. We will start the patient on beta-blocker and uptitrate as needed. 4. Will need to discharge the patient on oral anticoagulation, likely NOAC if no further procedures planned. 5. Discussed with patient and family. Discussed with patient and Nurse.     Homer Monroy MD       Cardiovascular Fellow PGY-4  5/26/2022, 2:09 PM

## 2022-05-26 NOTE — PLAN OF CARE
Problem: Discharge Planning  Goal: Discharge to home or other facility with appropriate resources  5/26/2022 0606 by Shonda Senior RN  Outcome: Progressing  Flowsheets (Taken 5/25/2022 2000)  Discharge to home or other facility with appropriate resources:   Identify barriers to discharge with patient and caregiver   Arrange for needed discharge resources and transportation as appropriate  5/25/2022 1640 by Valentin Gallego RN  Outcome: Progressing     Problem: Pain  Goal: Verbalizes/displays adequate comfort level or baseline comfort level  5/26/2022 0606 by Shonda Senior RN  Outcome: Progressing  5/25/2022 1640 by Valentin Gallego RN  Outcome: Progressing     Problem: Safety - Adult  Goal: Free from fall injury  5/26/2022 0606 by Shonda Senior RN  Outcome: Progressing  5/25/2022 1640 by Valentin Gallego RN  Outcome: Progressing     Problem: ABCDS Injury Assessment  Goal: Absence of physical injury  5/26/2022 0606 by Shonda Senior RN  Outcome: Progressing  5/25/2022 1640 by Valentin Gallego RN  Outcome: Progressing

## 2022-05-26 NOTE — PROGRESS NOTES
Occupational Therapy  Facility/Department: [facility]  Daily Treatment Note  NAME: Thania Edwards  : 1955  MRN: [id number]    Date of Service: 2022    Discharge Recommendations:  Patient would benefit from continued therapy after discharge  Patient Diagnosis(es): The encounter diagnosis was Post-op pain. Assessment   Performance deficits / Impairments: Decreased safe awareness;Decreased endurance;Decreased functional mobility ; Decreased ADL status  Prognosis: Good  Activity tolerance: Pt tolerated treatment well  Plan   Plan  Times per Week: 3-5x/wk  Current Treatment Recommendations: Strengthening; Functional mobility training; Endurance training; Safety education & training;Pain management;Patient/Caregiver education & training   Restrictions  Restrictions/Precautions  Required Braces or Orthoses?: No  Position Activity Restriction  Other position/activity restrictions: Up to chair, ambulate pt. Pain Assessment: Pt denies pain this day  Subjective    Overall orientation level: WFL  Cognition  Overall Cognitive Status: WFL  Objective    Bed Mobility Training  Bed Mobility Training: Yes  Overall Level of Assistance: Independent  Supine to Sit: Modified independent  Sit to Supine: Modified independent  Scooting: Independent  Balance  Sitting: Without support (Seated EOB x 2 minutes)  Standing: Without support (Functional mobility to/from bathroom w/o device, ADL care standing at sink. Total time 10 minutes.)  Transfer Training  Transfer Training: Yes  Overall Level of Assistance: Modified independent  Sit to Stand: Modified independent  Stand to Sit: Independent  Gait  Overall Level of Assistance: Independent  Assistive Device: Other (comment) (w/o device.)  ADL  Feeding: Beverage management; Independent  Grooming: Setup; Independent; Increased time to complete (Oral care/shave face standing at sink.)  UE Bathing: Setup; Independent; Increased time to complete [facility] face/hands standing at sink.)  Additional Comments: Pt completed supine/sit transfer to seated EOB. Sit/stand transfer w/o device for functional mobility to bathroom. Pt completed ADL care standing at sink. Pt experiencing bouts of nausea x3, symptoms alleviated by burping with no emesis. Pt exited bathroom to return to supine in bed. Safety Devices  Type of Devices: Gait belt;Left in bed;Nurse notified;Call light within reach   Patient Education  Education Given To: Patient  Education Provided Comments: OT POC, transfer safety, importance of participation in therapy with good return. AMPAC: raw score 23  Goals  Short Term Goals  Time Frame for Short term goals: Pt will, by discharge  Short Term Goal 1: demo safety awareness during all ADL tasks/functional transfers/functional mobility independently  Short Term Goal 2: pt will perform functional transfer/functional mobility independently  Short Term Goal 3: incorporate safety techniques during ADL's independently  Short Term Goal 4: demo functional task performance/ADLs independently       Therapy Time   Individual Concurrent Group Co-treatment   Time In 1025         Time Out 1050         Minutes 25         Timed Code Treatment Minutes: 25 Minutes     Pt supine in bed upon therapist arrival. Pleasant and agreeable to therapy. See above for LOF for all tasks. Pt retired to supine in bed at end of session with call light within reach.     BRIAN Leon

## 2022-05-26 NOTE — PROGRESS NOTES
[facility] Cardiothoracic Surgery  Progress Note    5/26/2022 9:27 AM  Surgeon: Surgeon CTS: Dr. Marcellus Kee MD   POD # 3  S/P: left robotic lobectomy      Subjective:  Mr. Danika Montoya   Resting in bed with minimal O2 NC. No CP or SOB  Objective:  /71   Pulse 74   Temp 98.1 °F (36.7 °C) (Oral)   Resp 20   Ht 5' 8\" (1.727 m)   Wt 170 lb (77.1 kg)   SpO2 94%   BMI 25.85 kg/m²   Chest: pacing wires: no, chest tubes:yes, air leak no, 2 +  CV: no murmur noted, Normal S1, S2, NSR  Lungs: clear to auscultation, no wheezes, rales, or rhonchi  Abd: normal bowel sounds   Lower Extremities: Trace edema  Saph Incison: no sign of drainage or infection  Sternal Incison: dressing applied-no excessive drainage or signs of infection noted  CXR-stable no pneumothorax present. No effusions present. Labs: Labs reviewed today  CBC:   Recent Labs     05/24/22  0336   WBC 10.3   HGB 13.2   HCT 38.7*   MCV 89.0        BMP:   Recent Labs     05/24/22  0336      K 4.7      CO2 24   BUN 16   CREATININE 0.72       I/O: I/O last 3 completed shifts: In: 757.3 [P.O.:240;  I.V.:517.3]  Out: 2570 [Urine:2570]  Scheduled Meds:   lidocaine  1 patch TransDERmal Daily    sodium chloride flush  5-40 mL IntraVENous 2 times per day    bisacodyl  5 mg Oral Daily    famotidine  20 mg Oral BID    Or    famotidine (PEPCID) injection  20 mg IntraVENous BID    lisinopril  20 mg Oral Daily    hydroCHLOROthiazide  12.5 mg Oral Daily     Continuous Infusions:   amiodarone 0.5 mg/min (05/26/22 0600)    sodium chloride       PRN Meds:sodium chloride flush, sodium chloride, oxyCODONE-acetaminophen **OR** oxyCODONE-acetaminophen, morphine **OR** morphine, magnesium hydroxide, fleet, ondansetron      Daily Nursing Care:  Please keep SCDS in place as DVT prophylaxis  If not intubated, Please have patient use IS and acapella 10X/hr or during commercial breaks  Please continue BM management  Daily labs and CXR  Daily PT/OT and ambulation  Continue with Case Management for DC planning      Assessment/ Plan:    Chest tube sout with no issues  Close to DC yday patient had AFIB RVR therefore kept an additional day    This AM rate controlled AFIB  Please switch to Amiodarone PO-DC amiodarone Gtt  It is up to cardiology regarding PO AC medication  DC today is dependent on cardiology  DC today-home with wife      Nery Tomas, APRN - NP

## 2022-05-27 ENCOUNTER — APPOINTMENT (OUTPATIENT)
Dept: GENERAL RADIOLOGY | Age: 67
DRG: 165 | End: 2022-05-27
Attending: THORACIC SURGERY (CARDIOTHORACIC VASCULAR SURGERY)
Payer: MEDICARE

## 2022-05-27 VITALS
HEIGHT: 68 IN | SYSTOLIC BLOOD PRESSURE: 144 MMHG | RESPIRATION RATE: 21 BRPM | HEART RATE: 70 BPM | OXYGEN SATURATION: 97 % | WEIGHT: 162.6 LBS | TEMPERATURE: 98.9 F | DIASTOLIC BLOOD PRESSURE: 75 MMHG | BODY MASS INDEX: 24.64 KG/M2

## 2022-05-27 LAB
EKG ATRIAL RATE: 66 BPM
EKG ATRIAL RATE: 71 BPM
EKG P AXIS: 18 DEGREES
EKG P AXIS: 22 DEGREES
EKG P-R INTERVAL: 168 MS
EKG P-R INTERVAL: 178 MS
EKG Q-T INTERVAL: 442 MS
EKG Q-T INTERVAL: 444 MS
EKG QRS DURATION: 106 MS
EKG QRS DURATION: 110 MS
EKG QTC CALCULATION (BAZETT): 463 MS
EKG QTC CALCULATION (BAZETT): 482 MS
EKG R AXIS: -30 DEGREES
EKG R AXIS: -33 DEGREES
EKG T AXIS: 4 DEGREES
EKG T AXIS: 9 DEGREES
EKG VENTRICULAR RATE: 66 BPM
EKG VENTRICULAR RATE: 71 BPM
LV EF: 62 %
LVEF MODALITY: NORMAL

## 2022-05-27 PROCEDURE — APPNB30 APP NON BILLABLE TIME 0-30 MINS: Performed by: NURSE PRACTITIONER

## 2022-05-27 PROCEDURE — 2580000003 HC RX 258: Performed by: PHYSICIAN ASSISTANT

## 2022-05-27 PROCEDURE — 6370000000 HC RX 637 (ALT 250 FOR IP): Performed by: THORACIC SURGERY (CARDIOTHORACIC VASCULAR SURGERY)

## 2022-05-27 PROCEDURE — 6370000000 HC RX 637 (ALT 250 FOR IP): Performed by: STUDENT IN AN ORGANIZED HEALTH CARE EDUCATION/TRAINING PROGRAM

## 2022-05-27 PROCEDURE — 6370000000 HC RX 637 (ALT 250 FOR IP): Performed by: PHYSICIAN ASSISTANT

## 2022-05-27 PROCEDURE — 6370000000 HC RX 637 (ALT 250 FOR IP): Performed by: NURSE PRACTITIONER

## 2022-05-27 PROCEDURE — 71045 X-RAY EXAM CHEST 1 VIEW: CPT

## 2022-05-27 PROCEDURE — 93306 TTE W/DOPPLER COMPLETE: CPT

## 2022-05-27 PROCEDURE — 6370000000 HC RX 637 (ALT 250 FOR IP): Performed by: INTERNAL MEDICINE

## 2022-05-27 RX ORDER — LISINOPRIL 10 MG/1
10 TABLET ORAL DAILY
Qty: 30 TABLET | Refills: 3 | Status: SHIPPED | OUTPATIENT
Start: 2022-05-28

## 2022-05-27 RX ADMIN — APIXABAN 5 MG: 5 TABLET, FILM COATED ORAL at 13:33

## 2022-05-27 RX ADMIN — OXYCODONE HYDROCHLORIDE AND ACETAMINOPHEN 1 TABLET: 5; 325 TABLET ORAL at 03:19

## 2022-05-27 RX ADMIN — BISACODYL 5 MG: 5 TABLET, COATED ORAL at 08:47

## 2022-05-27 RX ADMIN — METOPROLOL TARTRATE 25 MG: 25 TABLET ORAL at 08:47

## 2022-05-27 RX ADMIN — LISINOPRIL 10 MG: 10 TABLET ORAL at 08:47

## 2022-05-27 RX ADMIN — OXYCODONE HYDROCHLORIDE AND ACETAMINOPHEN 1 TABLET: 5; 325 TABLET ORAL at 06:37

## 2022-05-27 RX ADMIN — FAMOTIDINE 20 MG: 20 TABLET, FILM COATED ORAL at 08:47

## 2022-05-27 RX ADMIN — AMIODARONE HYDROCHLORIDE 200 MG: 200 TABLET ORAL at 08:47

## 2022-05-27 RX ADMIN — HYDROCHLOROTHIAZIDE 12.5 MG: 25 TABLET ORAL at 08:47

## 2022-05-27 RX ADMIN — SODIUM CHLORIDE, PRESERVATIVE FREE 10 ML: 5 INJECTION INTRAVENOUS at 08:48

## 2022-05-27 ASSESSMENT — PAIN SCALES - GENERAL
PAINLEVEL_OUTOF10: 2
PAINLEVEL_OUTOF10: 2
PAINLEVEL_OUTOF10: 5
PAINLEVEL_OUTOF10: 2
PAINLEVEL_OUTOF10: 5

## 2022-05-27 ASSESSMENT — PAIN DESCRIPTION - ORIENTATION
ORIENTATION: LEFT

## 2022-05-27 ASSESSMENT — PAIN DESCRIPTION - PAIN TYPE
TYPE: SURGICAL PAIN
TYPE: SURGICAL PAIN

## 2022-05-27 ASSESSMENT — PAIN DESCRIPTION - LOCATION
LOCATION: FLANK

## 2022-05-27 ASSESSMENT — PAIN DESCRIPTION - DESCRIPTORS
DESCRIPTORS: ACHING

## 2022-05-27 NOTE — PROGRESS NOTES
[facility]  Progress Note                   Date:   5/27/2022  Patient name: Desean Romero  Date of admission:  5/23/2022  5:21 AM  MRN:   [id number]  YOB: 1955  PCP: Bigg Cowan MD    Reason for Admission: Lung nodule [R91.1]    Subjective:       Clinical Changes /Abnormalities:  Patient seen and examined in ECHO lab. Denies chest pain or SOB. ECHO results pending. S/p left robotic lobectomy POD #4    Review of Systems    Medications:   Scheduled Meds:   amiodarone  200 mg Oral BID    metoprolol tartrate  25 mg Oral BID    lisinopril  10 mg Oral Daily    lidocaine  1 patch TransDERmal Daily    sodium chloride flush  5-40 mL IntraVENous 2 times per day    bisacodyl  5 mg Oral Daily    famotidine  20 mg Oral BID    Or    famotidine (PEPCID) injection  20 mg IntraVENous BID    hydroCHLOROthiazide  12.5 mg Oral Daily     Continuous Infusions:   sodium chloride       CBC: No results for input(s): WBC, HGB, PLT in the last 72 hours. BMP:  No results for input(s): NA, K, CL, CO2, BUN, CREATININE, GLUCOSE in the last 72 hours. Hepatic:No results for input(s): AST, ALT, ALB, BILITOT, ALKPHOS in the last 72 hours. Troponin: No results for input(s): TROPHS in the last 72 hours. BNP: No results for input(s): BNP in the last 72 hours. Lipids: No results for input(s): CHOL, HDL in the last 72 hours. Invalid input(s): LDLCALCU  INR: No results for input(s): INR in the last 72 hours. DATA:    Diagnostics:    EKG: Afib with RVR,   ECHO: Ordered/pending    Objective:   Vitals: BP (!) 144/75   Pulse 70   Temp 98.9 °F (37.2 °C) (Oral)   Resp 21   Ht 5' 8\" (1.727 m)   Wt 162 lb 9.6 oz (73.8 kg)   SpO2 97%   BMI 24.72 kg/m²   General appearance: alert and cooperative with exam  HEENT: Head: Normocephalic, no lesions, without obvious abnormality.   Neck:no JVD, trachea midline, no adenopathy  Lungs: Clear to auscultation  Heart: Regular rate and rhythm, s1/s2 auscultated, no murmurs. Abdomen: soft, non-tender, bowel sounds active  Extremities: no edema  Neurologic: not done        Assessment / Acute Cardiac Problems:   1.  A. fib with RVR which is new onset, Chads Vasc of 2. Currently back in NSR and on Amiodarone drip. Patient is having PACs. 2.  Left lower lobe lung adenocarcinoma diagnosed in April 2022 status post robotic LLL lobectomy and lymph node dissection. 3.  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 4. HTN. 5. HLD. Patient Active Problem List:     Hyperlipidemia, mixed     Essential hypertension     Oropharyngeal cancer ([facility] [address]     Primary malignant neoplasm of left lower lobe of lung ([address]     Lung nodule      Plan of Treatment:   1. Awaiting results of ECHO completed this am  2. Continue BB, ACE, HCTZ  3. SR on tele Continue PO amiodarone  4. AC okay per CTS  Will order Eliquis  5. Keep K > 4.0 and Mag > 2.0  K 4.7  Will order Mag lab  6. If ECHO low risk will be okay for discharge from cardiology stand point.   Follow up in 2 weeks OP    Electronically signed by OVIDIO Vital NP on 5/27/2022 at 10:25 [address] [place] [address].  [phone]

## 2022-05-27 NOTE — ADDENDUM NOTE
Addendum  created 05/27/22 0828 by OVIDIO Orona CRNA    Attestation recorded in [facility], [address] [facility] accepted, [facility] Attestations filed

## 2022-05-27 NOTE — PROGRESS NOTES
CLINICAL PHARMACY NOTE: MEDS TO BEDS    Total # of Prescriptions Filled: 2   The following medications were delivered to the patient:  · Metoprolol Tartrate 25mg  · Lisinopril 10mg    Additional Documentation: delivered to patient in room 2022 5/27 at 12:22pm. No co-pay.

## 2022-05-27 NOTE — PROGRESS NOTES
Physical Therapy        Physical Therapy Cancel Note      DATE: 2022    NAME: Jules Thomson  MRN: [id number]   : 1955      Patient not seen this date for Physical Therapy due to:    Patient Declined: \"I don't really want to. I am getting up and around by myself and I want to get out of here today. \"  RN present for refusal.      Electronically signed by Marlene Dominguez PTA on 2022 at 8:45 AM

## 2022-05-27 NOTE — CARE COORDINATION
Met with pt to discuss transitional planning. He is returning home independently.  He denies needs and has transportation    Discharge [facility] Case Management Department  Written by: Morenita Nick RN    Patient Name: Richard Marquez  Attending Provider: Antoinette Coronado MD  Admit Date: 2022  5:21 AM  MRN: [id number]  Account: [de-identified]                     : 1955  Discharge Date: 2022      Disposition: home    Morenita Nick RN

## 2022-05-27 NOTE — PLAN OF CARE
Problem: Discharge Planning  Goal: Discharge to home or other facility with appropriate resources  5/26/2022 2135 by Mike Baez RN  Outcome: Progressing  5/26/2022 0840 by Mike Nicholson RN  Outcome: Progressing     Problem: Pain  Goal: Verbalizes/displays adequate comfort level or baseline comfort level  5/26/2022 2135 by Mike Baez RN  Outcome: Progressing  5/26/2022 0840 by Mike Nicholson RN  Outcome: Progressing     Problem: Safety - Adult  Goal: Free from fall injury  5/26/2022 2135 by Mike Baez RN  Outcome: Progressing  5/26/2022 0840 by Mike Nicholson RN  Outcome: Progressing     Problem: ABCDS Injury Assessment  Goal: Absence of physical injury  5/26/2022 2135 by Mike Baez RN  Outcome: Progressing  5/26/2022 0840 by Mike Nicholson RN  Outcome: Progressing

## 2022-05-27 NOTE — PROGRESS NOTES
Patient discharged home with wife. Meds and belonging with patient. No further questions at his time!

## 2022-05-27 NOTE — PROGRESS NOTES
CLINICAL PHARMACY NOTE: MEDS TO BEDS    Total # of Prescriptions Filled: 2   The following medications were delivered to the patient:  · Amiodarone  · eliquis    Additional Documentation:  Late add-ons filled after other m2b delivery

## 2022-05-31 ENCOUNTER — CARE COORDINATION (OUTPATIENT)
Dept: CASE MANAGEMENT | Age: 67
End: 2022-05-31

## 2022-05-31 DIAGNOSIS — C34.32 PRIMARY MALIGNANT NEOPLASM OF LEFT LOWER LOBE OF LUNG (HCC): Primary | ICD-10-CM

## 2022-05-31 PROCEDURE — 1111F DSCHRG MED/CURRENT MED MERGE: CPT | Performed by: FAMILY MEDICINE

## 2022-05-31 ASSESSMENT — ENCOUNTER SYMPTOMS
RESPIRATORY NEGATIVE: 1
ALLERGIC/IMMUNOLOGIC NEGATIVE: 1
EYES NEGATIVE: 1
GASTROINTESTINAL NEGATIVE: 1

## 2022-05-31 NOTE — DISCHARGE SUMMARY
[facility] Cardiothoracic Surgery  Discharge Summary    Patient's Name/Date of Birth: Allison Burns / 1955 (68 y.o.)    Admission Date: 5/23/2022  5:21 AM  Discharge Date: 5/27/2022  2:00 PM  Discharge Physician: Dr. Betty Morris  Discharge Unit: CAR1  Discharge condition: Stable  Disposition: Home          HPI: Allison Burns is a 79 y.o.  male who presented outpatient for left lower lobe lobectomy with lymph node dissection after cancer diagnosis was made. Patient had uneventful postop recovery. No issues with pneumothorax. Chest tubes removed at appropriate date and time. Delay in discharge due to patient having episode of A. fib RVR. Cardiology was consulted for further evaluation and clearance. It was determined that further work-up would be made outpatient. Patient was converted out of A. fib started on some new medication. Procedures completed in hospital:Robotic left lower lobe lobectomy. 2.  Lymph node dissection. 3.  Platelet gel. Review of Systems   Constitutional: Negative. HENT: Negative. Eyes: Negative. Respiratory: Negative. Cardiovascular: Negative. Gastrointestinal: Negative. Endocrine: Negative. Genitourinary: Negative. Musculoskeletal: Negative. Skin: Negative. Allergic/Immunologic: Negative. Neurological: Negative. Physical Exam:  Vitals:    05/27/22 0730   BP: (!) 144/75   Pulse: 70   Resp: 21   Temp: 98.9 °F (37.2 °C)   SpO2: 97%     Weight: Weight: 162 lb 9.6 oz (73.8 kg)    Weight: 170 lb (77.1 kg)    No intake/output data recorded. General: alert and oriented to person, place and time, well-developed and well-nourished, in no acute distress. Up in chair, No apparent distress. Heart:Normal S1 and S2.  Regular rhythm. No murmurs, gallops, or rubs.   Pacing Wires no  Lungs: clear to auscultation bilaterally  Abdomen: soft, non tender, non distended, BSx4  Extremities: negative  Wounds: clean and dry, healing appropriately. Past Medical History:   Diagnosis Date    Cancer [facility])     Tonsilar cancer. ENT -Dr Tefsaye Buenrostro ,   [address] [facility]     PCP- Susi Grimaldo-  seen  2021    Hyperlipidemia     Hypertension     Lung cancer [facility])      Past Surgical History:   Procedure Laterality Date    ACHILLES TENDON SURGERY      about 30 years ago     COLONOSCOPY      CT NEEDLE BIOPSY LUNG PERCUTANEOUS  2022    CT NEEDLE BIOPSY LUNG PERCUTANEOUS 2022 [facility] CT SCAN    LOBECTOMY Left 2022    robotic video assisted thoracoscoopy, lower lobectomy with lymphnode dissection    LUNG REMOVAL, TOTAL Left 2022    XI ROBOTIC VIDEO ASSISTED THORACOSCOPY, LOWER LOBECTOMY performed by Cynthia Thorne MD at [address] [facility],[address] Left 2022     Allergies   Allergen Reactions    Seasonal Other (See Comments)     Runny nose scratchy throat     Family History   Problem Relation Age of Onset    Coronary Art Dis Mother     Cancer Father      Social History     Socioeconomic History    Marital status:      Spouse name: Not on file    Number of children: Not on file    Years of education: Not on file    Highest education level: Not on file   Occupational History    Not on file   Tobacco Use    Smoking status: Former Smoker     Types: Cigarettes     Quit date: 3/30/2022     Years since quittin.1    Smokeless tobacco: Never Used   Vaping Use    Vaping Use: Never used   Substance and Sexual Activity    Alcohol use: Yes     Comment: socially     Drug use: Not Currently    Sexual activity: Yes     Partners: Female   Other Topics Concern    Not on file   Social History Narrative    Not on file     Social Determinants of Health     Financial Resource Strain:     Difficulty of Paying Living Expenses: Not on file   Food Insecurity:     Worried About Running Out of Food in the Last Year: Not on file    Judi of Food in the Last Year: Not on file   Transportation Needs:     Lack of Transportation (Medical): Not on file    Lack of Transportation (Non-Medical): Not on file   Physical Activity:     Days of Exercise per Week: Not on file    Minutes of Exercise per Session: Not on file   Stress:     Feeling of Stress : Not on file   Social Connections:     Frequency of Communication with Friends and Family: Not on file    Frequency of Social Gatherings with Friends and Family: Not on file    Attends Zoroastrian Services: Not on file    Active Member of [address] [place] [address] or Organizations: Not on file    Attends Club or Organization Meetings: Not on file    Marital Status: Not on file   Intimate Partner Violence:     Fear of Current or Ex-Partner: Not on file    Emotionally Abused: Not on file    Physically Abused: Not on file    Sexually Abused: Not on file   Housing Stability:     Unable to Pay for Housing in the Last Year: Not on file    Number of [place] in the Last Year: Not on file    Unstable Housing in the Last Year: Not on file          Medication List      START taking these medications    amiodarone 200 MG tablet  Commonly known as: CORDARONE  Take 1 tablet by mouth 2 times daily     apixaban 5 MG Tabs tablet  Commonly known as: ELIQUIS  Take 1 tablet by mouth 2 times daily     famotidine 20 MG tablet  Commonly known as: PEPCID  Take 1 tablet by mouth 2 times daily     lidocaine 4 % external patch  Place 1 patch onto the skin daily     metoprolol tartrate 25 MG tablet  Commonly known as: LOPRESSOR  Take 1 tablet by mouth 2 times daily     oxyCODONE-acetaminophen 5-325 MG per tablet  Commonly known as: PERCOCET  Take 1 tablet by mouth every 8 hours as needed for Pain for up to 7 days.         CHANGE how you take these medications    lisinopril 10 MG tablet  Commonly known as: PRINIVIL;ZESTRIL  Take 1 tablet by mouth daily  What changed:   · medication strength  · how much to take        CONTINUE taking these medications    aspirin 81 MG chewable tablet     hydroCHLOROthiazide 12.5 MG tablet  Commonly known as: HYDRODIURIL  TAKE 1 TABLET BY MOUTH  DAILY     omeprazole 20 MG delayed release capsule  Commonly known as: PRILOSEC     simvastatin 20 MG tablet  Commonly known as: ZOCOR  TAKE 1 TABLET BY MOUTH AT  NIGHT     therapeutic multivitamin-minerals tablet        STOP taking these medications    acetaminophen-codeine 300-30 MG per tablet  Commonly known as: TYLENOL #3           Where to Get Your Medications      These medications were sent to [address] [facility], [address], [address]  [address], [address] [facility] [address]    Phone: [phone]   · amiodarone 200 MG tablet     These medications were sent to [facility] [address] [facility], [address]  [address] [facility], [facility] [address]    Phone: [phone]   · apixaban 5 MG Tabs tablet  · famotidine 20 MG tablet  · lidocaine 4 % external patch  · lisinopril 10 MG tablet  · metoprolol tartrate 25 MG tablet  · oxyCODONE-acetaminophen 5-325 MG per tablet           Data:    CBC: No results for input(s): WBC, HGB, HCT, MCV, PLT in the last 72 hours. BMP: No results for input(s): NA, K, CL, CO2, PHOS, BUN, CREATININE, CA, MG in the last 72 hours. Accucheck Glucoses:   No results for input(s): POCGLU in the last 72 hours. Cardiac Enzymes: No results for input(s): CKTOTAL, CKMB, CKMBINDEX, TROPONINI in the last 72 hours. PTT/PT/INR:   No results for input(s): PROTIME, INR in the last 72 hours. No results for input(s): APTT in the last 72 hours.   Liver Profile:  Lab Results   Component Value Date    AST 17 05/09/2022    ALT 14 05/09/2022    BILITOT 1.00 05/09/2022    ALKPHOS 85 05/09/2022   No results found for: CHOL, HDL, TRIG  TSH:   Lab Results   Component Value Date    TSH 1.07 03/29/2022     UA:   Lab Results   Component Value Date    COLORU Yellow 05/09/2022    PHUR 7.0 05/09/2022    SPECGRAV 1.017 05/09/2022 LEUKOCYTESUR NEGATIVE 05/09/2022    UROBILINOGEN Normal 05/09/2022    BILIRUBINUR NEGATIVE 05/09/2022    GLUCOSEU NEGATIVE 05/09/2022       Problem List Items Addressed This Visit     None      Visit Diagnoses     Post-op pain    -  Primary    Relevant Medications    oxyCODONE-acetaminophen (PERCOCET) 5-325 MG per tablet        Discharge Plan:  Follow up with CT Surgery  in 1 week. Call office at [phone] for any problems. Follow up with PCP and cardiology in 1-2 weeks.          OVIDIO Chung - NP, CNP  Phone: [phone]

## 2022-05-31 NOTE — CARE COORDINATION
Request from [address] [place] [address]. , please schedule patient for hospital f/u    Spoke with patient's PCP office,   Dr. Darin Tee has retired, patient needs to be scheduled as NP with someone else in the office. Called patient, he did realize the Darin Tee may be retiring and has not seen anyone at that office for almost a year. Patient states he will speak with his oncologist about a new PCP. He was not at all receptive to this writers help with scheduling. Patient does have upcoming appt with surgeon on 6/9 and upcoming Oncology appt. Information above conveyed to CTN.     Tamara Mckeon, [address]  Care Coordination Transition

## 2022-05-31 NOTE — CARE COORDINATION
[facility] Transitions Initial Follow Up Call    Call within 2 business days of discharge: Yes    Patient: Tara Kaur   Patient : 1955   MRN: [id number]    Reason for Admission: squamous cell CA - left lung - Left Lower Lobectomy, new AF/RVR   Discharge Date: 22   RARS: Readmission Risk Score: 6.4 ( )      Last Discharge [facility]       Complaint Diagnosis Description Type Department Provider    22  [facility]             Facility: [facility]    Non-face-to-face services provided:  Scheduled appointment with PCP-routed to CT support  Scheduled appointment with Specialist-cardio - 6/15, post op -   Obtained and reviewed discharge summary and/or continuity of care documents     Spoke with: patient - reports \"doing better each day. \"  Some incisional & prior chest tube site discomfort - \"hard getting in a comfortable position at night\" - took one dose of percocet so far, but will plan to take one at HS if night-time discomfort persists. 3 incision areas on back & left side are covered with steri-strips - no drainage or redness. Eating & taking fluids well. Bowels moving post op. Up & around home. Using inspirometer as instructed. Reviewed medications with patient - taking all newly prescribed medications. He's not sure of the eliquis price - I checked with [facility] - [facility] - confirmed that a one time coupon was used this time, so the next month refill can be run through insurance in about 2 weeks to check on this. Patient encouraged to check with cardiology at follow up 6/15 for some samples in the meantime. Reviewed appt scheduling with patient - cardio Dr Grossman Spotted [facility] 6/15 10:30 & Dr Clifton Bruce - post op appt -  10:30. Informed patient that PCP hospital f/u needs to be within 7 days of discharge - routed to CT support for assistance.      Informed of Care Transition & CTN contact #      Plan for next call: symptom management-reassess post op symptoms/discomfort, check incisional pain, surgical site, check deep breathing after lobectomy  follow up appointment-check PCP appt timing (routed to CT support) - already has cardio appt 6/15 2:30,  - [place]  medication management-check price of eliquis - patient is planning to run next month's script to check on     Care Transitions 24 Hour Call    Do you have a copy of your discharge instructions?: Yes  Do you have all of your prescriptions and are they filled?: Yes  Have you been contacted by a [address]?: No  Have you scheduled your follow up appointment?: Yes  How are you going to get to your appointment?: Car - family or friend to transport  Do you feel like you have everything you need to keep you well at home?: Yes  Care Transitions Interventions           Was this an external facility discharge? No     Challenges to be reviewed by the provider   Additional needs identified to be addressed with provider: No  none             Method of communication with provider : none    Advance Care Planning:   Does patient have an Advance Directive: not on file. Care Transition Nurse contacted the patient by telephone to perform post hospital discharge assessment. Verified name and  with patient as identifiers. Provided introduction to self, and explanation of the CTN role. CTN reviewed discharge instructions, medical action plan and red flags with patient who verbalized understanding. Patient given an opportunity to ask questions and does not have any further questions or concerns at this time. Were discharge instructions available to patient? Yes. Reviewed appropriate site of care based on symptoms and resources available to patient including: PCP  Specialist  CTN. The patient agrees to contact the PCP office for questions related to their healthcare.      Medication reconciliation was performed with patient, who verbalizes understanding of administration of home medications. Advised obtaining a 90-day supply of all daily and as-needed medications. Was patient discharged with a pulse oximeter? no    CTN provided contact information. Plan for follow-up call in 5-7 days based on severity of symptoms and risk factors.     Plan for next call: symptom management-reassess post op symptoms/discomfort, check incisional pain, surgical site, check deep breathing after lobectomy  follow up appointment-check PCP appt timing (routed to CT support) - already has cardio appt 6/15 2:30, 6/16 - [place]  medication management-check price of eliquis - patient is planning to run next month's script to check on         Follow Up  Future Appointments   Date Time Provider Avril Castro   6/9/2022 10:30 AM Daniela Michael MD SV CT Surg [facility]   6/16/2022  9:30 AM SCHEDULE, LEONARDO [facility] RAD ONC NURSE LEONARDO PB [place]   6/16/2022 10:00 AM Davon oWlfe MD [facility] CANCER [facility]       Maryjo Godoy RN

## 2022-06-06 RX ORDER — AMIODARONE HYDROCHLORIDE 200 MG/1
TABLET ORAL
Qty: 60 TABLET | Refills: 11 | OUTPATIENT
Start: 2022-06-06

## 2022-06-07 ENCOUNTER — CARE COORDINATION (OUTPATIENT)
Dept: CASE MANAGEMENT | Age: 67
End: 2022-06-07

## 2022-06-07 NOTE — CARE COORDINATION
[facility] Follow Up Call    2022    Patient: Elsie Jackson  Patient : 1955   MRN: [id number]  Reason for Admission: Left Lung Ca, S/P LLL Lobectomy  Discharge Date: 22 RARS: Readmission Risk Score: 6.4 ( )       Message left in compliance with HIPPA. Stated who I was, representing the [facility], Care Transitions Team. Requested to place a call to their Physician with any immediate health needs/questions/concerns.       Follow Up  Future Appointments   Date Time Provider Avril Castro   2022 10:30 AM Yevgeniy Blair MD SV CT Surg [facility]   2022  9:30 AM SCHEDULE, LEONARDO [facility]LDON RAD ONC NURSE LEONARDO SANCHEZ [facility] [place]   2022 10:00 AM Cecilia Castellanos MD [address] [facility] [address], RN

## 2022-06-08 ENCOUNTER — CARE COORDINATION (OUTPATIENT)
Dept: CASE MANAGEMENT | Age: 67
End: 2022-06-08

## 2022-06-08 NOTE — CARE COORDINATION
[facility] Trans[facility] Follow Up Call    2022    Patient: Arturo Lin    Patient : 1955   MRN: [id number]    Reason for Admission:  squamous cell CA - left lung - Left Lower Lobectomy, new AF/RVR post op   Discharge Date: 22   RARS: Readmission Risk Score: 6.4 ( )       Spoke with: patient - continues to report doing \"better each day\"  Up & around - takes walks to stay active, but takes adequate rest periods as well. Still some post op \"soreness\" on left side. Denies any difficulty at all with breathing or taking deep breaths. No expectorant. Patient found out that with his insurance - eliquis is approx $125 per month, but would be higher cost if his medications total $4400 per year. He plans to discuss with cardiologist at 6/15. He's not sure how long he'll be on it since AF was new for him post op. He said he couldn't really tell a difference when he was in AF, so he's uncertain if he has been in it since immediately post op. Continues on eliquis + amiodarone. Will plan to contact patient again after his several appts next week - CT surgery, cardiology, oncology & radiation oncology.       Plan for next call: symptom management-reassess post op discomfort  follow up appointment-review post op appt, cardio appt & oncology appts    Care Transitions Subsequent and Final Call    Schedule Follow Up Appointment with PCP: Completed  Subsequent and Final Calls  Do you have any ongoing symptoms?: Yes  Interventions for patient-reported symptoms: Other  Have your medications changed?: No  Do you have any questions related to your medications?: No  Do you currently have any active services?: No  Do you have any needs or concerns that I can assist you with?: No  Care Transitions Interventions  Other Interventions:               Needs to be reviewed by the provider   Additional needs identified to be addressed with provider: No  none             Method of communication with provider : none      Care Transition Nurse contacted the patient by telephone to follow up after admission. Verified name and  with patient as identifiers. Addressed changes since last contact: none  Discussed follow-up appointments. If no appointment was previously scheduled, appointment scheduling offered: Yes. Is follow up appointment scheduled within 7 days of discharge? No. Patient declined PCP appt prior to his post op appt with surgeon on       CTN reviewed discharge instructions, medical action plan and red flags with patient and discussed any barriers to care and/or understanding of plan of care after discharge. Discussed appropriate site of care based on symptoms and resources available to patient including: PCP  Specialist  CTN. The patient agrees to contact the PCP office for questions related to their healthcare. Patients top risk factors for readmission: medical condition-post op Left lower lobectomy, squamous CA  multiple health system providers  Interventions to address risk factors: Scheduled appointment with Faraz Viera CV surgeon, onc , cardio 6/15 and Obtained and reviewed discharge summary and/or continuity of care documents      Non-[facility] follow up appointment(s): cardio 6/15  CTN provided contact information for future needs. Plan for follow-up call in 7-10 days based on severity of symptoms and risk factors.   Plan for next call: symptom management-reassess post op discomfort  follow up appointment-review post op appt, cardio appt & oncology appts      Follow Up  Future Appointments   Date Time Provider Avril Castro   2022 10:30 AM Lilian Nielsen MD SV CT Surg [facility]   2022  9:30 AM SCHEDULE, LEONARDO SHELDON RAD ONC NURSE [facility] Corinne Spore   2022 10:00 AM Sushant Sheridan MD [facility] CANCER Willem Geller RN

## 2022-06-09 ENCOUNTER — OFFICE VISIT (OUTPATIENT)
Dept: CARDIOTHORACIC SURGERY | Age: 67
End: 2022-06-09

## 2022-06-09 VITALS
WEIGHT: 166 LBS | BODY MASS INDEX: 25.16 KG/M2 | HEART RATE: 65 BPM | SYSTOLIC BLOOD PRESSURE: 139 MMHG | TEMPERATURE: 98.4 F | OXYGEN SATURATION: 97 % | HEIGHT: 68 IN | RESPIRATION RATE: 20 BRPM | DIASTOLIC BLOOD PRESSURE: 78 MMHG

## 2022-06-09 DIAGNOSIS — Z90.2 STATUS POST LOBECTOMY OF LUNG: Primary | ICD-10-CM

## 2022-06-09 PROCEDURE — 99024 POSTOP FOLLOW-UP VISIT: CPT | Performed by: NURSE PRACTITIONER

## 2022-06-09 NOTE — PROGRESS NOTES
[facility]  Office Visit      Subjective:  Mr. Bandar Soto is a 79 y.o. male status post left lower lobectomy via robotic. Patient has no chest pain shortness of breath nausea vomiting diarrhea fever chills. Patient is back to many of his activities on the issues. Patient is following up with cardiology regarding his episode of atrial fibrillation during hospitalization. Physical Exam  Vitals:  Vitals:    06/09/22 1032   BP: 139/78   Pulse:    Resp:    Temp:    SpO2:        General: Alert and Oriented x3. Ambulatory. No apparent distress. Chest:  No abnormality. Equal and symmetric expansion with respiration. Lungs:  Clear to auscultation. Cardiac:  Regular rate and rhythm without murmurs, rubs or gallops. Abdomen:  Soft, non-tender, normoactive bowel sounds. Extremities:  No edema. Intact pulses in all four extremities. Psychiatric: Mood and affect are appropriate.   Incision clean dry intact no discharge or bleeding    Current Medications:    Current Outpatient Medications:     metoprolol tartrate (LOPRESSOR) 25 MG tablet, Take 1 tablet by mouth 2 times daily, Disp: 60 tablet, Rfl: 3    lisinopril (PRINIVIL;ZESTRIL) 10 MG tablet, Take 1 tablet by mouth daily, Disp: 30 tablet, Rfl: 3    apixaban (ELIQUIS) 5 MG TABS tablet, Take 1 tablet by mouth 2 times daily, Disp: 120 tablet, Rfl: 4    amiodarone (CORDARONE) 200 MG tablet, Take 1 tablet by mouth 2 times daily, Disp: 30 tablet, Rfl: 1    famotidine (PEPCID) 20 MG tablet, Take 1 tablet by mouth 2 times daily, Disp: 60 tablet, Rfl: 3    lidocaine 4 % external patch, Place 1 patch onto the skin daily, Disp: 15 patch, Rfl: 0    simvastatin (ZOCOR) 20 MG tablet, TAKE 1 TABLET BY MOUTH AT  NIGHT, Disp: 90 tablet, Rfl: 3    omeprazole (PRILOSEC) 20 MG delayed release capsule, Take 20 mg by mouth daily Pt takes 10mg daily, Disp: , Rfl:     Multiple Vitamins-Minerals (THERAPEUTIC MULTIVITAMIN-MINERALS) tablet, Take 1 tablet by mouth daily, Disp: , Rfl:     aspirin 81 MG chewable tablet, Take 81 mg by mouth daily (Patient not taking: Reported on 5/23/2022), Disp: , Rfl:     hydroCHLOROthiazide (HYDRODIURIL) 12.5 MG tablet, TAKE 1 TABLET BY MOUTH  DAILY, Disp: 60 tablet, Rfl: 5    Past Surgical History:   Procedure Laterality Date    ACHILLES TENDON SURGERY      about 30 years ago     COLONOSCOPY      CT NEEDLE BIOPSY LUNG PERCUTANEOUS  4/6/2022    CT NEEDLE BIOPSY LUNG PERCUTANEOUS 4/6/2022 [facility] CT SCAN    LOBECTOMY Left 05/23/2022    robotic video assisted thoracoscoopy, lower lobectomy with lymphnode dissection    LUNG REMOVAL, TOTAL Left 5/23/2022    XI ROBOTIC VIDEO ASSISTED THORACOSCOPY, LOWER LOBECTOMY performed by Ulysses Rued, MD at [address] [facility] Left 05/23/2022       Social Hx: reports that he quit smoking about 2 months ago. His smoking use included cigarettes. He has never used smokeless tobacco.    Assessment & Plan:   Patient will follow up with cardiology on Wednesday for further management. Patient has had no episodes of chest pain shortness of breath  Follow-up with cardiothoracic surgery as needed.     [address] [facility], APRN - NP,APRN CNP

## 2022-06-16 ENCOUNTER — TELEPHONE (OUTPATIENT)
Dept: ONCOLOGY | Age: 67
End: 2022-06-16

## 2022-06-16 ENCOUNTER — HOSPITAL ENCOUNTER (OUTPATIENT)
Dept: RADIATION ONCOLOGY | Age: 67
Discharge: HOME | End: 2022-06-16
Attending: RADIOLOGY
Payer: MEDICARE

## 2022-06-16 ENCOUNTER — OFFICE VISIT (OUTPATIENT)
Dept: ONCOLOGY | Age: 67
End: 2022-06-16
Payer: MEDICARE

## 2022-06-16 VITALS
OXYGEN SATURATION: 100 % | TEMPERATURE: 97.4 F | HEART RATE: 45 BPM | SYSTOLIC BLOOD PRESSURE: 154 MMHG | DIASTOLIC BLOOD PRESSURE: 80 MMHG | RESPIRATION RATE: 16 BRPM | WEIGHT: 168.6 LBS | BODY MASS INDEX: 25.64 KG/M2

## 2022-06-16 VITALS
HEART RATE: 44 BPM | DIASTOLIC BLOOD PRESSURE: 79 MMHG | TEMPERATURE: 96.7 F | WEIGHT: 168 LBS | SYSTOLIC BLOOD PRESSURE: 157 MMHG | BODY MASS INDEX: 25.54 KG/M2

## 2022-06-16 DIAGNOSIS — C34.32 PRIMARY MALIGNANT NEOPLASM OF LEFT LOWER LOBE OF LUNG (HCC): Primary | ICD-10-CM

## 2022-06-16 DIAGNOSIS — C34.32 CANCER OF LOWER LOBE OF LEFT LUNG (HCC): ICD-10-CM

## 2022-06-16 DIAGNOSIS — C10.9 OROPHARYNGEAL CANCER (HCC): Primary | ICD-10-CM

## 2022-06-16 DIAGNOSIS — C34.32 PRIMARY MALIGNANT NEOPLASM OF LEFT LOWER LOBE OF LUNG (HCC): ICD-10-CM

## 2022-06-16 PROCEDURE — G8427 DOCREV CUR MEDS BY ELIG CLIN: HCPCS | Performed by: INTERNAL MEDICINE

## 2022-06-16 PROCEDURE — 99215 OFFICE O/P EST HI 40 MIN: CPT | Performed by: INTERNAL MEDICINE

## 2022-06-16 PROCEDURE — 1111F DSCHRG MED/CURRENT MED MERGE: CPT | Performed by: INTERNAL MEDICINE

## 2022-06-16 PROCEDURE — 1036F TOBACCO NON-USER: CPT | Performed by: INTERNAL MEDICINE

## 2022-06-16 PROCEDURE — 3017F COLORECTAL CA SCREEN DOC REV: CPT | Performed by: INTERNAL MEDICINE

## 2022-06-16 PROCEDURE — G8417 CALC BMI ABV UP PARAM F/U: HCPCS | Performed by: INTERNAL MEDICINE

## 2022-06-16 PROCEDURE — 99214 OFFICE O/P EST MOD 30 MIN: CPT | Performed by: RADIOLOGY

## 2022-06-16 PROCEDURE — 99211 OFF/OP EST MAY X REQ PHY/QHP: CPT | Performed by: INTERNAL MEDICINE

## 2022-06-16 PROCEDURE — 99212 OFFICE O/P EST SF 10 MIN: CPT | Performed by: RADIOLOGY

## 2022-06-16 PROCEDURE — 1123F ACP DISCUSS/DSCN MKR DOCD: CPT | Performed by: INTERNAL MEDICINE

## 2022-06-16 ASSESSMENT — PAIN SCALES - GENERAL: PAINLEVEL_OUTOF10: 3

## 2022-06-16 ASSESSMENT — PAIN DESCRIPTION - LOCATION: LOCATION: CHEST

## 2022-06-16 NOTE — TELEPHONE ENCOUNTER
AVS from 6/16/22     IR Port   Chemo in 2 weeks  Rchemo teach   RTc c2      *IR sched for 7/5  *AVs given to chemo shced for p/c once approved will get sched     PT was given AVS and appt schedule

## 2022-06-16 NOTE — TELEPHONE ENCOUNTER
Name: Guerline Plata  : 1955  MRN: [id number]    Oncology Navigation Follow-Up Note    Contact Type:  Medical Oncology  Notes: Pt @ [facility] for RO/MO f/u. Met with pt prior to Dr. Nate Spring f/u. Pt denied questions/concerns. Dr. Nate Spring completed f/u & alerted writer new order for chemotherapy placed. Instructed pt writer will follow & may contact prn. Will continue to follow.     Electronically signed by Noemi Castillo RN on 2022 at 10:46 AM

## 2022-06-16 NOTE — PROGRESS NOTES
Adore Collins  6/16/2022  9:32 AM      Vitals:    06/16/22 0915   BP: (!) 154/80   Pulse: (!) 45   Resp: 16   Temp: 97.4 °F (36.3 °C)   SpO2: 100%    :        Pain Level: 3       Wt Readings from Last 1 Encounters:   06/16/22 168 lb 9.6 oz (76.5 kg)                Current Outpatient Medications:     metoprolol tartrate (LOPRESSOR) 25 MG tablet, Take 1 tablet by mouth 2 times daily, Disp: 60 tablet, Rfl: 3    lisinopril (PRINIVIL;ZESTRIL) 10 MG tablet, Take 1 tablet by mouth daily, Disp: 30 tablet, Rfl: 3    apixaban (ELIQUIS) 5 MG TABS tablet, Take 1 tablet by mouth 2 times daily, Disp: 120 tablet, Rfl: 4    amiodarone (CORDARONE) 200 MG tablet, Take 1 tablet by mouth 2 times daily, Disp: 30 tablet, Rfl: 1    famotidine (PEPCID) 20 MG tablet, Take 1 tablet by mouth 2 times daily, Disp: 60 tablet, Rfl: 3    lidocaine 4 % external patch, Place 1 patch onto the skin daily (Patient not taking: Reported on 6/16/2022), Disp: 15 patch, Rfl: 0    aspirin 81 MG chewable tablet, Take 81 mg by mouth daily (Patient not taking: Reported on 5/23/2022), Disp: , Rfl:     simvastatin (ZOCOR) 20 MG tablet, TAKE 1 TABLET BY MOUTH AT  NIGHT, Disp: 90 tablet, Rfl: 3    hydroCHLOROthiazide (HYDRODIURIL) 12.5 MG tablet, TAKE 1 TABLET BY MOUTH  DAILY, Disp: 60 tablet, Rfl: 5    omeprazole (PRILOSEC) 20 MG delayed release capsule, Take 20 mg by mouth daily Pt takes 10mg daily, Disp: , Rfl:     Multiple Vitamins-Minerals (THERAPEUTIC MULTIVITAMIN-MINERALS) tablet, Take 1 tablet by mouth daily, Disp: , Rfl:                FALLS RISK SCREEN  Instructions:  Assess the patient and enter the appropriate indicators that are present for fall risk identification. Total the numbers entered and assign a fall risk score from Table 2.  Reassess patient at a minimum every 12 weeks or with status change. Assessment   Date  6/16/2022     1. Mental Ability: confusion/cognitively impaired 0     2.   Elimination Issues: incontinence, frequency 0 3.  Ambulatory: use of assistive devices (walker, cane, off-loading devices),        attached to equipment (IV pole, oxygen) 0     4. Sensory Limitations: dizziness, vertigo, impaired vision 0     5. Age less than 65        0     6. Age 72 or greater 0     7. Medication: diuretics, strong analgesics, hypnotics, sedatives,        antihypertensive agents 3   8. Falls:  recent history of falls within the last 3 months (not to include slipping or        tripping) 0   TOTAL 3    If score of 4 or greater was education given? No           TABLE 2   Risk Score Risk Level Plan of Care   0-3 Little or  No Risk 1. Provide assistance as indicated for ambulation activities  2. Reorient confused/cognitively impaired patient  3. Chair/bed in low position, stretcher/bed with siderails up except when performing patient care activities  5. Educate patient/family/caregiver on falls prevention  6.  Reassess in 12 weeks or with any noted change in patient condition which places them at a risk for a fall   4-6 Moderate Risk 1. Provide assistance as indicated for ambulation activities  2. Reorient confused/cognitively impaired patient  3. Chair/bed in low position, stretcher/bed with siderails up except when performing patient care activities  4. Educate patient/family/caregiver on falls prevention     7 or   Higher High Risk 1. Place patient in easily observable treatment room  2. Patient attended at all times by family member or staff  3. Provide assistance as indicated for ambulation activities  4. Reorient confused/cognitively impaired patient  5. Chair/bed in low position, stretcher/bed with siderails up except when performing patient care activities  6. Educate patient/family/caregiver on falls prevention         PLAN: Patient is seen today in follow up. He is feeling stronger every day since his lung surgery. He does have some post op pain that he does not take any medication for.   States taste is about 85% and has dry mouth that he uses Xylitol melts for that his helpful. Patient to see med onc after appt today. Denies any shortness of breath.           Gustabo Rinaldi RN

## 2022-06-16 NOTE — PROGRESS NOTES
Patient ID: Clint Mcguire, 1955, [id number], 79 y.o. Referred by : Pearl Ferrera MD  Diagnosis:   Oropharyngeal carcin shirley, left tonsil squamous cell carcinoma, p16 positive, clinical stage T2 a N0 M0, stage I disease     Cancer Staging  [facility]  Staging form: Pharynx - HPV-Mediated Oropharynx, AJCC 8th Edition  - Clinical stage from 10/12/2021: Stage I (cT2, cN0, cM0) - Signed by Anitha Arboleda MD on 10/12/2021    Primary malignant neoplasm of left lower lobe of lung ([facility]  Staging form: Lung, AJCC 8th Edition  - Clinical stage from 4/6/2022: Stage IA2 (cT1b, cN0, cM0) - Signed by Floyd Huertas MD on 4/13/2022    Patient has been evaluated by ENT surgical oncology at [facility] and as per the tumor board recommendation definitive radiation therapy was recommended   Patient started on definitive radiation therapy on 11/9/2021   He  completed RT on December 29, 2021  Left lUng adenocarcinoma: 3/28/2022 had a PET scan which showed resolution of uptake in the tonsil and no active lymph node in the cervical region however 1.9 cm left lower lobe nodule noted with FDG activity. Patient had CT-guided biopsy on 4/6/2022 which showed adenocarcinoma  On 5/23/2022 patient underwent left lower lobectomy which showed invasive lung acinar adenocarcinoma, poorly differentiated, measuring 2.8 cm, with invasion of the visceral pleura with extension to the pleural surface and margins were negative  HISTORY OF PRESENT ILLNESS:    Oncologic History:  Clint Mcguire is 79 y.o. male was seen during initial consultation with for newly diagnosed left tonsillar/oropharyngeal carcinoma. Patient initially presented with sore throat/throat pain in May of this year and was referred to ENT for further evaluation. His ENT evaluation did show 3 cm left tonsillar mass limited to tonsils only.   Patient had a CT of the neck on 9/22/2021 which showed 3.2 cm mass in the left palatine tonsil without any abnormal lymphadenopathy. Patient was evaluated by ENT and had a biopsy of the left tonsil which showed poorly differentiated invasive squamous cell carcinoma, p16 positive. Subsequently patient had a PET scan on 10/6/2021 which showed no distant metastasis and no lymph node metastasis noted. Patient has been evaluated by radiation oncology  He denies any unintentional weight loss, drenching night sweats fever chills. INTERVAL HISTORY  Patient is returning for follow-up Sadan to discuss lab results, surgical pathology results and further recommendations. He had left lower lobectomy on 5/23/2022 and recovering well. His pain is well controlled. He denies any fever chills. He denies any nausea vomiting. His surgical pathology showed visceropleural invasion and I discussed role of adjuvant systemic chemotherapy     During this visit patient's allergy, social, medical, surgical history and medications were reviewed and updated. Past Medical History:   Diagnosis Date    Cancer ([facility]     Tonsilar cancer. ENT -Dr Andujar [facility] maintenance     PCP- Tammy Tiwari-  seen  6/2021    Hyperlipidemia     Hypertension     Lung cancer [facility])        Past Surgical History:   Procedure Laterality Date    ACHILLES TENDON SURGERY      about 30 years ago     COLONOSCOPY      CT NEEDLE BIOPSY LUNG PERCUTANEOUS  4/6/2022    CT NEEDLE BIOPSY LUNG PERCUTANEOUS 4/6/2022 [facility] CT SCAN    LOBECTOMY Left 05/23/2022    robotic video assisted thoracoscoopy, lower lobectomy with lymphnode dissection    LUNG REMOVAL, TOTAL Left 5/23/2022    XI ROBOTIC VIDEO ASSISTED THORACOSCOPY, LOWER LOBECTOMY performed by Khai German MD at [place] [id number] Left 05/23/2022       Allergies   Allergen Reactions    Seasonal Other (See Comments)     Runny nose scratchy throat       Current Outpatient Medications   Medication Sig Dispense Refill    metoprolol tartrate (LOPRESSOR) 25 MG tablet Take 1 tablet by mouth 2 times daily 60 tablet 3    lisinopril (PRINIVIL;ZESTRIL) 10 MG tablet Take 1 tablet by mouth daily 30 tablet 3    apixaban (ELIQUIS) 5 MG TABS tablet Take 1 tablet by mouth 2 times daily 120 tablet 4    amiodarone (CORDARONE) 200 MG tablet Take 1 tablet by mouth 2 times daily 30 tablet 1    famotidine (PEPCID) 20 MG tablet Take 1 tablet by mouth 2 times daily 60 tablet 3    lidocaine 4 % external patch Place 1 patch onto the skin daily 15 patch 0    simvastatin (ZOCOR) 20 MG tablet TAKE 1 TABLET BY MOUTH AT  NIGHT 90 tablet 3    omeprazole (PRILOSEC) 20 MG delayed release capsule Take 20 mg by mouth daily Pt takes 10mg daily      Multiple Vitamins-Minerals (THERAPEUTIC MULTIVITAMIN-MINERALS) tablet Take 1 tablet by mouth daily      aspirin 81 MG chewable tablet Take 81 mg by mouth daily  (Patient not taking: Reported on 2022)      hydroCHLOROthiazide (HYDRODIURIL) 12.5 MG tablet TAKE 1 TABLET BY MOUTH  DAILY 60 tablet 5     No current facility-administered medications for this visit.        Social History     Socioeconomic History    Marital status:      Spouse name: Not on file    Number of children: Not on file    Years of education: Not on file    Highest education level: Not on file   Occupational History    Not on file   Tobacco Use    Smoking status: Former Smoker     Types: Cigarettes     Quit date: 3/30/2022     Years since quittin.2    Smokeless tobacco: Never Used   Vaping Use    Vaping Use: Never used   Substance and Sexual Activity    Alcohol use: Yes     Comment: socially     Drug use: Not Currently    Sexual activity: Yes     Partners: Female   Other Topics Concern    Not on file   Social History Narrative    Not on file     Social Determinants of Health     Financial Resource Strain:     Difficulty of Paying Living Expenses: Not on file   Food Insecurity:     Worried About [address] in the Last Year: Not on file    Judi of Food in the Last Year: Not on file   Transportation Needs: Lack of Transportation (Medical): Not on file    Lack of Transportation (Non-Medical): Not on file   Physical Activity:     Days of Exercise per Week: Not on file    Minutes of Exercise per Session: Not on file   Stress:     Feeling of Stress : Not on file   Social Connections:     Frequency of Communication with Friends and Family: Not on file    Frequency of Social Gatherings with Friends and Family: Not on file    Attends Nondenominational Services: Not on file    Active Member of Clubs or Organizations: Not on file    Attends Club or Organization Meetings: Not on file    Marital Status: Not on file   Intimate Partner Violence:     Fear of Current or Ex-Partner: Not on file    Emotionally Abused: Not on file    Physically Abused: Not on file    Sexually Abused: Not on file   Housing Stability:     Unable to Pay for Housing in the Last Year: Not on file    Number of [place] in the Last Year: Not on file    Unstable Housing in the Last Year: Not on file       Family History   Problem Relation Age of Onset    Coronary Art Dis Mother     Cancer Father         REVIEW OF SYSTEM:     Constitutional: No fever or chills. No night sweats, no weight loss   Eyes: No eye discharge, double vision, or eye pain   HEENT: negative for sore mouth, sore throat, hoarseness and voice change   Respiratory: negative for cough , sputum, dyspnea, wheezing, hemoptysis, chest pain   Cardiovascular: negative for chest pain, dyspnea, palpitations, orthopnea, PND   Gastrointestinal: negative for nausea, vomiting, diarrhea, constipation, abdominal pain, Dysphagia, hematemesis and hematochezia   Genitourinary: negative for frequency, dysuria, nocturia, urinary incontinence, and hematuria   Integument: negative for rash, skin lesions, bruises.    Hematologic/Lymphatic: negative for easy bruising, bleeding, lymphadenopathy, petechiae and swelling/edema   Endocrine: negative for heat or cold intolerance, tremor, weight changes, change in bowel habits and hair loss   Musculoskeletal: negative for myalgias, arthralgias, pain, joint swelling,and bone pain   Neurological: negative for headaches, dizziness, seizures, weakness, numbness       OBJECTIVE:         Vitals:    06/16/22 1004   BP: (!) 157/79   Pulse: (!) 44   Temp: (!) 96.7 °F (35.9 °C)       PHYSICAL EXAM:   General appearance - well appearing, no in pain or distress   Mental status - alert and cooperative   Eyes - pupils equal and reactive, extraocular eye movements intact   Ears - bilateral TM's and external ear canals normal   Mouth - mucous membranes moist, pharynx normal without lesions   Neck - supple, no significant adenopathy   Lymphatics - no palpable lymphadenopathy, no hepatosplenomegaly   Chest - clear to auscultation, no wheezes, rales or rhonchi, symmetric air entry   Heart - normal rate, regular rhythm, normal S1, S2, no murmurs, rubs, clicks or gallops   Abdomen - soft, nontender, nondistended, no masses or organomegaly   Neurological - alert, oriented, normal speech, no focal findings or movement disorder noted   Musculoskeletal - no joint tenderness, deformity or swelling   Extremities - peripheral pulses normal, no pedal edema, no clubbing or cyanosis   Skin - normal coloration and turgor, no rashes, no suspicious skin lesions noted ,      LABORATORY DATA:     Lab Results   Component Value Date    WBC 10.3 05/24/2022    HGB 13.2 05/24/2022    HCT 38.7 (L) 05/24/2022    MCV 89.0 05/24/2022     05/24/2022    LYMPHOPCT 5 (L) 05/24/2022    RBC 4.35 05/24/2022    MCH 30.3 05/24/2022    MCHC 34.1 05/24/2022    RDW 13.1 05/24/2022    MONOPCT 9 05/24/2022    BASOPCT 0 05/24/2022    NEUTROABS 8.85 (H) 05/24/2022    LYMPHSABS 0.52 (L) 05/24/2022    MONOSABS 0.93 05/24/2022    EOSABS 0.00 05/24/2022    BASOSABS 0.00 05/24/2022         Chemistry        Component Value Date/Time     05/24/2022 0336    K 4.7 05/24/2022 0336     05/24/2022 0336    CO2 24 05/24/2022 0336    BUN 16 right thyroid   lobe. Left lower lobe pulmonary nodule with increasing FDG avidity. This could   potentially represent a primary lung cancer or metastatic disease. Benign   etiologies remain a consideration       ASSESSMENT:    Lia Quintana is a 79 y.o. pleasant male  diagnosed oropharyngeal cancer, p16 positive, clinical stage T2 a N0 M0, stage I disease. Patient completed definitive radiation therapy and now was on surveillance and recent PET scan showed no evidence of recurrence. I reviewed the NCCN guidelines and goals of care with patient. Left lower lobe lung adenocarcinoma. ppeStatus post left lower lobectomy and final surgical pathology showing p T2 aN0 M0, stage I disease with visceral pleural involvement. Patient will be candidate for adjuvant systemic chemotherapy    During today's visit, the patient and the family had a number of reasonable questions which were answered to their satisfaction. They verbalized understanding of the information provided and they agreed to proceed as outlined above. PLAN:   I reviewed the laboratory data, surgical pathology results, diagnosis, prognosis and treatment recommendations  I reviewed the NCCN guidelines and goals of care with patient  I explained that surgical pathology showing visceral pleural involvement, poorly differentiated carcinoma which are the adverse risk feature and as per the NCCN guidelines adjuvant systemic chemotherapy will be recommended  I reviewed the risk benefits and side effects with patient and is agreeable  We will plan for port placement with IR  Chemotherapy plan in 2 weeks  Arrange for chemo teaching  Return to clinic with cycle 2 or earlier if needed    Charles Gilbert MD  Hematologist/Medical Oncologist    On this date 6/16/22  I have spent 40 minutes reviewing previous notes, test results and face to face with the patient discussing the diagnosis and importance of compliance with the treatment plan.  Greater than 50% of that time was spent face-to-face with the patient in counseling and coordinating her care. This note is created with the assistance of a speech recognition program.  While intending to generate a document that actually reflects the content of the visit, the document can still have some errors including those of syntax and sound a like substitutions which may escape proof reading. It such instances, actual meaning can be extrapolated by contextual diversion.     Brita Spatz, MD

## 2022-06-17 ENCOUNTER — CARE COORDINATION (OUTPATIENT)
Dept: CASE MANAGEMENT | Age: 67
End: 2022-06-17

## 2022-06-17 NOTE — PROGRESS NOTES
Physician Progress Note      Zac Gray  CSN #:                  [id number]  :                       1955  ADMIT DATE:       2022 5:21 AM  DISCH DATE:        2022 2:00 PM  RESPONDING  PROVIDER #:        Kishore Quinn MD          QUERY TEXT:    Patient admitted with Lung Ca with scheduled VATS LL lobectomy, noted to have   new onset atrial fibrillation postoperatively. If possible, please document in progress notes and discharge summary further   specificity regarding the type of atrial fibrillation: The medical record reflects the following:  Risk Factors: hx HTN, Lung Ca s/p VATS LL lobectomy   Clinical Indicators: new onset atrial fibrillation on ,  Afib w/ RVR,   converted back to NSR with amiodarone gtt  Chads Vasc of 2  Treatment: Cardiology consult, new medications at discharge amiodarone PO/   Eliquis, metoprolol    Thank you, please contact me for any questions! Castro Torres RN CDI Supervisor   [phone]    Paroxysmal - self-terminating or intermittent; resolves with or without   intervention within 7 days of onset; may recur with various frequency. Persistent - Fails to terminate within 7 days; Often requires meds or   cardioversion to restore to NSR. Definitions per [employer] Training Guide and Quick Reference Guide, Nelli Adamson 112 5   Diseases and Disorders of the Circulatory System; 2019; [employer]. Software content   from the [employer]? Advanced CDI Transformation Program  Options provided:  -- Postoperative Atrial Fibrillation as a complication of VATS procedure  -- Paroxysmal Atrial Fibrillation  -- Persistent Atrial Fibrillation  -- Other - I will add my own diagnosis  -- Disagree - Not applicable / Not valid  -- Disagree - Clinically unable to determine / Unknown  -- Refer to Clinical Documentation Reviewer    PROVIDER RESPONSE TEXT:    This patient has paroxysmal atrial fibrillation.     Query created by: Kenna Brandon on 6/10/2022 1:39 PM      Electronically signed by:  Kings Humphrey MD 6/17/2022 9:43 AM

## 2022-06-17 NOTE — PROGRESS NOTES
[address]            Radiation Oncology          [address] [facility]          Rayna Clark, [address]        Gisell Raiestine: [phone]        F: [phone]       BYOM!. [facility]       Radiation Oncology   [address] [facility], [address] [facility]       Gisell Raiestine: [phone]       F: [phone]       [email or web address]      Date of Service: 2022     Location:  [address] [place] [address],   [address] [facility]., Mandy Fernando   [phone]        RADIATION ONCOLOGY FOLLOW UP NOTE    Patient ID:   Kevin Gloria  : 1955   MRN: [id number]    DIAGNOSIS:  Cancer Staging  Oropharyngeal cancer ([facility]  Staging form: Pharynx - HPV-Mediated Oropharynx, AJCC 8th Edition  - Clinical stage from 10/12/2021: Stage I (cT2, cN0, cM0) - Signed by Fahad Anthony MD on 10/12/2021    Primary malignant neoplasm of left lower lobe of lung ([facility]  Staging form: Lung, AJCC 8th Edition  - Clinical stage from 2022: Stage IA2 (cT1b, cN0, cM0) - Signed by Antonio Habermann, MD on 2022      -s/p H&N RT 70Gy 21  -s/p LLL Lobectomy 22    INTERVAL HISTORY:   Kevin Gloria is a 79 y.o.. male with a history of a left tonsillar squamous cell carcinoma treated with a course of definitive radiation therapy completing approximately 6 months ago. Patient afterwards had a PET scan to show resolution of his disease and was noted to have a persistence of a left lower lobe lung nodule. Patient did have a biopsy on 2022 which came back positive for an adenocarcinoma. Given his age and lung function patient was ultimately referred for lobectomy which he had on May 23, 2022 which revealed a 2.8 cm tumor with visceral pleural extension though negative margins. Patient has healed well from surgery noting mild chest wall pain but otherwise is doing well. He denies any shortness of breath or coughing.   He denies any headaches, dizziness, trouble swallowing, pain in the throat or neck, swelling, abdominal pain, nausea, diarrhea, weight loss, bleeding, or fatigue. He still notes some changes in taste and dryness in the mouth but otherwise he saw Dr. Lachelle Bergeron recently as well who noted no issues on his endoscopic exam.       MEDICATIONS:    Current Outpatient Medications:     metoprolol tartrate (LOPRESSOR) 25 MG tablet, Take 1 tablet by mouth 2 times daily, Disp: 60 tablet, Rfl: 3    lisinopril (PRINIVIL;ZESTRIL) 10 MG tablet, Take 1 tablet by mouth daily, Disp: 30 tablet, Rfl: 3    apixaban (ELIQUIS) 5 MG TABS tablet, Take 1 tablet by mouth 2 times daily, Disp: 120 tablet, Rfl: 4    amiodarone (CORDARONE) 200 MG tablet, Take 1 tablet by mouth 2 times daily, Disp: 30 tablet, Rfl: 1    famotidine (PEPCID) 20 MG tablet, Take 1 tablet by mouth 2 times daily, Disp: 60 tablet, Rfl: 3    lidocaine 4 % external patch, Place 1 patch onto the skin daily, Disp: 15 patch, Rfl: 0    aspirin 81 MG chewable tablet, Take 81 mg by mouth daily  (Patient not taking: Reported on 2022), Disp: , Rfl:     simvastatin (ZOCOR) 20 MG tablet, TAKE 1 TABLET BY MOUTH AT  NIGHT, Disp: 90 tablet, Rfl: 3    hydroCHLOROthiazide (HYDRODIURIL) 12.5 MG tablet, TAKE 1 TABLET BY MOUTH  DAILY, Disp: 60 tablet, Rfl: 5    omeprazole (PRILOSEC) 20 MG delayed release capsule, Take 20 mg by mouth daily Pt takes 10mg daily, Disp: , Rfl:     Multiple Vitamins-Minerals (THERAPEUTIC MULTIVITAMIN-MINERALS) tablet, Take 1 tablet by mouth daily, Disp: , Rfl:     ALLERGIES:  Allergies   Allergen Reactions    Seasonal Other (See Comments)     Runny nose scratchy throat         REVIEW OF SYSTEMS:    A full 14 point review of systems was performed and assessed and found to be negative except as noted above.       PHYSICAL EXAMINATION:    CHAPERONE: Family/friend/companieon Present    ECO Asymptomatic    VITAL SIGNS: BP (!) 154/80   Pulse (!) 45   Temp 97.4 °F (36.3 °C) (Temporal) Resp 16   Wt 168 lb 9.6 oz (76.5 kg)   SpO2 100%   BMI 25.64 kg/m²    Wt Readings from Last 3 Encounters:   06/16/22 168 lb 9.6 oz (76.5 kg)   06/16/22 168 lb (76.2 kg)   06/09/22 166 lb (75.3 kg)     GENERAL:  General appearance is that of a well-nourished, well-developed in no apparent distress. HEENT: Normocephalic, atraumatic, EOMI, moist mucosa, no erythema. NECK:  No adenopathy or a palpable thyroid mass, trachea is midline. LYMPHATICS: No cervical, supraclavicular adenopathy. HEART:  Normal rate and regular rhythm, normal heart sounds. LUNGS:  Pulmonary effort normal. Normal breath sounds. ABDOMEN:  Soft, nontender, non distended. EXTREMITIES:  No edema. No calf tenderness. MSK: (+) L chest wall soreness. No spinal tenderness. Normal ROM. NEUROLOGICAL: Alert and oriented. Strength and sensation intact bilaterally. No focal deficits. PSYCH: Mood normal, behavior normal.  SKIN: No erythema, no desquamation.         LABS:  WBC   Date Value Ref Range Status   05/24/2022 10.3 3.5 - 11.3 k/uL Final     Segs Absolute   Date Value Ref Range Status   05/24/2022 8.85 (H) 1.50 - 8.10 k/uL Final     Hemoglobin   Date Value Ref Range Status   05/24/2022 13.2 13.0 - 17.0 g/dL Final     Platelets   Date Value Ref Range Status   05/24/2022 284 138 - 453 k/uL Final     No results found for: , CEA  No results found for: PSA    IMAGING:   3/28/22 PET Scan  Impression   NIRADS score for Neck Primary: 1: No evidence of recurrence: routine   surveillance, CECT       NIRADS score for Neck Nodes: 1: No evidence of recurrence: routine   surveillance.       There continues to be focal activity in a small nodule in the right thyroid   lobe.       Left lower lobe pulmonary nodule with increasing FDG avidity.  This could   potentially represent a primary lung cancer or metastatic disease.  Benign   etiologies remain a consideration           PATHOLOGY:  4/6/22 LLL Biopsy  -- Diagnosis --   LUNG, LEFT LOWER LOBE, CT-GUIDED CORE NEEDLE BIOPSY:        -  ADENOCARCINOMA, CONSISTENT WITH LUNG PRIMARY. 5/23/22 LLL Lobectomy  -- Diagnosis --   LUNG, LEFT LOWER LOBE, LOBECTOMY:   -LUNG INVASIVE ACINAR ADENOCARCINOMA, POORLY DIFFERENTIATED, SIZE 2.8   CM, WITH INVASION OF THE VISCERAL PLEURA WITH EXTENSION TO THE PLEURAL   SURFACE.       -MARGINS NEGATIVE.     -FIVE (5) LYMPH NODES NEGATIVE FOR CARCINOMA. ASSESSMENT AND PLAN:  Von Marks is a 79 y.o. male with a Cancer Staging  Oropharyngeal cancer ([facility]  Staging form: Pharynx - HPV-Mediated Oropharynx, AJCC 8th Edition  - Clinical stage from 10/12/2021: Stage I (cT2, cN0, cM0) - Signed by Marshall Vargas MD on 10/12/2021    Primary malignant neoplasm of left lower lobe of lung ([facility]  Staging form: Lung, AJCC 8th Edition  - Clinical stage from 4/6/2022: Stage IA2 (cT1b, cN0, cM0) - Signed by Ki Moon MD on 4/13/2022      Patient comes in today for a follow-up 6-month after completion of his definitive radiation therapy for his tonsillar cancer. Unfortunately patient at the time of PET follow-up to establish resolution of his tonsillar cancer was noted to have a left lower lobe lung nodule which ended up being a primary lung cancer. Patient had a lobectomy and has healed well from his surgery. He continues to recover from both his cancer treatments, and is therefore encouraged to continue close surveillance with his treating physicians. Patient will follow up with us in 6 months with a repeat CT of the chest.  We will encourage him to continue close follow-up with ENT and cardiothoracic surgery as well. Patient will also follow-up with medical oncology to discuss if any additional systemic therapy is warranted given his pathologic findings of visceral pleural involvement. Patient was in agreement with my recommendations. All questions were answered to their satisfaction. Patient was advised to contact us anytime should they have any questions or concerns. Electronically signed by Ryan Sanchez MD on 6/17/2022 at 11:37 AM        Medications Prescribed:   New Prescriptions    No medications on file       Orders:   Orders Placed This Encounter   Procedures    CT CHEST WO CONTRAST       CC:  Patient Care Team:  Ariela Singh MD as PCP - General (Family Medicine)  Ariela Singh MD as PCP - [facility] Empaneled Provider  Sarah Castillo, RN as Nurse Navigator (Oncology)  Rojelio Tadeo RN as Care Transitions Nurse     Total time spent on this case on the day of encounter is 30 minutes. This time includes combination of one or more of the following - review of necessary tests, review of pertinent medical records from the EMR, performing medically appropriate examination and evaluation, counseling and educating the patient/family/caregiver, ordering necessary medical tests, procedures etc., documenting the clinical information in the electronic medical record, care coordination, referring and communicating with other health care providers and interpretation of results independently. This note is created with the assistance of a speech recognition program.  While intending to generate a document that actually reflects the content of the visit, the document can still have some errors including those of syntax and sound a like substitutions which may escape proof reading. It such instances, actual meaning can be extrapolated by contextual diversion.

## 2022-06-17 NOTE — CARE COORDINATION
[facility] Transitions Final Follow Up Call    2022    Patient: Norman Wilson    Patient : 1955   MRN: [id number]    Reason for Admission: squamous cell CA - left lung - Left Lower Lobectomy, new AF/RVR post op   Discharge Date: 22   RARS: Readmission Risk Score: 6.4 ( )       Spoke with: patient & Carlee Cornejo, oncology navigator. Patient has had recent appts with Dr Maria Ines Oleary - oncology - plan is for port next month, then chemo. Recent appt with CT surgery - s/p lobectomy. Recent appt with cardiology 6/15 - no longer in post op AF - for now continues eliquis + amiodarone. Informed patient of final care transition call - will continue to be followed by Oncology Navigator, Carlee Cornejo. Updated [facility] [place] on recent cardio appt & that patient is in NSR. Patient has completed the Care Transitions program on 22. Patient/family has the ability to self-manage at this time. Patient will follow with oncology navigator      Patient has Care Transition Nurse's contact information for any further questions, concerns, or needs.   Patients upcoming visits:    Future Appointments   Date Time Provider Avril Castro   2022 10:00 AM STV INTERVENT RM 1 STVZ SPECIAL STV Radiolog   2022 10:00 AM STV [place] RM STVZ PB CT STV [place]   2023 10:00 AM SCHEDULE, [facility] RAD ONC NURSE LEONARDO Barriga RN

## 2022-06-20 ENCOUNTER — TELEPHONE (OUTPATIENT)
Dept: ONCOLOGY | Age: 67
End: 2022-06-20

## 2022-06-20 NOTE — TELEPHONE ENCOUNTER
Torin ORDAZ, RN Navigator. I called [facility] and spoke with Zeyad Anand. Patient had consult on 6/15/22 and she is faxing the note. Next appointment is on 9/14/22. Kimmy obregon.

## 2022-06-20 NOTE — TELEPHONE ENCOUNTER
Name: Laxmi Cisse  : 1955  MRN: [id number]    Oncology Navigation Follow-Up Note    Contact Type:  Telephone  Notes: Spoke with Ryan Rojas, [facility] IR , to inquire on  port placement & scheduling sooner. Ryan Rojas stated offered sooner appts, pt declined, & pt requested first week of 2022. Will continue to follow.     Electronically signed by Dread Vazquez RN on 2022 at 9:01 AM

## 2022-06-23 ENCOUNTER — TELEPHONE (OUTPATIENT)
Dept: INFUSION THERAPY | Age: 67
End: 2022-06-23

## 2022-06-23 DIAGNOSIS — C34.32 PRIMARY MALIGNANT NEOPLASM OF LEFT LOWER LOBE OF LUNG (HCC): Primary | ICD-10-CM

## 2022-06-23 NOTE — TELEPHONE ENCOUNTER
Chemo orders received, ht 68\", wt 168lbs, and bsa 1.91 verified. Dose of chemotherapy verified;   Alimta 500mg/m2= 955mg  Carbo AUC 5 to be determined at tx

## 2022-06-23 NOTE — TELEPHONE ENCOUNTER
Chemotherapy orders received:    Ht=68 inches  Xw=890 lbs  BSA=1.91  Chemotherapy doses verified:  Alimta 500 mg/m2 = 955 mg  Carboplatin AUC 5 to be determined day of treatment   Susana Almanzar RN

## 2022-06-28 ENCOUNTER — HOSPITAL ENCOUNTER (OUTPATIENT)
Dept: INFUSION THERAPY | Age: 67
Discharge: HOME | End: 2022-06-28
Payer: MEDICARE

## 2022-06-28 DIAGNOSIS — C34.32 PRIMARY MALIGNANT NEOPLASM OF LEFT LOWER LOBE OF LUNG (HCC): Primary | ICD-10-CM

## 2022-06-28 PROCEDURE — 6360000002 HC RX W HCPCS: Performed by: INTERNAL MEDICINE

## 2022-06-28 PROCEDURE — 96372 THER/PROPH/DIAG INJ SC/IM: CPT

## 2022-06-28 PROCEDURE — 99213 OFFICE O/P EST LOW 20 MIN: CPT

## 2022-06-28 RX ORDER — DEXAMETHASONE 4 MG/1
4 TABLET ORAL SEE ADMIN INSTRUCTIONS
Qty: 24 TABLET | Refills: 0 | Status: SHIPPED | OUTPATIENT
Start: 2022-06-28 | End: 2022-07-08

## 2022-06-28 RX ORDER — FOLIC ACID 1 MG/1
1 TABLET ORAL DAILY
Qty: 90 TABLET | Refills: 3 | Status: SHIPPED | OUTPATIENT
Start: 2022-06-28 | End: 2022-08-09 | Stop reason: SDUPTHER

## 2022-06-28 RX ORDER — LIDOCAINE AND PRILOCAINE 25; 25 MG/G; MG/G
CREAM TOPICAL
Qty: 1 EACH | Refills: 2 | Status: SHIPPED | OUTPATIENT
Start: 2022-06-28 | End: 2022-11-04

## 2022-06-28 RX ORDER — PROCHLORPERAZINE MALEATE 10 MG
10 TABLET ORAL EVERY 6 HOURS PRN
Qty: 120 TABLET | Refills: 2 | Status: SHIPPED | OUTPATIENT
Start: 2022-06-28 | End: 2022-10-30

## 2022-06-28 RX ORDER — CYANOCOBALAMIN 1000 UG/ML
1000 INJECTION INTRAMUSCULAR; SUBCUTANEOUS ONCE
Status: COMPLETED | OUTPATIENT
Start: 2022-06-28 | End: 2022-06-28

## 2022-06-28 RX ORDER — ONDANSETRON 8 MG/1
8 TABLET, ORALLY DISINTEGRATING ORAL 3 TIMES DAILY PRN
Qty: 90 TABLET | Refills: 2 | Status: SHIPPED | OUTPATIENT
Start: 2022-06-28 | End: 2022-10-30

## 2022-06-28 RX ADMIN — CYANOCOBALAMIN 1000 MCG: 1000 INJECTION, SOLUTION INTRAMUSCULAR at 15:33

## 2022-06-28 NOTE — PROGRESS NOTES
Rodri Sommers was here for chemotherapy teaching. Spent 60 minutes with them. Teaching sheets from [facility] and verbal information given on chemotherapy agents, action, administration and side effects. Chemotherapy medications discussed: alimta and carboplatin     Chemotherapy consent form signed by patient. Provided new patient binder, Chemotherapy and You booklet, Eating Hints booklet     Discussed implanted port and demonstrated port access with sample port and fay needle. Patient to have port placed 7/5  Pt has prescription for EMLA cream and was given instructions on use.     Reviewed anti-emetic medication, pt has prescription for zofran and compazine  and was given instructions on use.     Questions answered and support given.     [facility] rehab referral assessment form, distress tool form and PG-SGA form completed by patient. Patient declined      Needs that were identified during teaching visit: no needs identified      Discussed community resources such as [address], [employer], [employer], [address] [place] [address], etc.     Pt will return on 7/7 for C1D1 and f/u with Dr. Iram Davis  on 7/26.     Quiana Sheffield RN

## 2022-07-01 ENCOUNTER — TELEPHONE (OUTPATIENT)
Dept: INFUSION THERAPY | Age: 67
End: 2022-07-01

## 2022-07-01 ENCOUNTER — TELEPHONE (OUTPATIENT)
Dept: ONCOLOGY | Age: 67
End: 2022-07-01

## 2022-07-01 NOTE — TELEPHONE ENCOUNTER
Patient called and left message stating that he was supposed to get port 7/5 and start chemo 7/7 but just tested positive for Covid. Lesa Clubs Dr. Crystal Britt and he stated that we are to push treatment 1 week, that it is ok to treat without port and that he will see the patient 7/26. Courtney Yan  updated.

## 2022-07-01 NOTE — TELEPHONE ENCOUNTER
Name: Rachel Finch  : 1955  MRN: [id number]    Oncology Navigation Follow-Up Note    Contact Type:  Telephone  Notes: Pt called in stating took 2 at home covid test today & both positive. Pt c/o lethargy, h/a, dry cough, & running nose. Spoke with Zaida [facility] IR , to inquire on rescheduling port placement. Corina Lara stated believes 5 day symptom free waiting period & awaits confirmation. Corina Lara stated will update writer asap. [facility] , updated. Zane ramos alerted writer triage nurse received VM from pt, contacted Dr. Angelica Piedra to update, & per Dr. Angelica Piedra orders pt to delay chemotherapy start 1 week. Corina Lara, [facility] IR , updated. Corina Lara stated will cancel  port placement & request writer update on pt's status  to reschedule port placement. Pt updated on Dr. Jodi Schroeder orders & conversation with Corina Laar. Instructed pt Zane ramos will contact with new chemotherapy appt details. Pt verbalized understanding & thanked writer. Instructed pt may contact writer prn. Will continue to follow.     Electronically signed by Becka Savage RN on 2022 at 8:52 AM

## 2022-07-05 ENCOUNTER — TELEPHONE (OUTPATIENT)
Dept: ONCOLOGY | Age: 67
End: 2022-07-05

## 2022-07-05 NOTE — TELEPHONE ENCOUNTER
Name: Jules Thomson  : 1955  MRN: [id number]    Oncology Navigation Follow-Up Note    Contact Type:  Telephone  Notes: Spoke with pt to inquire on covid symptoms. Pt stated symptoms resolved yesterday. Instructed pt writer will contact Jessica Hernández to update & request port placement rescheduled prior to  chemotherapy start. Pt verbalized understanding & denied questions/concerns. Iesha updated on pt. Jessica Hernández stated will contact pt to reschedule port placement. Will continue to follow.     Electronically signed by Margarita Lynn RN on 2022 at 9:44 AM

## 2022-07-08 ENCOUNTER — TELEPHONE (OUTPATIENT)
Dept: ONCOLOGY | Age: 67
End: 2022-07-08

## 2022-07-08 NOTE — TELEPHONE ENCOUNTER
[facility] Nutrition Note    PGSGA scoring tool reviewed with score <4. Automatic nutrition assessment consult populates from PGSGA tool for scores > 4. Will continue to follow as requested.     MARK Velasco, RD, LD  Registered Dietitian   [facility]  [phone]

## 2022-07-12 ENCOUNTER — HOSPITAL ENCOUNTER (OUTPATIENT)
Dept: INTERVENTIONAL RADIOLOGY/VASCULAR | Age: 67
Discharge: HOME | End: 2022-07-14
Payer: MEDICARE

## 2022-07-12 VITALS
HEART RATE: 52 BPM | TEMPERATURE: 96.4 F | OXYGEN SATURATION: 98 % | BODY MASS INDEX: 25.01 KG/M2 | DIASTOLIC BLOOD PRESSURE: 90 MMHG | RESPIRATION RATE: 16 BRPM | HEIGHT: 68 IN | SYSTOLIC BLOOD PRESSURE: 110 MMHG | WEIGHT: 165 LBS

## 2022-07-12 DIAGNOSIS — C34.32 CANCER OF LOWER LOBE OF LEFT LUNG (HCC): ICD-10-CM

## 2022-07-12 LAB
INR BLD: 0.9
PARTIAL THROMBOPLASTIN TIME: 23.8 SEC (ref 20.5–30.5)
PLATELET # BLD: 412 K/UL (ref 138–453)
PROTHROMBIN TIME: 10 SEC (ref 9.1–12.3)

## 2022-07-12 PROCEDURE — 6360000002 HC RX W HCPCS: Performed by: PHYSICIAN ASSISTANT

## 2022-07-12 PROCEDURE — 36561 INSERT TUNNELED CV CATH: CPT

## 2022-07-12 PROCEDURE — 85730 THROMBOPLASTIN TIME PARTIAL: CPT

## 2022-07-12 PROCEDURE — 2500000003 HC RX 250 WO HCPCS: Performed by: PHYSICIAN ASSISTANT

## 2022-07-12 PROCEDURE — 76937 US GUIDE VASCULAR ACCESS: CPT

## 2022-07-12 PROCEDURE — 85049 AUTOMATED PLATELET COUNT: CPT

## 2022-07-12 PROCEDURE — 7100000010 HC PHASE II RECOVERY - FIRST 15 MIN

## 2022-07-12 PROCEDURE — 7100000011 HC PHASE II RECOVERY - ADDTL 15 MIN

## 2022-07-12 PROCEDURE — 2580000003 HC RX 258: Performed by: PHYSICIAN ASSISTANT

## 2022-07-12 PROCEDURE — 6360000002 HC RX W HCPCS: Performed by: RADIOLOGY

## 2022-07-12 PROCEDURE — 85610 PROTHROMBIN TIME: CPT

## 2022-07-12 PROCEDURE — 77001 FLUOROGUIDE FOR VEIN DEVICE: CPT

## 2022-07-12 PROCEDURE — C1788 PORT, INDWELLING, IMP: HCPCS

## 2022-07-12 PROCEDURE — C1894 INTRO/SHEATH, NON-LASER: HCPCS

## 2022-07-12 PROCEDURE — 2709999900 HC NON-CHARGEABLE SUPPLY

## 2022-07-12 RX ORDER — HEPARIN SODIUM (PORCINE) LOCK FLUSH IV SOLN 100 UNIT/ML 100 UNIT/ML
SOLUTION INTRAVENOUS
Status: COMPLETED | OUTPATIENT
Start: 2022-07-12 | End: 2022-07-12

## 2022-07-12 RX ORDER — SODIUM CHLORIDE 9 MG/ML
INJECTION, SOLUTION INTRAVENOUS CONTINUOUS
Status: DISCONTINUED | OUTPATIENT
Start: 2022-07-12 | End: 2022-07-15 | Stop reason: HOSPADM

## 2022-07-12 RX ORDER — FENTANYL CITRATE 50 UG/ML
INJECTION, SOLUTION INTRAMUSCULAR; INTRAVENOUS
Status: COMPLETED | OUTPATIENT
Start: 2022-07-12 | End: 2022-07-12

## 2022-07-12 RX ORDER — ACETAMINOPHEN 325 MG/1
650 TABLET ORAL EVERY 4 HOURS PRN
Status: DISCONTINUED | OUTPATIENT
Start: 2022-07-12 | End: 2022-07-15 | Stop reason: HOSPADM

## 2022-07-12 RX ADMIN — FENTANYL CITRATE 50 MCG: 50 INJECTION, SOLUTION INTRAMUSCULAR; INTRAVENOUS at 10:58

## 2022-07-12 RX ADMIN — WATER 1000 MG: 100 INJECTION, SOLUTION INTRAVENOUS at 09:01

## 2022-07-12 RX ADMIN — FENTANYL CITRATE 50 MCG: 50 INJECTION, SOLUTION INTRAMUSCULAR; INTRAVENOUS at 10:42

## 2022-07-12 RX ADMIN — SODIUM CHLORIDE: 9 INJECTION, SOLUTION INTRAVENOUS at 08:45

## 2022-07-12 RX ADMIN — HEPARIN 5 ML: 100 SYRINGE at 10:53

## 2022-07-12 ASSESSMENT — PAIN SCALES - GENERAL
PAINLEVEL_OUTOF10: 4
PAINLEVEL_OUTOF10: 5
PAINLEVEL_OUTOF10: 0

## 2022-07-12 ASSESSMENT — PAIN - FUNCTIONAL ASSESSMENT: PAIN_FUNCTIONAL_ASSESSMENT: NONE - DENIES PAIN

## 2022-07-12 ASSESSMENT — PAIN DESCRIPTION - LOCATION: LOCATION: CHEST

## 2022-07-12 ASSESSMENT — PAIN DESCRIPTION - ORIENTATION: ORIENTATION: RIGHT

## 2022-07-12 ASSESSMENT — PAIN DESCRIPTION - PAIN TYPE: TYPE: ACUTE PAIN

## 2022-07-12 NOTE — H&P
History and Physical    Pt Name: Aimee Cespedes  MRN: [id number]  YOB: 1955  Date of evaluation: 7/12/2022  Primary Care Physician: Rickey Mendoza MD    SUBJECTIVE:   History of Chief Complaint:    Aimee Cespedes is a 79 y.o. male who is scheduled today for IR port placement. He reports having a new diagnosis of lung cancer. He reports being s/p left lobectomy on 5/23/22 (Dr. Nevin Spring). He reports prior history of cancer, tonsillar- treated with radiation in 2021. Allergies  is allergic to seasonal.  Medications  Prior to Admission medications    Medication Sig Start Date End Date Taking?  Authorizing Provider   lidocaine-prilocaine (EMLA) 2.5-2.5 % cream Apply topically 45-60 mins prior to needle poke daily PRN 6/28/22   Brayan Hernandez MD   ondansetron (ZOFRAN-ODT) 8 MG TBDP disintegrating tablet Place 1 tablet under the tongue 3 times daily as needed for Nausea or Vomiting 6/28/22   Brayan Hernandez MD   prochlorperazine (COMPAZINE) 10 MG tablet Take 1 tablet by mouth every 6 hours as needed (nausea vomiting) 6/28/22   Brayan Hernandez MD   folic acid (FOLVITE) 1 MG tablet Take 1 tablet by mouth daily Start 7 days prior to chemotherapy 6/28/22   Brayan Hernandez MD   metoprolol tartrate (LOPRESSOR) 25 MG tablet Take 1 tablet by mouth 2 times daily 5/27/22   OVIDIO Aguilar NP   lisinopril (PRINIVIL;ZESTRIL) 10 MG tablet Take 1 tablet by mouth daily 5/28/22   OVIDIO Aguilar NP   apixaban (ELIQUIS) 5 MG TABS tablet Take 1 tablet by mouth 2 times daily 5/27/22   OVIDIO Ryan NP   amiodarone (CORDARONE) 200 MG tablet Take 1 tablet by mouth 2 times daily 5/26/22   Wing Ade MD   famotidine (PEPCID) 20 MG tablet Take 1 tablet by mouth 2 times daily 5/24/22   OVIDIO Aguilar NP   lidocaine 4 % external patch Place 1 patch onto the skin daily 5/24/22   Laquetta Rye, APRN - NP   aspirin 81 MG chewable tablet Take 81 mg by mouth daily     Historical Provider, MD   simvastatin (ZOCOR) 20 MG tablet TAKE 1 TABLET BY MOUTH AT  NIGHT 22   Chana Davenport MD   hydroCHLOROthiazide (HYDRODIURIL) 12.5 MG tablet TAKE 1 TABLET BY MOUTH  DAILY 21  Chana Davenport MD   omeprazole (PRILOSEC) 20 MG delayed release capsule Take 20 mg by mouth daily Pt takes 10mg daily    Historical Provider, MD   Multiple Vitamins-Minerals (THERAPEUTIC MULTIVITAMIN-MINERALS) tablet Take 1 tablet by mouth daily    Historical Provider, MD     Past Medical History    has a past medical history of COVID-19, Health care maintenance, History of atrial fibrillation, Hyperlipidemia, Hypertension, Lung cancer ([facility] and Tonsillar cancer ([facility] Past Surgical History   has a past surgical history that includes Achilles tendon surgery; Colonoscopy; CT NEEDLE BIOPSY LUNG PERCUTANEOUS (2022); Lung lobectomy (Left, 2022); lymph node dissection (Left, 2022); and Lung removal, total (Left, 2022). Social History  Reports he quit smoking 40 yrs ago   reports current alcohol use. reports previous drug use. Marital Status    Children 4  Occupation retired, /sales  Family History  Family Status   Relation Name Status    Mother     [facility] Father       family history includes Cancer in his father; Coronary Art Dis in his mother.     Review of Systems:  CONSTITUTIONAL:   negative for fevers, chills, fatigue and malaise    EYES:   negative for double vision, blurred vision and photophobia    HEENT:   negative for tinnitus, epistaxis and sore throat     RESPIRATORY:   negative for cough, shortness of breath, wheezing     CARDIOVASCULAR:   negative for chest pain, palpitations, syncope, edema     GASTROINTESTINAL:   negative for nausea, vomiting     GENITOURINARY:   negative for incontinence     MUSCULOSKELETAL:   negative for neck or back pain     NEUROLOGICAL:   Negative for weakness and tingling  negative for headaches and dizziness PSYCHIATRIC:   negative for anxiety       OBJECTIVE:   VITALS:  height is 5' 8\" (1.727 m) and weight is 165 lb (74.8 kg). His temporal temperature is 96.4 °F (35.8 °C) (abnormal). His pulse is 52. His respiration is 16 and oxygen saturation is 99%. CONSTITUTIONAL:alert & oriented x 3, no acute distress. Calm and pleasant. SKIN:  Warm and dry, no rashes on exposed areas of skin   HEAD:  Normocephalic, atraumatic   EYES: PERRL. EOMs intact. EARS:  Equal bilaterally, no edema or thickening, skin is intact without lumps or lesions. No discharge. Hearing grossly WNL. NOSE:  Nares patent. No rhinorrhea   MOUTH/THROAT:  Mucous membranes moist, tongue is pink, uvula midline, teeth appear to be intact  NECK:supple, full ROM  LUNGS: Respirations even and non-labored. Clear to auscultation bilaterally, no wheezes, rales, or rhonchi. CARDIOVASCULAR: mildly bradycardic, regular, no murmurs/rubs/gallops   ABDOMEN: soft, non-tender, non-distended, bowel sounds active x 4   EXTREMITIES: No edema bilateral lower extremities. No varicosities bilateral lower extremities. NEUROLOGIC: CN II-XII are grossly intact. Gait not assessed.    IMPRESSIONS:   Lung cancer  PLANS:   IR port placement    OVIDIO ORTIZ CNP   Electronically signed 7/12/2022 at 9:04 AM

## 2022-07-12 NOTE — PROGRESS NOTES
1130 returned post ir port placement awake denies pain r chest port site with glued site no drainage noted on monitors resting quiet

## 2022-07-12 NOTE — PROGRESS NOTES
DISCHARGE CRITERIA MET INSTRUCTIONS GIVEN  IV DISCONTINUED SITE CLEAR  R CHEST PORT SITE NO DRAINAGE NOTED DENIES PAIN

## 2022-07-12 NOTE — BRIEF OP NOTE
Brief Postoperative Note for [facility]    Tiera Delcid  YOB: 1955  [id number]    Pre-operative Diagnosis: Lung CA      Post-operative Diagnosis: Same    Procedure: General Pih 8 Fr Port Placement    Medication Given:  fentanyl     Anesthesia: 1 %Lidocaine, 1% Lidocaine w/ Epi    Surgeons/Assistants: Christian Richards MD and BERNARDO Hatfield    Estimated Blood Loss: Minimal    Complications: none    Specimen Removal: None    8 Fr Port placement into the right IJ. Port flushed with heparin. Incision site closed with Vicryl sutures. Dressing applied. Vital signs were reviewed and were stable after the procedure. Patient tolerated the procedure well.     Electronically signed by BERNARDO Koroma PA-C on 7/12/2022 at 11:23 AM

## 2022-07-19 ENCOUNTER — HOSPITAL ENCOUNTER (OUTPATIENT)
Dept: INFUSION THERAPY | Age: 67
Discharge: HOME | End: 2022-07-19
Payer: MEDICARE

## 2022-07-19 VITALS
WEIGHT: 167.4 LBS | HEIGHT: 68 IN | DIASTOLIC BLOOD PRESSURE: 67 MMHG | RESPIRATION RATE: 16 BRPM | TEMPERATURE: 98.3 F | BODY MASS INDEX: 25.37 KG/M2 | HEART RATE: 56 BPM | SYSTOLIC BLOOD PRESSURE: 142 MMHG

## 2022-07-19 DIAGNOSIS — C34.32 PRIMARY MALIGNANT NEOPLASM OF LEFT LOWER LOBE OF LUNG (HCC): Primary | ICD-10-CM

## 2022-07-19 LAB
ABSOLUTE EOS #: 0 K/UL (ref 0–0.4)
ABSOLUTE LYMPH #: 0.5 K/UL (ref 1–4.8)
ABSOLUTE MONO #: 0.2 K/UL (ref 0.1–1.2)
ALBUMIN SERPL-MCNC: 4.6 G/DL (ref 3.5–5.2)
ALBUMIN/GLOBULIN RATIO: 2 (ref 1–2.5)
ALP BLD-CCNC: 76 U/L (ref 40–129)
ALT SERPL-CCNC: 12 U/L (ref 5–41)
ANION GAP SERPL CALCULATED.3IONS-SCNC: 13 MMOL/L (ref 9–17)
AST SERPL-CCNC: 13 U/L
BASOPHILS # BLD: 1 % (ref 0–2)
BASOPHILS ABSOLUTE: 0.1 K/UL (ref 0–0.2)
BILIRUB SERPL-MCNC: 0.53 MG/DL (ref 0.3–1.2)
BUN BLDV-MCNC: 15 MG/DL (ref 8–23)
CALCIUM SERPL-MCNC: 9.5 MG/DL (ref 8.6–10.4)
CHLORIDE BLD-SCNC: 100 MMOL/L (ref 98–107)
CO2: 22 MMOL/L (ref 20–31)
CREAT SERPL-MCNC: 0.72 MG/DL (ref 0.7–1.2)
EOSINOPHILS RELATIVE PERCENT: 0 % (ref 1–4)
GFR AFRICAN AMERICAN: >60 ML/MIN
GFR NON-AFRICAN AMERICAN: >60 ML/MIN
GFR SERPL CREATININE-BSD FRML MDRD: ABNORMAL ML/MIN/{1.73_M2}
GLUCOSE BLD-MCNC: 195 MG/DL (ref 70–99)
HCT VFR BLD CALC: 37.5 % (ref 41–53)
HEMOGLOBIN: 12.9 G/DL (ref 13.5–17.5)
LYMPHOCYTES # BLD: 5 % (ref 24–44)
MCH RBC QN AUTO: 30.1 PG (ref 26–34)
MCHC RBC AUTO-ENTMCNC: 34.4 G/DL (ref 31–37)
MCV RBC AUTO: 87.5 FL (ref 80–100)
MONOCYTES # BLD: 2 % (ref 2–11)
PDW BLD-RTO: 13.9 % (ref 12.5–15.4)
PLATELET # BLD: 348 K/UL (ref 140–450)
PMV BLD AUTO: 7.8 FL (ref 6–12)
POTASSIUM SERPL-SCNC: 3.8 MMOL/L (ref 3.7–5.3)
RBC # BLD: 4.28 M/UL (ref 4.5–5.9)
SEG NEUTROPHILS: 92 % (ref 36–66)
SEGMENTED NEUTROPHILS ABSOLUTE COUNT: 9.5 K/UL (ref 1.8–7.7)
SODIUM BLD-SCNC: 135 MMOL/L (ref 135–144)
TOTAL PROTEIN: 6.9 G/DL (ref 6.4–8.3)
WBC # BLD: 10.3 K/UL (ref 3.5–11)

## 2022-07-19 PROCEDURE — 96413 CHEMO IV INFUSION 1 HR: CPT

## 2022-07-19 PROCEDURE — 36591 DRAW BLOOD OFF VENOUS DEVICE: CPT

## 2022-07-19 PROCEDURE — 80053 COMPREHEN METABOLIC PANEL: CPT

## 2022-07-19 PROCEDURE — 96411 CHEMO IV PUSH ADDL DRUG: CPT

## 2022-07-19 PROCEDURE — 85025 COMPLETE CBC W/AUTO DIFF WBC: CPT

## 2022-07-19 PROCEDURE — 2580000003 HC RX 258: Performed by: INTERNAL MEDICINE

## 2022-07-19 PROCEDURE — 96375 TX/PRO/DX INJ NEW DRUG ADDON: CPT

## 2022-07-19 PROCEDURE — 96367 TX/PROPH/DG ADDL SEQ IV INF: CPT

## 2022-07-19 PROCEDURE — 6360000002 HC RX W HCPCS: Performed by: INTERNAL MEDICINE

## 2022-07-19 RX ORDER — SODIUM CHLORIDE 9 MG/ML
5-40 INJECTION INTRAVENOUS PRN
Status: CANCELLED | OUTPATIENT
Start: 2022-07-19

## 2022-07-19 RX ORDER — SODIUM CHLORIDE 9 MG/ML
INJECTION, SOLUTION INTRAVENOUS CONTINUOUS
Status: CANCELLED | OUTPATIENT
Start: 2022-07-19

## 2022-07-19 RX ORDER — MEPERIDINE HYDROCHLORIDE 50 MG/ML
12.5 INJECTION INTRAMUSCULAR; INTRAVENOUS; SUBCUTANEOUS PRN
Status: CANCELLED | OUTPATIENT
Start: 2022-07-19

## 2022-07-19 RX ORDER — CYANOCOBALAMIN 1000 UG/ML
1000 INJECTION INTRAMUSCULAR; SUBCUTANEOUS
Status: CANCELLED | OUTPATIENT
Start: 2022-07-19

## 2022-07-19 RX ORDER — DIPHENHYDRAMINE HYDROCHLORIDE 50 MG/ML
50 INJECTION INTRAMUSCULAR; INTRAVENOUS
Status: CANCELLED | OUTPATIENT
Start: 2022-07-19

## 2022-07-19 RX ORDER — DEXAMETHASONE SODIUM PHOSPHATE 10 MG/ML
10 INJECTION INTRAMUSCULAR; INTRAVENOUS ONCE
Status: COMPLETED | OUTPATIENT
Start: 2022-07-19 | End: 2022-07-19

## 2022-07-19 RX ORDER — EPINEPHRINE 1 MG/ML
0.3 INJECTION, SOLUTION, CONCENTRATE INTRAVENOUS PRN
Status: CANCELLED | OUTPATIENT
Start: 2022-07-19

## 2022-07-19 RX ORDER — ACETAMINOPHEN 325 MG/1
650 TABLET ORAL
Status: CANCELLED | OUTPATIENT
Start: 2022-07-19

## 2022-07-19 RX ORDER — ALBUTEROL SULFATE 90 UG/1
4 AEROSOL, METERED RESPIRATORY (INHALATION) PRN
Status: CANCELLED | OUTPATIENT
Start: 2022-07-19

## 2022-07-19 RX ORDER — SODIUM CHLORIDE 0.9 % (FLUSH) 0.9 %
5-40 SYRINGE (ML) INJECTION PRN
Status: DISCONTINUED | OUTPATIENT
Start: 2022-07-19 | End: 2022-07-20 | Stop reason: HOSPADM

## 2022-07-19 RX ORDER — SODIUM CHLORIDE 9 MG/ML
5-250 INJECTION, SOLUTION INTRAVENOUS PRN
Status: DISCONTINUED | OUTPATIENT
Start: 2022-07-19 | End: 2022-07-20 | Stop reason: HOSPADM

## 2022-07-19 RX ORDER — FAMOTIDINE 10 MG/ML
20 INJECTION, SOLUTION INTRAVENOUS
Status: CANCELLED | OUTPATIENT
Start: 2022-07-19

## 2022-07-19 RX ORDER — ONDANSETRON 2 MG/ML
8 INJECTION INTRAMUSCULAR; INTRAVENOUS
Status: CANCELLED | OUTPATIENT
Start: 2022-07-19

## 2022-07-19 RX ORDER — PALONOSETRON 0.05 MG/ML
0.25 INJECTION, SOLUTION INTRAVENOUS ONCE
Status: COMPLETED | OUTPATIENT
Start: 2022-07-19 | End: 2022-07-19

## 2022-07-19 RX ORDER — SODIUM CHLORIDE 9 MG/ML
5-250 INJECTION, SOLUTION INTRAVENOUS PRN
Status: CANCELLED | OUTPATIENT
Start: 2022-07-19

## 2022-07-19 RX ORDER — HEPARIN SODIUM (PORCINE) LOCK FLUSH IV SOLN 100 UNIT/ML 100 UNIT/ML
500 SOLUTION INTRAVENOUS PRN
Status: DISCONTINUED | OUTPATIENT
Start: 2022-07-19 | End: 2022-07-20 | Stop reason: HOSPADM

## 2022-07-19 RX ADMIN — HEPARIN 500 UNITS: 100 SYRINGE at 11:16

## 2022-07-19 RX ADMIN — DEXAMETHASONE SODIUM PHOSPHATE 10 MG: 10 INJECTION INTRAMUSCULAR; INTRAVENOUS at 08:35

## 2022-07-19 RX ADMIN — SODIUM CHLORIDE, PRESERVATIVE FREE 10 ML: 5 INJECTION INTRAVENOUS at 08:04

## 2022-07-19 RX ADMIN — SODIUM CHLORIDE, PRESERVATIVE FREE 10 ML: 5 INJECTION INTRAVENOUS at 11:16

## 2022-07-19 RX ADMIN — SODIUM CHLORIDE 50 ML/HR: 9 INJECTION, SOLUTION INTRAVENOUS at 08:35

## 2022-07-19 RX ADMIN — SODIUM CHLORIDE 1000 MG: 9 INJECTION, SOLUTION INTRAVENOUS at 09:54

## 2022-07-19 RX ADMIN — PALONOSETRON 0.25 MG: 0.05 INJECTION, SOLUTION INTRAVENOUS at 08:35

## 2022-07-19 RX ADMIN — CARBOPLATIN 630 MG: 10 INJECTION INTRAVENOUS at 10:07

## 2022-07-19 RX ADMIN — FOSAPREPITANT 150 MG: 150 INJECTION, POWDER, LYOPHILIZED, FOR SOLUTION INTRAVENOUS at 09:08

## 2022-07-19 NOTE — PROGRESS NOTES
Patient here for C1D1 alimta/Carbo. Labs drawn from port and reviewed. Denies any issues at this time. He tolerated treatment well and was discharged home in stable condition with spouse. He is due to return 7/26 for C2D1 and MD visit.

## 2022-07-26 ENCOUNTER — TELEPHONE (OUTPATIENT)
Dept: ONCOLOGY | Age: 67
End: 2022-07-26

## 2022-07-26 ENCOUNTER — OFFICE VISIT (OUTPATIENT)
Dept: ONCOLOGY | Age: 67
End: 2022-07-26
Payer: MEDICARE

## 2022-07-26 VITALS
DIASTOLIC BLOOD PRESSURE: 84 MMHG | WEIGHT: 166.1 LBS | TEMPERATURE: 97.4 F | BODY MASS INDEX: 25.26 KG/M2 | SYSTOLIC BLOOD PRESSURE: 144 MMHG | HEART RATE: 51 BPM

## 2022-07-26 DIAGNOSIS — C10.9 OROPHARYNGEAL CANCER (HCC): Primary | ICD-10-CM

## 2022-07-26 PROCEDURE — 99211 OFF/OP EST MAY X REQ PHY/QHP: CPT | Performed by: INTERNAL MEDICINE

## 2022-07-26 PROCEDURE — 1036F TOBACCO NON-USER: CPT | Performed by: INTERNAL MEDICINE

## 2022-07-26 PROCEDURE — G8427 DOCREV CUR MEDS BY ELIG CLIN: HCPCS | Performed by: INTERNAL MEDICINE

## 2022-07-26 PROCEDURE — 99215 OFFICE O/P EST HI 40 MIN: CPT | Performed by: INTERNAL MEDICINE

## 2022-07-26 PROCEDURE — 1123F ACP DISCUSS/DSCN MKR DOCD: CPT | Performed by: INTERNAL MEDICINE

## 2022-07-26 PROCEDURE — G8417 CALC BMI ABV UP PARAM F/U: HCPCS | Performed by: INTERNAL MEDICINE

## 2022-07-26 PROCEDURE — 3017F COLORECTAL CA SCREEN DOC REV: CPT | Performed by: INTERNAL MEDICINE

## 2022-07-26 NOTE — TELEPHONE ENCOUNTER
AVS from 7/26/22    Planned every 3 weeks for 4 cycles   RTc with c2      Rv scheduled for 8/9 @ 10:00 pt will be see upstairs during tx per md    Pt was given AVS and appointment schedule     Electronically signed by Babita Cortés on 7/26/2022 at 3:16 PM          Electronically signed by Babita Cortés on 7/26/2022 at 3:15 PM

## 2022-07-26 NOTE — TELEPHONE ENCOUNTER
Name: Link Nieto  : 1955  MRN: [id number]    Oncology Navigation Follow-Up Note    Contact Type:  Telephone  Notes: Pt @ [facility] for Dr. Yakelin Martinez f/u. Met with pt prior to f/u. Pt denied questions/concerns. Instructed pt may contact writer prn. Will continue to follow.     Electronically signed by Mark Vance RN on 2022 at 11:24 AM

## 2022-07-26 NOTE — PROGRESS NOTES
Patient ID: Allison Burns, 1955, [id number], 79 y.o. Referred by : Christina Johnson MD  Diagnosis:   Oropharyngeal carcin shirley, left tonsil squamous cell carcinoma, p16 positive, clinical stage T2 a N0 M0, stage I diseasePatient has been evaluated by ENT surgical oncology at [facility] and as per the tumor board recommendation definitive radiation therapy was recommended   Patient started on definitive radiation therapy on 11/9/2021   He  completed RT on December 29, 2021     Cancer Staging  Oropharyngeal cancer [facility])  Staging form: Pharynx - HPV-Mediated Oropharynx, AJCC 8th Edition  - Clinical stage from 10/12/2021: Stage I (cT2, cN0, cM0) - Signed by Fidel Deleon MD on 10/12/2021    Primary malignant neoplasm of left lower lobe of lung ([facility] [address]  Staging form: Lung, AJCC 8th Edition  - Clinical stage from 4/6/2022: Stage IA2 (cT1b, cN0, cM0) - Signed by Lindsay Moya MD on 4/13/2022    3/28/2022 had a PET scan which showed resolution of uptake in the tonsil and no active lymph node in the cervical region however 1.9 cm left lower lobe nodule noted with FDG activity. Patient had CT-guided biopsy on 4/6/2022 which showed adenocarcinoma  Patient underwent lower lobectomy on 5/23/2022. Final surgical pathology showed T2 a N0 M0, with invasion of the visceral pleura with extension to the pleural surface  7/19/22  started on adjuvant chemo with [facility]      HISTORY OF PRESENT ILLNESS:    Oncologic History:  Allison Burns is 79 y.o. male was seen during initial consultation with for newly diagnosed left tonsillar/oropharyngeal carcinoma. Patient initially presented with sore throat/throat pain in May of this year and was referred to ENT for further evaluation. His ENT evaluation did show 3 cm left tonsillar mass limited to tonsils only. Patient had a CT of the neck on 9/22/2021 which showed 3.2 cm mass in the left palatine tonsil without any abnormal lymphadenopathy.   Patient was evaluated by ENT and had a biopsy of the left tonsil which showed poorly differentiated invasive squamous cell carcinoma, p16 positive. Subsequently patient had a PET scan on 10/6/2021 which showed no distant metastasis and no lymph node metastasis noted. Patient has been evaluated by radiation oncology  He denies any unintentional weight loss, drenching night sweats fever chills. INTERVAL HISTORY  Patient is returning for follow-up visit and to discuss lab results and further recommendations. He has tolerated first cycle of chemotherapy well. He report significant fatigue for past 3 to 4 days after his first cycle of chemotherapy but now has recovered well. He denies any significant nausea vomiting. He denies any neuropathy. He denied any fever chills     He denies any trouble swallowing and he is eating much better. During this visit patient's allergy, social, medical, surgical history and medications were reviewed and updated. Past Medical History:   Diagnosis Date    COVID-19     home test, mild URI sx's, no hospitalization    Health care maintenance     PCP- Samara Stein-  seen  6/2021    History of atrial fibrillation     follows with [facility], on eliquis    Hyperlipidemia     Hypertension     Lung cancer ([facility] [address]     Tonsillar cancer ([facility] [address] 09/2021    Tonsilar cancer. ENT -Dr Chapo Storey ,       Past Surgical History:   Procedure Laterality Date    ACHILLES TENDON SURGERY      about 30 years ago     COLONOSCOPY      CT NEEDLE BIOPSY LUNG PERCUTANEOUS  4/6/2022    CT NEEDLE BIOPSY LUNG PERCUTANEOUS 4/6/2022 [facility] CT SCAN    IR PORT PLACEMENT EQUAL OR GREATER THAN 5 YEARS  7/12/2022    IR PORT PLACEMENT EQUAL OR GREATER THAN 5 YEARS 7/12/2022 Orlin Velez MD [facility] SPECIAL PROCEDURES    LOBECTOMY Left 05/23/2022    robotic video assisted thoracoscoopy, lower lobectomy with lymphnode dissection    LUNG REMOVAL, TOTAL Left 5/23/2022    XI ROBOTIC VIDEO ASSISTED THORACOSCOPY, LOWER LOBECTOMY performed by Katya Hooper MD at [facility] OR    LYMPH NODE DISSECTION Left 05/23/2022       Allergies   Allergen Reactions    Seasonal Other (See Comments)     Runny nose scratchy throat       Current Outpatient Medications   Medication Sig Dispense Refill    lidocaine-prilocaine (EMLA) 2.5-2.5 % cream Apply topically 45-60 mins prior to needle poke daily PRN 1 each 2    ondansetron (ZOFRAN-ODT) 8 MG TBDP disintegrating tablet Place 1 tablet under the tongue 3 times daily as needed for Nausea or Vomiting 90 tablet 2    prochlorperazine (COMPAZINE) 10 MG tablet Take 1 tablet by mouth every 6 hours as needed (nausea vomiting) 250 tablet 2    folic acid (FOLVITE) 1 MG tablet Take 1 tablet by mouth daily Start 7 days prior to chemotherapy 90 tablet 3    metoprolol tartrate (LOPRESSOR) 25 MG tablet Take 1 tablet by mouth 2 times daily 60 tablet 3    lisinopril (PRINIVIL;ZESTRIL) 10 MG tablet Take 1 tablet by mouth daily 30 tablet 3    apixaban (ELIQUIS) 5 MG TABS tablet Take 1 tablet by mouth 2 times daily 120 tablet 4    amiodarone (CORDARONE) 200 MG tablet Take 1 tablet by mouth 2 times daily 30 tablet 1    famotidine (PEPCID) 20 MG tablet Take 1 tablet by mouth 2 times daily 60 tablet 3    lidocaine 4 % external patch Place 1 patch onto the skin daily 15 patch 0    aspirin 81 MG chewable tablet Take 81 mg by mouth daily       simvastatin (ZOCOR) 20 MG tablet TAKE 1 TABLET BY MOUTH AT  NIGHT 90 tablet 3    omeprazole (PRILOSEC) 20 MG delayed release capsule Take 20 mg by mouth daily Pt takes 10mg daily      Multiple Vitamins-Minerals (THERAPEUTIC MULTIVITAMIN-MINERALS) tablet Take 1 tablet by mouth daily      hydroCHLOROthiazide (HYDRODIURIL) 12.5 MG tablet TAKE 1 TABLET BY MOUTH  DAILY 60 tablet 5     No current facility-administered medications for this visit.        Social History     Socioeconomic History    Marital status:      Spouse name: Not on file    Number of children: Not on file    Years of education: Not on file    Highest education level: Not on file   Occupational History    Not on file   Tobacco Use    Smoking status: Former     Types: Cigarettes     Quit date: 3/30/2022     Years since quittin.3    Smokeless tobacco: Never    Tobacco comments:     states quit smoking \"40 yrs ago\"   Vaping Use    Vaping Use: Never used   Substance and Sexual Activity    Alcohol use: Yes     Comment: socially     Drug use: Not Currently    Sexual activity: Yes     Partners: Female   Other Topics Concern    Not on file   Social History Narrative    Not on file     Social Determinants of Health     Financial Resource Strain: Not on file   Food Insecurity: Not on file   Transportation Needs: Not on file   Physical Activity: Not on file   Stress: Not on file   Social Connections: Not on file   Intimate Partner Violence: Not on file   Housing Stability: Not on file       Family History   Problem Relation Age of Onset    Coronary Art Dis Mother     Cancer Father         REVIEW OF SYSTEM:     Constitutional: No fever or chills. No night sweats, no weight loss   Eyes: No eye discharge, double vision, or eye pain   HEENT: negative for sore mouth, sore throat, hoarseness and voice change   Respiratory: negative for cough , sputum, dyspnea, wheezing, hemoptysis, chest pain   Cardiovascular: negative for chest pain, dyspnea, palpitations, orthopnea, PND   Gastrointestinal: negative for nausea, vomiting, diarrhea, constipation, abdominal pain, Dysphagia, hematemesis and hematochezia   Genitourinary: negative for frequency, dysuria, nocturia, urinary incontinence, and hematuria   Integument: negative for rash, skin lesions, bruises.    Hematologic/Lymphatic: negative for easy bruising, bleeding, lymphadenopathy, petechiae and swelling/edema   Endocrine: negative for heat or cold intolerance, tremor, weight changes, change in bowel habits and hair loss   Musculoskeletal: negative for myalgias, arthralgias, pain, joint swelling,and bone pain   Neurological: negative for headaches, dizziness, seizures, weakness, numbness       OBJECTIVE:         Vitals:    07/26/22 1112   BP: (!) 144/84   Pulse: 51   Temp: 97.4 °F (36.3 °C)       PHYSICAL EXAM:   General appearance - well appearing, no in pain or distress   Mental status - alert and cooperative   Eyes - pupils equal and reactive, extraocular eye movements intact   Ears - bilateral TM's and external ear canals normal   Mouth - mucous membranes moist, pharynx normal without lesions   Neck - supple, no significant adenopathy   Lymphatics - no palpable lymphadenopathy, no hepatosplenomegaly   Chest - clear to auscultation, no wheezes, rales or rhonchi, symmetric air entry   Heart - normal rate, regular rhythm, normal S1, S2, no murmurs, rubs, clicks or gallops   Abdomen - soft, nontender, nondistended, no masses or organomegaly   Neurological - alert, oriented, normal speech, no focal findings or movement disorder noted   Musculoskeletal - no joint tenderness, deformity or swelling   Extremities - peripheral pulses normal, no pedal edema, no clubbing or cyanosis   Skin - normal coloration and turgor, no rashes, no suspicious skin lesions noted ,      LABORATORY DATA:     Lab Results   Component Value Date    WBC 10.3 07/19/2022    HGB 12.9 (L) 07/19/2022    HCT 37.5 (L) 07/19/2022    MCV 87.5 07/19/2022     07/19/2022    LYMPHOPCT 5 (L) 07/19/2022    RBC 4.28 (L) 07/19/2022    MCH 30.1 07/19/2022    MCHC 34.4 07/19/2022    RDW 13.9 07/19/2022    MONOPCT 2 07/19/2022    BASOPCT 1 07/19/2022    NEUTROABS 9.50 (H) 07/19/2022    LYMPHSABS 0.50 (L) 07/19/2022    MONOSABS 0.20 07/19/2022    EOSABS 0.00 07/19/2022    BASOSABS 0.10 07/19/2022         Chemistry        Component Value Date/Time     07/19/2022 0800    K 3.8 07/19/2022 0800     07/19/2022 0800    CO2 22 07/19/2022 0800    BUN 15 07/19/2022 0800    CREATININE 0.72 07/19/2022 0800        Component Value Date/Time    CALCIUM 9.5 07/19/2022 0800    ALKPHOS 76 07/19/2022 0800    AST 13 07/19/2022 0800    ALT 12 07/19/2022 0800    BILITOT 0.53 07/19/2022 0800        PATHOLOGY DATA:   10/1/2021  8:43 AM - August Pierson Incoming Lab Results From [facility]    Component Collected Lab   Surgical Pathology Report 09/27/2021 10:13  [address]   -- Diagnosis --   LEFT TONSIL, BIOPSIES:             -  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 Surgical Pathology Report  SURGICAL PATHOLOGY REPORT  Collected: 04/06/22 1230   Lab status: Final   Resulting lab: [facility]   Value: -- Diagnosis --   LUNG, LEFT LOWER LOBE, CT-GUIDED CORE NEEDLE BIOPSY:        -  ADENOCARCINOMA, CONSISTENT WITH LUNG PRIMARY. Surgical Pathology Report  SURGICAL PATHOLOGY REPORT  Collected: 05/23/22 1235   Lab status: Final   Resulting lab: [facility]   Value: -- Diagnosis --   LUNG, LEFT LOWER LOBE, LOBECTOMY:   -LUNG INVASIVE ACINAR ADENOCARCINOMA, POORLY DIFFERENTIATED, SIZE 2.8   CM, WITH INVASION OF THE VISCERAL PLEURA WITH EXTENSION TO THE PLEURAL   SURFACE. -MARGINS NEGATIVE.     -FIVE (5) LYMPH NODES NEGATIVE FOR CARCINOMA. DISTANT SITE(S) INVOLVED, IF APPLICABLE: N/A. PATHOLOGIC STAGE CLASSIFICATION:     pT2a  pN0     IMAGING DATA:    PET CT SKULL BASE TO MID THIGH  Narrative: EXAMINATION:  WHOLE BODY PET/CT  10/6/2021    TECHNIQUE:  Following IV injection of 11.4 mCi of F 18 -FDG, PET  tumor imaging was  acquired from the base of the skull to the mid thighs. Computed tomography  was used for purposes of attenuation correction and anatomic localization. Fusion imaging was utilized for interpretation. Uptake time 59 mins. Glucose level 105 mg/dl.     COMPARISON:  CT neck 09/22/2021    HISTORY:  Reason for Exam: Malignant neoplasm of tonsillar fossa  Acuity: Acute  Type of Exam: Initial    FINDINGS:  HEAD/NECK: Redemonstration of left palatine tonsillar lesion which is FDG  avid with max SUV of 12.0. No metabolically active cervical lymphadenopathy. 8 mm posterior right thyroid hypodense nodule which is FDG avid with max SUV  of 6.0. CHEST: Left retrocardiac lower lobe nodular area of opacification measuring 2  cm which demonstrates faint uptake with max SUV of 2.3. No metabolically  active axillary, hilar, or mediastinal lymphadenopathy. ABDOMEN/PELVIS: No metabolically active intraperitoneal mass. No  metabolically active abdominal or pelvic lymphadenopathy. Physiologic  activity in the gastrointestinal and genitourinary systems. BONES/SOFT TISSUE: No abnormal FDG activity localizes to the bones. No  aggressive osseous lesion. INCIDENTAL CT FINDINGS: Multivessel coronary artery calcification. Aortic  atherosclerosis most pronounced in the infrarenal segment. Sigmoid  diverticulosis. Impression: 1. Redemonstration of left palatine tonsillar lesion which is FDG avid  consistent with history of malignancy. 2. Posterior right thyroid hypodense nodule which is FDG avid, recommend  further evaluation with dedicated ultrasound. 3. Left retrocardiac lower lobe nodular area of opacification as measured  above which demonstrates faint uptake and is favored to represent possible  pneumonia or other infectious/inflammatory process though  metastatic/neoplastic etiology not definitively excluded. Recommend post  treatment chest CT in 6-8 weeks to reassess. If persistent at time of  follow-up then further evaluation can be obtained with tissue sampling. PET 3/38/22  NIRADS score for Neck Primary: 1: No evidence of recurrence: routine   surveillance, CECT       NIRADS score for Neck Nodes: 1: No evidence of recurrence: routine   surveillance. There continues to be focal activity in a small nodule in the right thyroid   lobe. Left lower lobe pulmonary nodule with increasing FDG avidity.   This could   potentially represent a primary lung cancer or metastatic disease. Benign   etiologies remain a consideration       ASSESSMENT:    Valentino Kirk is a 79 y.o. pleasant male with oropharyngeal cancer and now with left lower lobe adenocarcinoma    Oropharyngeal cancer: Diagnosed oropharyngeal cancer, p16 positive, clinical stage T2 a N0 M0, stage I disease. Patient completed definitive radiation therapy and now was on surveillance   I reviewed the NCCN guidelines and goals of care with patient. Left lower lobe lung adenocarcinoma. Patient underwent left lower lobectomy on 5/23/2022 and final pathology showed T2AN0 disease with visceral pleural invasion and now currently on adjuvant systemic chemotherapy    During today's visit, the patient and the family had a number of reasonable questions which were answered to their satisfaction. They verbalized understanding of the information provided and they agreed to proceed as outlined above. PLAN:   I reviewed the laboratory data, imaging studies, discussed diagnosis and treatment recommendations  He has tolerated first cycle of chemotherapy well except some fatigue  Continue chemotherapy as planned for total 4 cycles  Return to clinic with cycle 2 or earlier if needed      Charles Guardado MD  Hematologist/Medical Oncologist    On this date 7/26/22  I have spent 40 minutes reviewing previous notes, test results and face to face with the patient discussing the diagnosis and importance of compliance with the treatment plan. Greater than 50% of that time was spent face-to-face with the patient in counseling and coordinating her care. This note is created with the assistance of a speech recognition program.  While intending to generate a document that actually reflects the content of the visit, the document can still have some errors including those of syntax and sound a like substitutions which may escape proof reading.   It such instances, actual meaning can be extrapolated by contextual diversion.     Blanca De Leon MD

## 2022-07-28 RX ORDER — ALBUTEROL SULFATE 90 UG/1
4 AEROSOL, METERED RESPIRATORY (INHALATION) PRN
Status: CANCELLED | OUTPATIENT
Start: 2022-08-09

## 2022-07-28 RX ORDER — PALONOSETRON 0.05 MG/ML
0.25 INJECTION, SOLUTION INTRAVENOUS ONCE
Status: CANCELLED | OUTPATIENT
Start: 2022-08-09 | End: 2022-08-09

## 2022-07-28 RX ORDER — SODIUM CHLORIDE 0.9 % (FLUSH) 0.9 %
5-40 SYRINGE (ML) INJECTION PRN
Status: CANCELLED | OUTPATIENT
Start: 2022-08-09

## 2022-07-28 RX ORDER — DIPHENHYDRAMINE HYDROCHLORIDE 50 MG/ML
50 INJECTION INTRAMUSCULAR; INTRAVENOUS
Status: CANCELLED | OUTPATIENT
Start: 2022-08-09

## 2022-07-28 RX ORDER — FAMOTIDINE 10 MG/ML
20 INJECTION, SOLUTION INTRAVENOUS
Status: CANCELLED | OUTPATIENT
Start: 2022-08-09

## 2022-07-28 RX ORDER — HEPARIN SODIUM (PORCINE) LOCK FLUSH IV SOLN 100 UNIT/ML 100 UNIT/ML
500 SOLUTION INTRAVENOUS PRN
Status: CANCELLED | OUTPATIENT
Start: 2022-08-09

## 2022-07-28 RX ORDER — EPINEPHRINE 1 MG/ML
0.3 INJECTION, SOLUTION, CONCENTRATE INTRAVENOUS PRN
Status: CANCELLED | OUTPATIENT
Start: 2022-08-09

## 2022-07-28 RX ORDER — ACETAMINOPHEN 325 MG/1
650 TABLET ORAL
Status: CANCELLED | OUTPATIENT
Start: 2022-08-09

## 2022-07-28 RX ORDER — SODIUM CHLORIDE 9 MG/ML
5-250 INJECTION, SOLUTION INTRAVENOUS PRN
Status: CANCELLED | OUTPATIENT
Start: 2022-08-09

## 2022-07-28 RX ORDER — SODIUM CHLORIDE 9 MG/ML
INJECTION, SOLUTION INTRAVENOUS CONTINUOUS
Status: CANCELLED | OUTPATIENT
Start: 2022-08-09

## 2022-07-28 RX ORDER — SODIUM CHLORIDE 9 MG/ML
5-40 INJECTION INTRAVENOUS PRN
Status: CANCELLED | OUTPATIENT
Start: 2022-08-09

## 2022-07-28 RX ORDER — ONDANSETRON 2 MG/ML
8 INJECTION INTRAMUSCULAR; INTRAVENOUS
Status: CANCELLED | OUTPATIENT
Start: 2022-08-09

## 2022-07-28 RX ORDER — MEPERIDINE HYDROCHLORIDE 50 MG/ML
12.5 INJECTION INTRAMUSCULAR; INTRAVENOUS; SUBCUTANEOUS PRN
Status: CANCELLED | OUTPATIENT
Start: 2022-08-09

## 2022-08-09 ENCOUNTER — OFFICE VISIT (OUTPATIENT)
Dept: ONCOLOGY | Age: 67
End: 2022-08-09
Payer: MEDICARE

## 2022-08-09 ENCOUNTER — TELEPHONE (OUTPATIENT)
Dept: ONCOLOGY | Age: 67
End: 2022-08-09

## 2022-08-09 ENCOUNTER — HOSPITAL ENCOUNTER (OUTPATIENT)
Dept: INFUSION THERAPY | Age: 67
Discharge: HOME | End: 2022-08-09
Payer: MEDICARE

## 2022-08-09 VITALS
BODY MASS INDEX: 25.36 KG/M2 | DIASTOLIC BLOOD PRESSURE: 65 MMHG | RESPIRATION RATE: 18 BRPM | TEMPERATURE: 98.2 F | HEART RATE: 59 BPM | SYSTOLIC BLOOD PRESSURE: 151 MMHG | WEIGHT: 166.8 LBS

## 2022-08-09 DIAGNOSIS — C34.32 PRIMARY MALIGNANT NEOPLASM OF LEFT LOWER LOBE OF LUNG (HCC): Primary | ICD-10-CM

## 2022-08-09 LAB
ABSOLUTE EOS #: 0 K/UL (ref 0–0.4)
ABSOLUTE LYMPH #: 0.9 K/UL (ref 1–4.8)
ABSOLUTE MONO #: 1.54 K/UL (ref 0.1–0.8)
ALBUMIN SERPL-MCNC: 4.5 G/DL (ref 3.5–5.2)
ALBUMIN/GLOBULIN RATIO: 2 (ref 1–2.5)
ALP BLD-CCNC: 72 U/L (ref 40–129)
ALT SERPL-CCNC: 16 U/L (ref 5–41)
ANION GAP SERPL CALCULATED.3IONS-SCNC: 13 MMOL/L (ref 9–17)
AST SERPL-CCNC: 18 U/L
BASOPHILS # BLD: 0 % (ref 0–2)
BASOPHILS ABSOLUTE: 0 K/UL (ref 0–0.2)
BILIRUB SERPL-MCNC: 0.29 MG/DL (ref 0.3–1.2)
BUN BLDV-MCNC: 20 MG/DL (ref 8–23)
CALCIUM SERPL-MCNC: 9.5 MG/DL (ref 8.6–10.4)
CHLORIDE BLD-SCNC: 102 MMOL/L (ref 98–107)
CO2: 22 MMOL/L (ref 20–31)
CREAT SERPL-MCNC: 0.77 MG/DL (ref 0.7–1.2)
EOSINOPHILS RELATIVE PERCENT: 0 % (ref 1–4)
GFR AFRICAN AMERICAN: >60 ML/MIN
GFR NON-AFRICAN AMERICAN: >60 ML/MIN
GFR SERPL CREATININE-BSD FRML MDRD: ABNORMAL ML/MIN/{1.73_M2}
GLUCOSE BLD-MCNC: 163 MG/DL (ref 70–99)
HCT VFR BLD CALC: 35 % (ref 41–53)
HEMOGLOBIN: 12 G/DL (ref 13.5–17.5)
LYMPHOCYTES # BLD: 14 % (ref 24–44)
MCH RBC QN AUTO: 30.1 PG (ref 26–34)
MCHC RBC AUTO-ENTMCNC: 34.3 G/DL (ref 31–37)
MCV RBC AUTO: 87.7 FL (ref 80–100)
MONOCYTES # BLD: 24 % (ref 1–7)
MORPHOLOGY: ABNORMAL
PDW BLD-RTO: 14.6 % (ref 12.5–15.4)
PLATELET # BLD: 496 K/UL (ref 140–450)
PMV BLD AUTO: 9.1 FL (ref 8–14)
POTASSIUM SERPL-SCNC: 4.4 MMOL/L (ref 3.7–5.3)
RBC # BLD: 3.99 M/UL (ref 4.5–5.9)
SEG NEUTROPHILS: 62 % (ref 36–66)
SEGMENTED NEUTROPHILS ABSOLUTE COUNT: 3.96 K/UL (ref 1.8–7.7)
SODIUM BLD-SCNC: 137 MMOL/L (ref 135–144)
TOTAL PROTEIN: 6.8 G/DL (ref 6.4–8.3)
WBC # BLD: 6.4 K/UL (ref 3.5–11)

## 2022-08-09 PROCEDURE — 36591 DRAW BLOOD OFF VENOUS DEVICE: CPT

## 2022-08-09 PROCEDURE — 96367 TX/PROPH/DG ADDL SEQ IV INF: CPT

## 2022-08-09 PROCEDURE — G8417 CALC BMI ABV UP PARAM F/U: HCPCS | Performed by: INTERNAL MEDICINE

## 2022-08-09 PROCEDURE — 1123F ACP DISCUSS/DSCN MKR DOCD: CPT | Performed by: INTERNAL MEDICINE

## 2022-08-09 PROCEDURE — 1036F TOBACCO NON-USER: CPT | Performed by: INTERNAL MEDICINE

## 2022-08-09 PROCEDURE — 85025 COMPLETE CBC W/AUTO DIFF WBC: CPT

## 2022-08-09 PROCEDURE — 96411 CHEMO IV PUSH ADDL DRUG: CPT

## 2022-08-09 PROCEDURE — G8427 DOCREV CUR MEDS BY ELIG CLIN: HCPCS | Performed by: INTERNAL MEDICINE

## 2022-08-09 PROCEDURE — 96375 TX/PRO/DX INJ NEW DRUG ADDON: CPT

## 2022-08-09 PROCEDURE — 3017F COLORECTAL CA SCREEN DOC REV: CPT | Performed by: INTERNAL MEDICINE

## 2022-08-09 PROCEDURE — 96413 CHEMO IV INFUSION 1 HR: CPT

## 2022-08-09 PROCEDURE — 6360000002 HC RX W HCPCS: Performed by: INTERNAL MEDICINE

## 2022-08-09 PROCEDURE — 99215 OFFICE O/P EST HI 40 MIN: CPT | Performed by: INTERNAL MEDICINE

## 2022-08-09 PROCEDURE — 80053 COMPREHEN METABOLIC PANEL: CPT

## 2022-08-09 PROCEDURE — 2580000003 HC RX 258: Performed by: INTERNAL MEDICINE

## 2022-08-09 RX ORDER — SODIUM CHLORIDE 0.9 % (FLUSH) 0.9 %
5-40 SYRINGE (ML) INJECTION PRN
Status: CANCELLED | OUTPATIENT
Start: 2022-08-30

## 2022-08-09 RX ORDER — ACETAMINOPHEN 325 MG/1
650 TABLET ORAL
Status: CANCELLED | OUTPATIENT
Start: 2022-08-30

## 2022-08-09 RX ORDER — ALBUTEROL SULFATE 90 UG/1
4 AEROSOL, METERED RESPIRATORY (INHALATION) PRN
Status: CANCELLED | OUTPATIENT
Start: 2022-08-30

## 2022-08-09 RX ORDER — FOLIC ACID 1 MG/1
1 TABLET ORAL DAILY
Qty: 90 TABLET | Refills: 3 | Status: SHIPPED | OUTPATIENT
Start: 2022-08-09

## 2022-08-09 RX ORDER — FAMOTIDINE 10 MG/ML
20 INJECTION, SOLUTION INTRAVENOUS
Status: CANCELLED | OUTPATIENT
Start: 2022-08-30

## 2022-08-09 RX ORDER — SODIUM CHLORIDE 0.9 % (FLUSH) 0.9 %
5-40 SYRINGE (ML) INJECTION PRN
Status: DISCONTINUED | OUTPATIENT
Start: 2022-08-09 | End: 2022-08-10 | Stop reason: HOSPADM

## 2022-08-09 RX ORDER — PALONOSETRON 0.05 MG/ML
0.25 INJECTION, SOLUTION INTRAVENOUS ONCE
Status: COMPLETED | OUTPATIENT
Start: 2022-08-09 | End: 2022-08-09

## 2022-08-09 RX ORDER — HEPARIN SODIUM (PORCINE) LOCK FLUSH IV SOLN 100 UNIT/ML 100 UNIT/ML
500 SOLUTION INTRAVENOUS PRN
Status: DISCONTINUED | OUTPATIENT
Start: 2022-08-09 | End: 2022-08-10 | Stop reason: HOSPADM

## 2022-08-09 RX ORDER — EPINEPHRINE 1 MG/ML
0.3 INJECTION, SOLUTION, CONCENTRATE INTRAVENOUS PRN
Status: CANCELLED | OUTPATIENT
Start: 2022-08-30

## 2022-08-09 RX ORDER — SODIUM CHLORIDE 9 MG/ML
5-40 INJECTION INTRAVENOUS PRN
Status: CANCELLED | OUTPATIENT
Start: 2022-08-30

## 2022-08-09 RX ORDER — SODIUM CHLORIDE 9 MG/ML
5-250 INJECTION, SOLUTION INTRAVENOUS PRN
Status: CANCELLED | OUTPATIENT
Start: 2022-08-30

## 2022-08-09 RX ORDER — DEXAMETHASONE SODIUM PHOSPHATE 10 MG/ML
10 INJECTION INTRAMUSCULAR; INTRAVENOUS ONCE
Status: COMPLETED | OUTPATIENT
Start: 2022-08-09 | End: 2022-08-09

## 2022-08-09 RX ORDER — DIPHENHYDRAMINE HYDROCHLORIDE 50 MG/ML
50 INJECTION INTRAMUSCULAR; INTRAVENOUS
Status: CANCELLED | OUTPATIENT
Start: 2022-08-30

## 2022-08-09 RX ORDER — HEPARIN SODIUM (PORCINE) LOCK FLUSH IV SOLN 100 UNIT/ML 100 UNIT/ML
500 SOLUTION INTRAVENOUS PRN
Status: CANCELLED | OUTPATIENT
Start: 2022-08-30

## 2022-08-09 RX ORDER — PALONOSETRON 0.05 MG/ML
0.25 INJECTION, SOLUTION INTRAVENOUS ONCE
Status: CANCELLED | OUTPATIENT
Start: 2022-08-30 | End: 2022-08-30

## 2022-08-09 RX ORDER — SODIUM CHLORIDE 9 MG/ML
5-250 INJECTION, SOLUTION INTRAVENOUS PRN
Status: DISCONTINUED | OUTPATIENT
Start: 2022-08-09 | End: 2022-08-10 | Stop reason: HOSPADM

## 2022-08-09 RX ORDER — MEPERIDINE HYDROCHLORIDE 50 MG/ML
12.5 INJECTION INTRAMUSCULAR; INTRAVENOUS; SUBCUTANEOUS PRN
Status: CANCELLED | OUTPATIENT
Start: 2022-08-30

## 2022-08-09 RX ORDER — SODIUM CHLORIDE 9 MG/ML
INJECTION, SOLUTION INTRAVENOUS CONTINUOUS
Status: CANCELLED | OUTPATIENT
Start: 2022-08-30

## 2022-08-09 RX ORDER — ONDANSETRON 2 MG/ML
8 INJECTION INTRAMUSCULAR; INTRAVENOUS
Status: CANCELLED | OUTPATIENT
Start: 2022-08-30

## 2022-08-09 RX ADMIN — PEMETREXED DISODIUM 1000 MG: 500 INJECTION, POWDER, LYOPHILIZED, FOR SOLUTION INTRAVENOUS at 11:42

## 2022-08-09 RX ADMIN — CARBOPLATIN 590 MG: 10 INJECTION INTRAVENOUS at 11:56

## 2022-08-09 RX ADMIN — FOSAPREPITANT 150 MG: 150 INJECTION, POWDER, LYOPHILIZED, FOR SOLUTION INTRAVENOUS at 11:09

## 2022-08-09 RX ADMIN — PALONOSETRON 0.25 MG: 0.05 INJECTION, SOLUTION INTRAVENOUS at 10:56

## 2022-08-09 RX ADMIN — SODIUM CHLORIDE, PRESERVATIVE FREE 10 ML: 5 INJECTION INTRAVENOUS at 12:58

## 2022-08-09 RX ADMIN — DEXAMETHASONE SODIUM PHOSPHATE 10 MG: 10 INJECTION INTRAMUSCULAR; INTRAVENOUS at 10:56

## 2022-08-09 RX ADMIN — SODIUM CHLORIDE 50 ML/HR: 9 INJECTION, SOLUTION INTRAVENOUS at 10:56

## 2022-08-09 RX ADMIN — HEPARIN 500 UNITS: 100 SYRINGE at 12:58

## 2022-08-09 RX ADMIN — SODIUM CHLORIDE, PRESERVATIVE FREE 10 ML: 5 INJECTION INTRAVENOUS at 10:01

## 2022-08-09 NOTE — TELEPHONE ENCOUNTER
Chemo as planned  RTc c3    Tx today as scheduled    RV scheduled 8/30/22 @ 9:15am    PT was given AVS and appt schedule    Electronically signed by Fredrick Rasmussen on 8/9/2022 at 3:28 PM

## 2022-08-09 NOTE — PROGRESS NOTES
Patient ID: Ysabel Collier, 1955, [id number], 79 y.o. Referred by : Parker Leonardo MD  Diagnosis:   Oropharyngeal carcin shirley, left tonsil squamous cell carcinoma, p16 positive, clinical stage T2 a N0 M0, stage I diseasePatient has been evaluated by ENT surgical oncology at [facility] and as per the tumor board recommendation definitive radiation therapy was recommended   Patient started on definitive radiation therapy on 11/9/2021   He  completed RT on December 29, 2021     Cancer Staging  Oropharyngeal cancer [facility])  Staging form: Pharynx - HPV-Mediated Oropharynx, AJCC 8th Edition  - Clinical stage from 10/12/2021: Stage I (cT2, cN0, cM0) - Signed by Houston Kaye MD on 10/12/2021    Primary malignant neoplasm of left lower lobe of lung ([facility] [address]  Staging form: Lung, AJCC 8th Edition  - Clinical stage from 4/6/2022: Stage IA2 (cT1b, cN0, cM0) - Signed by Gwen Mckinney MD on 4/13/2022    3/28/2022 had a PET scan which showed resolution of uptake in the tonsil and no active lymph node in the cervical region however 1.9 cm left lower lobe nodule noted with FDG activity. Patient had CT-guided biopsy on 4/6/2022 which showed adenocarcinoma  Patient underwent lower lobectomy on 5/23/2022. Final surgical pathology showed T2 a N0 M0, with invasion of the visceral pleura with extension to the pleural surface  7/19/22  started on adjuvant chemo with Amy Alimta      HISTORY OF PRESENT ILLNESS:    Oncologic History:  Ysabel Collier is 79 y.o. male was seen during initial consultation with for newly diagnosed left tonsillar/oropharyngeal carcinoma. Patient initially presented with sore throat/throat pain in May of this year and was referred to ENT for further evaluation. His ENT evaluation did show 3 cm left tonsillar mass limited to tonsils only. Patient had a CT of the neck on 9/22/2021 which showed 3.2 cm mass in the left palatine tonsil without any abnormal lymphadenopathy.   Patient was evaluated by ENT and had a biopsy of the left tonsil which showed poorly differentiated invasive squamous cell carcinoma, p16 positive. Subsequently patient had a PET scan on 10/6/2021 which showed no distant metastasis and no lymph node metastasis noted. Patient has been evaluated by radiation oncology  He denies any unintentional weight loss, drenching night sweats fever chills. INTERVAL HISTORY  Patient is returning for follow-up visit and to discuss lab results and further recommendations. He has tolerated chemotherapy well so far. He denies any abdominal pain, nausea matting. He does have some fatigue and tiredness. He denies any neuropathy. During this visit patient's allergy, social, medical, surgical history and medications were reviewed and updated. Past Medical History:   Diagnosis Date    COVID-19     home test, mild URI sx's, no hospitalization    Health care maintenance     PCP- Dannielle Ridley-  seen  6/2021    History of atrial fibrillation     follows with [facility], on eliquis    Hyperlipidemia     Hypertension     Lung cancer ([address]     Tonsillar cancer ([address] 09/2021    Tonsilar cancer. ENT -Dr Kiki Self ,       Past Surgical History:   Procedure Laterality Date    ACHILLES TENDON SURGERY      about 30 years ago     COLONOSCOPY      CT NEEDLE BIOPSY LUNG PERCUTANEOUS  4/6/2022    CT NEEDLE BIOPSY LUNG PERCUTANEOUS 4/6/2022 [facility] CT SCAN    IR PORT PLACEMENT EQUAL OR GREATER THAN 5 YEARS  7/12/2022    IR PORT PLACEMENT EQUAL OR GREATER THAN 5 YEARS 7/12/2022 Bran Salazar MD [facility] SPECIAL PROCEDURES    LOBECTOMY Left 05/23/2022    robotic video assisted thoracoscoopy, lower lobectomy with lymphnode dissection    LUNG REMOVAL, TOTAL Left 5/23/2022    XI ROBOTIC VIDEO ASSISTED THORACOSCOPY, LOWER LOBECTOMY performed by Tonio Weller MD at [place] [address] Left 05/23/2022       Allergies   Allergen Reactions    Seasonal Other (See Comments)     Runny nose scratchy throat       Current Outpatient Medications   Medication Sig Dispense Refill    folic acid (FOLVITE) 1 MG tablet Take 1 tablet by mouth in the morning. Start 7 days prior to chemotherapy. 90 tablet 3    lidocaine-prilocaine (EMLA) 2.5-2.5 % cream Apply topically 45-60 mins prior to needle poke daily PRN 1 each 2    metoprolol tartrate (LOPRESSOR) 25 MG tablet Take 1 tablet by mouth 2 times daily 60 tablet 3    lisinopril (PRINIVIL;ZESTRIL) 10 MG tablet Take 1 tablet by mouth daily 30 tablet 3    apixaban (ELIQUIS) 5 MG TABS tablet Take 1 tablet by mouth 2 times daily 120 tablet 4    famotidine (PEPCID) 20 MG tablet Take 1 tablet by mouth 2 times daily 60 tablet 3    simvastatin (ZOCOR) 20 MG tablet TAKE 1 TABLET BY MOUTH AT  NIGHT 90 tablet 3    omeprazole (PRILOSEC) 20 MG delayed release capsule Take 20 mg by mouth daily Pt takes 10mg daily      Multiple Vitamins-Minerals (THERAPEUTIC MULTIVITAMIN-MINERALS) tablet Take 1 tablet by mouth daily      ondansetron (ZOFRAN-ODT) 8 MG TBDP disintegrating tablet Place 1 tablet under the tongue 3 times daily as needed for Nausea or Vomiting (Patient not taking: Reported on 8/9/2022) 90 tablet 2    prochlorperazine (COMPAZINE) 10 MG tablet Take 1 tablet by mouth every 6 hours as needed (nausea vomiting) (Patient not taking: Reported on 8/9/2022) 120 tablet 2    amiodarone (CORDARONE) 200 MG tablet Take 1 tablet by mouth 2 times daily (Patient not taking: Reported on 8/9/2022) 30 tablet 1    lidocaine 4 % external patch Place 1 patch onto the skin daily (Patient not taking: Reported on 8/9/2022) 15 patch 0    aspirin 81 MG chewable tablet Take 81 mg by mouth daily  (Patient not taking: Reported on 8/9/2022)      hydroCHLOROthiazide (HYDRODIURIL) 12.5 MG tablet TAKE 1 TABLET BY MOUTH  DAILY 60 tablet 5     No current facility-administered medications for this visit.      Facility-Administered Medications Ordered in Other Visits   Medication Dose Route Frequency Provider Last Rate Last Admin    sodium chloride flush 0.9 % injection 5-40 mL  5-40 mL IntraVENous PRN Roger Wang MD   10 mL at 22 1001    heparin flush 100 UNIT/ML injection 500 Units  500 Units IntraCATHeter PRN Roger Wang MD        0.9 % sodium chloride infusion  5-250 mL/hr IntraVENous PRN Roger Wang MD 50 mL/hr at 22 1056 50 mL/hr at 22 1056    fosaprepitant (EMEND) 150 mg in sodium chloride 0.9 % 150 mL IVPB  150 mg IntraVENous Once Roger Wang MD        PEMEtrexed (ALIMTA) 1,000 mg in sodium chloride 0.9 % 100 mL chemo infusion  1,000 mg IntraVENous Once Roger Wang MD        CARBOplatin (PARAPLATIN) 590 mg in sodium chloride 0.9 % 250 mL chemo IVPB  590 mg IntraVENous Once Roger Wang MD           Social History     Socioeconomic History    Marital status:      Spouse name: Not on file    Number of children: Not on file    Years of education: Not on file    Highest education level: Not on file   Occupational History    Not on file   Tobacco Use    Smoking status: Former     Types: Cigarettes     Quit date: 3/30/2022     Years since quittin.3    Smokeless tobacco: Never    Tobacco comments:     states quit smoking \"40 yrs ago\"   Vaping Use    Vaping Use: Never used   Substance and Sexual Activity    Alcohol use: Yes     Comment: socially     Drug use: Not Currently    Sexual activity: Yes     Partners: Female   Other Topics Concern    Not on file   Social History Narrative    Not on file     Social Determinants of Health     Financial Resource Strain: Not on file   Food Insecurity: Not on file   Transportation Needs: Not on file   Physical Activity: Not on file   Stress: Not on file   Social Connections: Not on file   Intimate Partner Violence: Not on file   Housing Stability: Not on file       Family History   Problem Relation Age of Onset    Coronary Art Dis Mother     Cancer Father         REVIEW OF SYSTEM:     Constitutional: No fever or chills.  No night sweats, no weight loss   Eyes: No eye discharge, double vision, or eye pain   HEENT: negative for sore mouth, sore throat, hoarseness and voice change   Respiratory: negative for cough , sputum, dyspnea, wheezing, hemoptysis, chest pain   Cardiovascular: negative for chest pain, dyspnea, palpitations, orthopnea, PND   Gastrointestinal: negative for nausea, vomiting, diarrhea, constipation, abdominal pain, Dysphagia, hematemesis and hematochezia   Genitourinary: negative for frequency, dysuria, nocturia, urinary incontinence, and hematuria   Integument: negative for rash, skin lesions, bruises.    Hematologic/Lymphatic: negative for easy bruising, bleeding, lymphadenopathy, petechiae and swelling/edema   Endocrine: negative for heat or cold intolerance, tremor, weight changes, change in bowel habits and hair loss   Musculoskeletal: negative for myalgias, arthralgias, pain, joint swelling,and bone pain   Neurological: negative for headaches, dizziness, seizures, weakness, numbness       OBJECTIVE:         Vitals:    08/09/22 0953   BP: (!) 151/65   Pulse: 59   Resp: 18   Temp: 98.2 °F (36.8 °C)       PHYSICAL EXAM:   General appearance - well appearing, no in pain or distress   Mental status - alert and cooperative   Eyes - pupils equal and reactive, extraocular eye movements intact   Ears - bilateral TM's and external ear canals normal   Mouth - mucous membranes moist, pharynx normal without lesions   Neck - supple, no significant adenopathy   Lymphatics - no palpable lymphadenopathy, no hepatosplenomegaly   Chest - clear to auscultation, no wheezes, rales or rhonchi, symmetric air entry   Heart - normal rate, regular rhythm, normal S1, S2, no murmurs, rubs, clicks or gallops   Abdomen - soft, nontender, nondistended, no masses or organomegaly   Neurological - alert, oriented, normal speech, no focal findings or movement disorder noted   Musculoskeletal - no joint tenderness, deformity or swelling   Extremities - peripheral pulses normal, no pedal edema, no clubbing or cyanosis   Skin - normal coloration and turgor, no rashes, no suspicious skin lesions noted ,      LABORATORY DATA:     Lab Results   Component Value Date    WBC 6.4 08/09/2022    HGB 12.0 (L) 08/09/2022    HCT 35.0 (L) 08/09/2022    MCV 87.7 08/09/2022     (H) 08/09/2022    LYMPHOPCT 14 (L) 08/09/2022    RBC 3.99 (L) 08/09/2022    MCH 30.1 08/09/2022    MCHC 34.3 08/09/2022    RDW 14.6 08/09/2022    MONOPCT 24 (H) 08/09/2022    BASOPCT 0 08/09/2022    NEUTROABS 3.96 08/09/2022    LYMPHSABS 0.90 (L) 08/09/2022    MONOSABS 1.54 (H) 08/09/2022    EOSABS 0.00 08/09/2022    BASOSABS 0.00 08/09/2022         Chemistry        Component Value Date/Time     08/09/2022 1000    K 4.4 08/09/2022 1000     08/09/2022 1000    CO2 22 08/09/2022 1000    BUN 20 08/09/2022 1000    CREATININE 0.77 08/09/2022 1000        Component Value Date/Time    CALCIUM 9.5 08/09/2022 1000    ALKPHOS 72 08/09/2022 1000    AST 18 08/09/2022 1000    ALT 16 08/09/2022 1000    BILITOT 0.29 (L) 08/09/2022 1000        PATHOLOGY DATA:   10/1/2021  8:43 AM - Dangelo, Seren[place]n Incoming Lab Results From [facility]    Component Collected Lab   Surgical Pathology Report 09/27/2021 10:13  [address]   -- Diagnosis --   LEFT TONSIL, BIOPSIES:             -  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 Surgical Pathology Report  SURGICAL PATHOLOGY REPORT  Collected: 04/06/22 1230   Lab status: Final   Resulting lab: [facility]   Value: -- Diagnosis --   LUNG, LEFT LOWER LOBE, CT-GUIDED CORE NEEDLE BIOPSY:        -  ADENOCARCINOMA, CONSISTENT WITH LUNG PRIMARY.       Surgical Pathology Report  SURGICAL PATHOLOGY REPORT  Collected: 05/23/22 1235   Lab status: Final   Resulting lab: [facility]   Value: -- Diagnosis --   LUNG, LEFT LOWER LOBE, LOBECTOMY:   -LUNG INVASIVE ACINAR ADENOCARCINOMA, POORLY DIFFERENTIATED, SIZE 2.8   CM, WITH INVASION OF THE VISCERAL PLEURA WITH EXTENSION TO THE PLEURAL   SURFACE. -MARGINS NEGATIVE.     -FIVE (5) LYMPH NODES NEGATIVE FOR CARCINOMA. DISTANT SITE(S) INVOLVED, IF APPLICABLE: N/A. PATHOLOGIC STAGE CLASSIFICATION:     pT2a  pN0     IMAGING DATA:    PET CT SKULL BASE TO MID THIGH  Narrative: EXAMINATION:  WHOLE BODY PET/CT  10/6/2021    TECHNIQUE:  Following IV injection of 11.4 mCi of F 18 -FDG, PET  tumor imaging was  acquired from the base of the skull to the mid thighs. Computed tomography  was used for purposes of attenuation correction and anatomic localization. Fusion imaging was utilized for interpretation. Uptake time 59 mins. Glucose level 105 mg/dl. COMPARISON:  CT neck 09/22/2021    HISTORY:  Reason for Exam: Malignant neoplasm of tonsillar fossa  Acuity: Acute  Type of Exam: Initial    FINDINGS:  HEAD/NECK: Redemonstration of left palatine tonsillar lesion which is FDG  avid with max SUV of 12.0. No metabolically active cervical lymphadenopathy. 8 mm posterior right thyroid hypodense nodule which is FDG avid with max SUV  of 6.0. CHEST: Left retrocardiac lower lobe nodular area of opacification measuring 2  cm which demonstrates faint uptake with max SUV of 2.3. No metabolically  active axillary, hilar, or mediastinal lymphadenopathy. ABDOMEN/PELVIS: No metabolically active intraperitoneal mass. No  metabolically active abdominal or pelvic lymphadenopathy. Physiologic  activity in the gastrointestinal and genitourinary systems. BONES/SOFT TISSUE: No abnormal FDG activity localizes to the bones. No  aggressive osseous lesion. INCIDENTAL CT FINDINGS: Multivessel coronary artery calcification. Aortic  atherosclerosis most pronounced in the infrarenal segment. Sigmoid  diverticulosis. Impression: 1. Redemonstration of left palatine tonsillar lesion which is FDG avid  consistent with history of malignancy.   2. Posterior right thyroid hypodense nodule which is FDG avid, recommend  further evaluation with dedicated ultrasound. 3. Left retrocardiac lower lobe nodular area of opacification as measured  above which demonstrates faint uptake and is favored to represent possible  pneumonia or other infectious/inflammatory process though  metastatic/neoplastic etiology not definitively excluded. Recommend post  treatment chest CT in 6-8 weeks to reassess. If persistent at time of  follow-up then further evaluation can be obtained with tissue sampling. PET 3/38/22  NIRADS score for Neck Primary: 1: No evidence of recurrence: routine   surveillance, CECT       NIRADS score for Neck Nodes: 1: No evidence of recurrence: routine   surveillance. There continues to be focal activity in a small nodule in the right thyroid   lobe. Left lower lobe pulmonary nodule with increasing FDG avidity. This could   potentially represent a primary lung cancer or metastatic disease. Benign   etiologies remain a consideration       ASSESSMENT:    Jake Dixon is a 79 y.o. pleasant male with oropharyngeal cancer and now with left lower lobe adenocarcinoma    Oropharyngeal cancer: Diagnosed oropharyngeal cancer, p16 positive, clinical stage T2 a N0 M0, stage I disease. Patient completed definitive radiation therapy and now was on surveillance   I reviewed the NCCN guidelines and goals of care with patient. Left lower lobe lung adenocarcinoma. Patient underwent left lower lobectomy on 5/23/2022 and final pathology showed T2AN0 disease with visceral pleural invasion and now currently on adjuvant systemic chemotherapy    During today's visit, the patient and the family had a number of reasonable questions which were answered to their satisfaction. They verbalized understanding of the information provided and they agreed to proceed as outlined above.     PLAN:   I reviewed the laboratory data, discussed the diagnosis, prognosis and treatment recommendations  He is tolerating chemotherapy well. He does have mild fatigue  Proceed with chemotherapy as planned  Return to clinic with cycle 3 or earlier if needed        Charles Busch MD  Hematologist/Medical Oncologist    On this date 8/9/22  I have spent 40 minutes reviewing previous notes, test results and face to face with the patient discussing the diagnosis and importance of compliance with the treatment plan. Greater than 50% of that time was spent face-to-face with the patient in counseling and coordinating her care. This note is created with the assistance of a speech recognition program.  While intending to generate a document that actually reflects the content of the visit, the document can still have some errors including those of syntax and sound a like substitutions which may escape proof reading. It such instances, actual meaning can be extrapolated by contextual diversion.     Ambrosio Hendrix MD

## 2022-08-09 NOTE — PROGRESS NOTES
Patient here for [facility]. Labs drawn from [facility] and reviewed. He saw Dr. Smiley Rasmussen today see his dictation. He tolerated treatment well and ws discharged home in stable condition.   He is due to return 8/30 for C3D1

## 2022-08-30 ENCOUNTER — TELEPHONE (OUTPATIENT)
Dept: ONCOLOGY | Age: 67
End: 2022-08-30

## 2022-08-30 ENCOUNTER — HOSPITAL ENCOUNTER (OUTPATIENT)
Dept: INFUSION THERAPY | Age: 67
Discharge: HOME | End: 2022-08-30
Payer: MEDICARE

## 2022-08-30 ENCOUNTER — OFFICE VISIT (OUTPATIENT)
Dept: ONCOLOGY | Age: 67
End: 2022-08-30
Payer: MEDICARE

## 2022-08-30 VITALS
BODY MASS INDEX: 25.83 KG/M2 | HEART RATE: 56 BPM | SYSTOLIC BLOOD PRESSURE: 148 MMHG | DIASTOLIC BLOOD PRESSURE: 77 MMHG | WEIGHT: 169.9 LBS | TEMPERATURE: 97.7 F

## 2022-08-30 DIAGNOSIS — C10.9 OROPHARYNGEAL CANCER (HCC): Primary | ICD-10-CM

## 2022-08-30 DIAGNOSIS — C34.32 PRIMARY MALIGNANT NEOPLASM OF LEFT LOWER LOBE OF LUNG (HCC): ICD-10-CM

## 2022-08-30 DIAGNOSIS — C34.32 PRIMARY MALIGNANT NEOPLASM OF LEFT LOWER LOBE OF LUNG (HCC): Primary | ICD-10-CM

## 2022-08-30 LAB
ABSOLUTE EOS #: 0 K/UL (ref 0–0.4)
ABSOLUTE LYMPH #: 0.47 K/UL (ref 1–4.8)
ABSOLUTE MONO #: 0.4 K/UL (ref 0.1–0.8)
ALBUMIN SERPL-MCNC: 4.4 G/DL (ref 3.5–5.2)
ALBUMIN/GLOBULIN RATIO: 1.7 (ref 1–2.5)
ALP BLD-CCNC: 72 U/L (ref 40–129)
ALT SERPL-CCNC: 20 U/L (ref 5–41)
ANION GAP SERPL CALCULATED.3IONS-SCNC: 12 MMOL/L (ref 9–17)
AST SERPL-CCNC: 15 U/L
BASOPHILS # BLD: 0 % (ref 0–2)
BASOPHILS ABSOLUTE: 0 K/UL (ref 0–0.2)
BILIRUB SERPL-MCNC: 0.38 MG/DL (ref 0.3–1.2)
BUN BLDV-MCNC: 14 MG/DL (ref 8–23)
CALCIUM SERPL-MCNC: 9.8 MG/DL (ref 8.6–10.4)
CHLORIDE BLD-SCNC: 100 MMOL/L (ref 98–107)
CO2: 24 MMOL/L (ref 20–31)
CREAT SERPL-MCNC: 0.85 MG/DL (ref 0.7–1.2)
EOSINOPHILS RELATIVE PERCENT: 0 % (ref 1–4)
GFR AFRICAN AMERICAN: >60 ML/MIN
GFR NON-AFRICAN AMERICAN: >60 ML/MIN
GFR SERPL CREATININE-BSD FRML MDRD: ABNORMAL ML/MIN/{1.73_M2}
GLUCOSE BLD-MCNC: 183 MG/DL (ref 70–99)
HCT VFR BLD CALC: 34.6 % (ref 41–53)
HEMOGLOBIN: 12.2 G/DL (ref 13.5–17.5)
LYMPHOCYTES # BLD: 7 % (ref 24–44)
MCH RBC QN AUTO: 30.9 PG (ref 26–34)
MCHC RBC AUTO-ENTMCNC: 35.2 G/DL (ref 31–37)
MCV RBC AUTO: 87.8 FL (ref 80–100)
MONOCYTES # BLD: 6 % (ref 1–7)
MORPHOLOGY: NORMAL
PDW BLD-RTO: 16.2 % (ref 12.5–15.4)
PLATELET # BLD: 325 K/UL (ref 140–450)
PMV BLD AUTO: 6.9 FL (ref 6–12)
POTASSIUM SERPL-SCNC: 3.9 MMOL/L (ref 3.7–5.3)
RBC # BLD: 3.95 M/UL (ref 4.5–5.9)
SEG NEUTROPHILS: 87 % (ref 36–66)
SEGMENTED NEUTROPHILS ABSOLUTE COUNT: 5.83 K/UL (ref 1.8–7.7)
SODIUM BLD-SCNC: 136 MMOL/L (ref 135–144)
TOTAL PROTEIN: 7 G/DL (ref 6.4–8.3)
WBC # BLD: 6.7 K/UL (ref 3.5–11)

## 2022-08-30 PROCEDURE — 36415 COLL VENOUS BLD VENIPUNCTURE: CPT

## 2022-08-30 PROCEDURE — 6360000002 HC RX W HCPCS: Performed by: INTERNAL MEDICINE

## 2022-08-30 PROCEDURE — G8417 CALC BMI ABV UP PARAM F/U: HCPCS | Performed by: INTERNAL MEDICINE

## 2022-08-30 PROCEDURE — 85025 COMPLETE CBC W/AUTO DIFF WBC: CPT

## 2022-08-30 PROCEDURE — 96366 THER/PROPH/DIAG IV INF ADDON: CPT

## 2022-08-30 PROCEDURE — 1123F ACP DISCUSS/DSCN MKR DOCD: CPT | Performed by: INTERNAL MEDICINE

## 2022-08-30 PROCEDURE — 96367 TX/PROPH/DG ADDL SEQ IV INF: CPT

## 2022-08-30 PROCEDURE — 36591 DRAW BLOOD OFF VENOUS DEVICE: CPT

## 2022-08-30 PROCEDURE — 96375 TX/PRO/DX INJ NEW DRUG ADDON: CPT

## 2022-08-30 PROCEDURE — 99211 OFF/OP EST MAY X REQ PHY/QHP: CPT | Performed by: INTERNAL MEDICINE

## 2022-08-30 PROCEDURE — 3017F COLORECTAL CA SCREEN DOC REV: CPT | Performed by: INTERNAL MEDICINE

## 2022-08-30 PROCEDURE — 99215 OFFICE O/P EST HI 40 MIN: CPT | Performed by: INTERNAL MEDICINE

## 2022-08-30 PROCEDURE — G8427 DOCREV CUR MEDS BY ELIG CLIN: HCPCS | Performed by: INTERNAL MEDICINE

## 2022-08-30 PROCEDURE — 80053 COMPREHEN METABOLIC PANEL: CPT

## 2022-08-30 PROCEDURE — 2580000003 HC RX 258: Performed by: INTERNAL MEDICINE

## 2022-08-30 PROCEDURE — 96413 CHEMO IV INFUSION 1 HR: CPT

## 2022-08-30 PROCEDURE — 96411 CHEMO IV PUSH ADDL DRUG: CPT

## 2022-08-30 PROCEDURE — 1036F TOBACCO NON-USER: CPT | Performed by: INTERNAL MEDICINE

## 2022-08-30 RX ORDER — HEPARIN SODIUM (PORCINE) LOCK FLUSH IV SOLN 100 UNIT/ML 100 UNIT/ML
500 SOLUTION INTRAVENOUS PRN
Status: DISCONTINUED | OUTPATIENT
Start: 2022-08-30 | End: 2022-08-31 | Stop reason: HOSPADM

## 2022-08-30 RX ORDER — ONDANSETRON 2 MG/ML
8 INJECTION INTRAMUSCULAR; INTRAVENOUS
Status: CANCELLED | OUTPATIENT
Start: 2022-09-20

## 2022-08-30 RX ORDER — HEPARIN SODIUM (PORCINE) LOCK FLUSH IV SOLN 100 UNIT/ML 100 UNIT/ML
500 SOLUTION INTRAVENOUS PRN
Status: CANCELLED | OUTPATIENT
Start: 2022-09-20

## 2022-08-30 RX ORDER — PALONOSETRON 0.05 MG/ML
0.25 INJECTION, SOLUTION INTRAVENOUS ONCE
Status: CANCELLED | OUTPATIENT
Start: 2022-09-20 | End: 2022-09-20

## 2022-08-30 RX ORDER — SODIUM CHLORIDE 0.9 % (FLUSH) 0.9 %
5-40 SYRINGE (ML) INJECTION PRN
Status: CANCELLED | OUTPATIENT
Start: 2022-09-20

## 2022-08-30 RX ORDER — ALBUTEROL SULFATE 90 UG/1
4 AEROSOL, METERED RESPIRATORY (INHALATION) PRN
Status: CANCELLED | OUTPATIENT
Start: 2022-09-20

## 2022-08-30 RX ORDER — SODIUM CHLORIDE 9 MG/ML
5-40 INJECTION INTRAVENOUS PRN
Status: CANCELLED | OUTPATIENT
Start: 2022-09-20

## 2022-08-30 RX ORDER — FLECAINIDE ACETATE 50 MG/1
TABLET ORAL
COMMUNITY
Start: 2022-06-15

## 2022-08-30 RX ORDER — CYANOCOBALAMIN 1000 UG/ML
1000 INJECTION INTRAMUSCULAR; SUBCUTANEOUS ONCE
Status: CANCELLED | OUTPATIENT
Start: 2022-09-20 | End: 2022-09-20

## 2022-08-30 RX ORDER — SODIUM CHLORIDE 9 MG/ML
INJECTION, SOLUTION INTRAVENOUS CONTINUOUS
Status: CANCELLED | OUTPATIENT
Start: 2022-09-20

## 2022-08-30 RX ORDER — PALONOSETRON 0.05 MG/ML
0.25 INJECTION, SOLUTION INTRAVENOUS ONCE
Status: COMPLETED | OUTPATIENT
Start: 2022-08-30 | End: 2022-08-30

## 2022-08-30 RX ORDER — SODIUM CHLORIDE 9 MG/ML
5-250 INJECTION, SOLUTION INTRAVENOUS PRN
Status: DISCONTINUED | OUTPATIENT
Start: 2022-08-30 | End: 2022-08-31 | Stop reason: HOSPADM

## 2022-08-30 RX ORDER — FAMOTIDINE 10 MG/ML
20 INJECTION, SOLUTION INTRAVENOUS
Status: CANCELLED | OUTPATIENT
Start: 2022-09-20

## 2022-08-30 RX ORDER — SODIUM CHLORIDE 9 MG/ML
5-250 INJECTION, SOLUTION INTRAVENOUS PRN
Status: CANCELLED | OUTPATIENT
Start: 2022-09-20

## 2022-08-30 RX ORDER — SODIUM CHLORIDE 0.9 % (FLUSH) 0.9 %
5-40 SYRINGE (ML) INJECTION PRN
Status: DISCONTINUED | OUTPATIENT
Start: 2022-08-30 | End: 2022-08-31 | Stop reason: HOSPADM

## 2022-08-30 RX ORDER — EPINEPHRINE 1 MG/ML
0.3 INJECTION, SOLUTION, CONCENTRATE INTRAVENOUS PRN
Status: CANCELLED | OUTPATIENT
Start: 2022-09-20

## 2022-08-30 RX ORDER — DEXAMETHASONE SODIUM PHOSPHATE 10 MG/ML
10 INJECTION INTRAMUSCULAR; INTRAVENOUS ONCE
Status: COMPLETED | OUTPATIENT
Start: 2022-08-30 | End: 2022-08-30

## 2022-08-30 RX ORDER — DIPHENHYDRAMINE HYDROCHLORIDE 50 MG/ML
50 INJECTION INTRAMUSCULAR; INTRAVENOUS
Status: CANCELLED | OUTPATIENT
Start: 2022-09-20

## 2022-08-30 RX ORDER — MEPERIDINE HYDROCHLORIDE 50 MG/ML
12.5 INJECTION INTRAMUSCULAR; INTRAVENOUS; SUBCUTANEOUS PRN
Status: CANCELLED | OUTPATIENT
Start: 2022-09-20

## 2022-08-30 RX ORDER — ACETAMINOPHEN 325 MG/1
650 TABLET ORAL
Status: CANCELLED | OUTPATIENT
Start: 2022-09-20

## 2022-08-30 RX ADMIN — PEMETREXED DISODIUM 1000 MG: 500 INJECTION, POWDER, LYOPHILIZED, FOR SOLUTION INTRAVENOUS at 11:16

## 2022-08-30 RX ADMIN — FOSAPREPITANT 150 MG: 150 INJECTION, POWDER, LYOPHILIZED, FOR SOLUTION INTRAVENOUS at 10:29

## 2022-08-30 RX ADMIN — SODIUM CHLORIDE 150 ML/HR: 9 INJECTION, SOLUTION INTRAVENOUS at 10:19

## 2022-08-30 RX ADMIN — DEXAMETHASONE SODIUM PHOSPHATE 10 MG: 10 INJECTION INTRAMUSCULAR; INTRAVENOUS at 10:20

## 2022-08-30 RX ADMIN — SODIUM CHLORIDE, PRESERVATIVE FREE 10 ML: 5 INJECTION INTRAVENOUS at 12:35

## 2022-08-30 RX ADMIN — CARBOPLATIN 550 MG: 10 INJECTION INTRAVENOUS at 11:29

## 2022-08-30 RX ADMIN — PALONOSETRON 0.25 MG: 0.05 INJECTION, SOLUTION INTRAVENOUS at 10:19

## 2022-08-30 RX ADMIN — HEPARIN 500 UNITS: 100 SYRINGE at 12:35

## 2022-08-30 NOTE — TELEPHONE ENCOUNTER
Chemo after labs  RTc 3 weeks    Tx today as scheduled    RV scheduled 9/20/22 @ 8:45am w/ tx to follow    PT was given AVS and appt schedule    Electronically signed by Bonnie Salcedo on 8/30/2022 at 10:33 AM

## 2022-08-30 NOTE — PATIENT INSTRUCTIONS
Chemo after labs   RTc 3 weeks
[FreeTextEntry1] : 49  year old MRDD male presents for xcontinued care of nail dystrophy.

## 2022-08-30 NOTE — PROGRESS NOTES
Patient arrived for C3D1 alimta/carbo. Pt had visit with Dr Olivier Pryor, labs reviewed and within treatable limits, received order to proceed with treatment today. Treatment completed without issue. Pt stable at discharge. Scheduled to return 9/20/22 for MD visit and C4D1.

## 2022-08-30 NOTE — PROGRESS NOTES
Patient ID: Thomas Elizabeth, 1955, [id number], 79 y.o. Referred by : Tomeka Miramontes MD  Diagnosis:   Oropharyngeal carcin shirley, left tonsil squamous cell carcinoma, p16 positive, clinical stage T2 a N0 M0, stage I diseasePatient has been evaluated by ENT surgical oncology at [facility] and as per the tumor board recommendation definitive radiation therapy was recommended   Patient started on definitive radiation therapy on 11/9/2021   He  completed RT on December 29, 2021     Cancer Staging  Oropharyngeal cancer [facility])  Staging form: Pharynx - HPV-Mediated Oropharynx, AJCC 8th Edition  - Clinical stage from 10/12/2021: Stage I (cT2, cN0, cM0) - Signed by Erica Hernández MD on 10/12/2021    Primary malignant neoplasm of left lower lobe of lung ([facility] [address]  Staging form: Lung, AJCC 8th Edition  - Clinical stage from 4/6/2022: Stage IA2 (cT1b, cN0, cM0) - Signed by Bertha Masterson MD on 4/13/2022    3/28/2022 had a PET scan which showed resolution of uptake in the tonsil and no active lymph node in the cervical region however 1.9 cm left lower lobe nodule noted with FDG activity. Patient had CT-guided biopsy on 4/6/2022 which showed adenocarcinoma  Patient underwent lower lobectomy on 5/23/2022. Final surgical pathology showed T2 a N0 M0, with invasion of the visceral pleura with extension to the pleural surface  7/19/22  started on adjuvant chemo with Eldalyjeanne Menendez Alimtlázaro      HISTORY OF PRESENT ILLNESS:    Oncologic History:  Thomas Elizabeth is 79 y.o. male was seen during initial consultation with for newly diagnosed left tonsillar/oropharyngeal carcinoma. Patient initially presented with sore throat/throat pain in May of this year and was referred to ENT for further evaluation. His ENT evaluation did show 3 cm left tonsillar mass limited to tonsils only. Patient had a CT of the neck on 9/22/2021 which showed 3.2 cm mass in the left palatine tonsil without any abnormal lymphadenopathy.   Patient was evaluated by ENT and had a biopsy of the left tonsil which showed poorly differentiated invasive squamous cell carcinoma, p16 positive. Subsequently patient had a PET scan on 10/6/2021 which showed no distant metastasis and no lymph node metastasis noted. Patient has been evaluated by radiation oncology  He denies any unintentional weight loss, drenching night sweats fever chills. INTERVAL HISTORY  Patient is returning for follow-up visit and to discuss lab results and further recommendations. He has completed 2 cycles of chemotherapy and tolerated well. He reported fatigue during first week of chemotherapy. He denies any abdominal pain nausea vomiting. He is eating and drinking well. He denies any chest pain shortness of breath cough hemoptysis. During this visit patient's allergy, social, medical, surgical history and medications were reviewed and updated. Past Medical History:   Diagnosis Date    COVID-19     home test, mild URI sx's, no hospitalization    Health care maintenance     PCP- Arminda Solo-  seen  6/2021    History of atrial fibrillation     follows with [facility], on eliquis    Hyperlipidemia     Hypertension     Lung cancer ([address]     Tonsillar cancer ([facility] [address] 09/2021    Tonsilar cancer. ENT -Dr Tafoya Kidney ,       Past Surgical History:   Procedure Laterality Date    ACHILLES TENDON SURGERY      about 30 years ago     COLONOSCOPY      CT NEEDLE BIOPSY LUNG PERCUTANEOUS  4/6/2022    CT NEEDLE BIOPSY LUNG PERCUTANEOUS 4/6/2022 [facility] CT SCAN    IR PORT PLACEMENT EQUAL OR GREATER THAN 5 YEARS  7/12/2022    IR PORT PLACEMENT EQUAL OR GREATER THAN 5 YEARS 7/12/2022 Briana Hollingsworth MD [facility] SPECIAL PROCEDURES    LOBECTOMY Left 05/23/2022    robotic video assisted thoracoscoopy, lower lobectomy with lymphnode dissection    LUNG REMOVAL, TOTAL Left 5/23/2022    XI ROBOTIC VIDEO ASSISTED THORACOSCOPY, LOWER LOBECTOMY performed by Edie Leary MD at [place] [id number] Left 05/23/2022       Allergies Allergen Reactions    Seasonal Other (See Comments)     Runny nose scratchy throat       Current Outpatient Medications   Medication Sig Dispense Refill    flecainide (TAMBOCOR) 50 MG tablet       folic acid (FOLVITE) 1 MG tablet Take 1 tablet by mouth in the morning. Start 7 days prior to chemotherapy. 90 tablet 3    lidocaine-prilocaine (EMLA) 2.5-2.5 % cream Apply topically 45-60 mins prior to needle poke daily PRN 1 each 2    metoprolol tartrate (LOPRESSOR) 25 MG tablet Take 1 tablet by mouth 2 times daily 60 tablet 3    lisinopril (PRINIVIL;ZESTRIL) 10 MG tablet Take 1 tablet by mouth daily 30 tablet 3    apixaban (ELIQUIS) 5 MG TABS tablet Take 1 tablet by mouth 2 times daily 120 tablet 4    famotidine (PEPCID) 20 MG tablet Take 1 tablet by mouth 2 times daily 60 tablet 3    simvastatin (ZOCOR) 20 MG tablet TAKE 1 TABLET BY MOUTH AT  NIGHT 90 tablet 3    hydroCHLOROthiazide (HYDRODIURIL) 12.5 MG tablet TAKE 1 TABLET BY MOUTH  DAILY 60 tablet 5    omeprazole (PRILOSEC) 20 MG delayed release capsule Take 20 mg by mouth daily Pt takes 10mg daily      Multiple Vitamins-Minerals (THERAPEUTIC MULTIVITAMIN-MINERALS) tablet Take 1 tablet by mouth daily      ondansetron (ZOFRAN-ODT) 8 MG TBDP disintegrating tablet Place 1 tablet under the tongue 3 times daily as needed for Nausea or Vomiting (Patient not taking: No sig reported) 90 tablet 2    prochlorperazine (COMPAZINE) 10 MG tablet Take 1 tablet by mouth every 6 hours as needed (nausea vomiting) (Patient not taking: No sig reported) 120 tablet 2    lidocaine 4 % external patch Place 1 patch onto the skin daily (Patient not taking: No sig reported) 15 patch 0    aspirin 81 MG chewable tablet Take 81 mg by mouth daily  (Patient not taking: No sig reported)       No current facility-administered medications for this visit.        Social History     Socioeconomic History    Marital status:      Spouse name: Not on file    Number of children: Not on file    Years of education: Not on file    Highest education level: Not on file   Occupational History    Not on file   Tobacco Use    Smoking status: Former     Types: Cigarettes     Quit date: 3/30/2022     Years since quittin.4    Smokeless tobacco: Never    Tobacco comments:     states quit smoking \"40 yrs ago\"   Vaping Use    Vaping Use: Never used   Substance and Sexual Activity    Alcohol use: Yes     Comment: socially     Drug use: Not Currently    Sexual activity: Yes     Partners: Female   Other Topics Concern    Not on file   Social History Narrative    Not on file     Social Determinants of Health     Financial Resource Strain: Not on file   Food Insecurity: Not on file   Transportation Needs: Not on file   Physical Activity: Not on file   Stress: Not on file   Social Connections: Not on file   Intimate Partner Violence: Not on file   Housing Stability: Not on file       Family History   Problem Relation Age of Onset    Coronary Art Dis Mother     Cancer Father         REVIEW OF SYSTEM:     Constitutional: No fever or chills. No night sweats, no weight loss   Eyes: No eye discharge, double vision, or eye pain   HEENT: negative for sore mouth, sore throat, hoarseness and voice change   Respiratory: negative for cough , sputum, dyspnea, wheezing, hemoptysis, chest pain   Cardiovascular: negative for chest pain, dyspnea, palpitations, orthopnea, PND   Gastrointestinal: negative for nausea, vomiting, diarrhea, constipation, abdominal pain, Dysphagia, hematemesis and hematochezia   Genitourinary: negative for frequency, dysuria, nocturia, urinary incontinence, and hematuria   Integument: negative for rash, skin lesions, bruises.    Hematologic/Lymphatic: negative for easy bruising, bleeding, lymphadenopathy, petechiae and swelling/edema   Endocrine: negative for heat or cold intolerance, tremor, weight changes, change in bowel habits and hair loss   Musculoskeletal: negative for myalgias, arthralgias, pain, joint swelling,and bone pain   Neurological: negative for headaches, dizziness, seizures, weakness, numbness       OBJECTIVE:         Vitals:    08/30/22 0930   BP: (!) 148/77   Pulse: 56   Temp: 97.7 °F (36.5 °C)       PHYSICAL EXAM:   General appearance - well appearing, no in pain or distress   Mental status - alert and cooperative   Eyes - pupils equal and reactive, extraocular eye movements intact   Ears - bilateral TM's and external ear canals normal   Mouth - mucous membranes moist, pharynx normal without lesions   Neck - supple, no significant adenopathy   Lymphatics - no palpable lymphadenopathy, no hepatosplenomegaly   Chest - clear to auscultation, no wheezes, rales or rhonchi, symmetric air entry   Heart - normal rate, regular rhythm, normal S1, S2, no murmurs, rubs, clicks or gallops   Abdomen - soft, nontender, nondistended, no masses or organomegaly   Neurological - alert, oriented, normal speech, no focal findings or movement disorder noted   Musculoskeletal - no joint tenderness, deformity or swelling   Extremities - peripheral pulses normal, no pedal edema, no clubbing or cyanosis   Skin - normal coloration and turgor, no rashes, no suspicious skin lesions noted ,      LABORATORY DATA:     Lab Results   Component Value Date    WBC 6.4 08/09/2022    HGB 12.0 (L) 08/09/2022    HCT 35.0 (L) 08/09/2022    MCV 87.7 08/09/2022     (H) 08/09/2022    LYMPHOPCT 14 (L) 08/09/2022    RBC 3.99 (L) 08/09/2022    MCH 30.1 08/09/2022    MCHC 34.3 08/09/2022    RDW 14.6 08/09/2022    MONOPCT 24 (H) 08/09/2022    BASOPCT 0 08/09/2022    NEUTROABS 3.96 08/09/2022    LYMPHSABS 0.90 (L) 08/09/2022    MONOSABS 1.54 (H) 08/09/2022    EOSABS 0.00 08/09/2022    BASOSABS 0.00 08/09/2022         Chemistry        Component Value Date/Time     08/09/2022 1000    K 4.4 08/09/2022 1000     08/09/2022 1000    CO2 22 08/09/2022 1000    BUN 20 08/09/2022 1000    CREATININE 0.77 08/09/2022 1000        Component Value Date/Time    CALCIUM 9.5 08/09/2022 1000    ALKPHOS 72 08/09/2022 1000    AST 18 08/09/2022 1000    ALT 16 08/09/2022 1000    BILITOT 0.29 (L) 08/09/2022 1000        PATHOLOGY DATA:   10/1/2021  8:43 AM - Dangelo, August Incoming Lab Results From [facility]    Component Collected Lab   Surgical Pathology Report 09/27/2021 10:13  [address]   -- Diagnosis --   LEFT TONSIL, BIOPSIES:             -  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 Surgical Pathology Report  SURGICAL PATHOLOGY REPORT  Collected: 04/06/22 1230   Lab status: Final   Resulting lab: [facility]   Value: -- Diagnosis --   LUNG, LEFT LOWER LOBE, CT-GUIDED CORE NEEDLE BIOPSY:        -  ADENOCARCINOMA, CONSISTENT WITH LUNG PRIMARY. Surgical Pathology Report  SURGICAL PATHOLOGY REPORT  Collected: 05/23/22 1235   Lab status: Final   Resulting lab: [facility]   Value: -- Diagnosis --   LUNG, LEFT LOWER LOBE, LOBECTOMY:   -LUNG INVASIVE ACINAR ADENOCARCINOMA, POORLY DIFFERENTIATED, SIZE 2.8   CM, WITH INVASION OF THE VISCERAL PLEURA WITH EXTENSION TO THE PLEURAL   SURFACE. -MARGINS NEGATIVE.     -FIVE (5) LYMPH NODES NEGATIVE FOR CARCINOMA. DISTANT SITE(S) INVOLVED, IF APPLICABLE: N/A. PATHOLOGIC STAGE CLASSIFICATION:     pT2a  pN0     IMAGING DATA:    PET CT SKULL BASE TO MID THIGH  Narrative: EXAMINATION:  WHOLE BODY PET/CT  10/6/2021    TECHNIQUE:  Following IV injection of 11.4 mCi of F 18 -FDG, PET  tumor imaging was  acquired from the base of the skull to the mid thighs. Computed tomography  was used for purposes of attenuation correction and anatomic localization. Fusion imaging was utilized for interpretation. Uptake time 59 mins. Glucose level 105 mg/dl.     COMPARISON:  CT neck 09/22/2021    HISTORY:  Reason for Exam: Malignant neoplasm of tonsillar fossa  Acuity: Acute  Type of Exam: Initial    FINDINGS:  HEAD/NECK: Redemonstration of left palatine tonsillar lesion which is FDG  avid with max SUV of 12.0. No metabolically active cervical lymphadenopathy. 8 mm posterior right thyroid hypodense nodule which is FDG avid with max SUV  of 6.0. CHEST: Left retrocardiac lower lobe nodular area of opacification measuring 2  cm which demonstrates faint uptake with max SUV of 2.3. No metabolically  active axillary, hilar, or mediastinal lymphadenopathy. ABDOMEN/PELVIS: No metabolically active intraperitoneal mass. No  metabolically active abdominal or pelvic lymphadenopathy. Physiologic  activity in the gastrointestinal and genitourinary systems. BONES/SOFT TISSUE: No abnormal FDG activity localizes to the bones. No  aggressive osseous lesion. INCIDENTAL CT FINDINGS: Multivessel coronary artery calcification. Aortic  atherosclerosis most pronounced in the infrarenal segment. Sigmoid  diverticulosis. Impression: 1. Redemonstration of left palatine tonsillar lesion which is FDG avid  consistent with history of malignancy. 2. Posterior right thyroid hypodense nodule which is FDG avid, recommend  further evaluation with dedicated ultrasound. 3. Left retrocardiac lower lobe nodular area of opacification as measured  above which demonstrates faint uptake and is favored to represent possible  pneumonia or other infectious/inflammatory process though  metastatic/neoplastic etiology not definitively excluded. Recommend post  treatment chest CT in 6-8 weeks to reassess. If persistent at time of  follow-up then further evaluation can be obtained with tissue sampling. PET 3/38/22  NIRADS score for Neck Primary: 1: No evidence of recurrence: routine   surveillance, CECT       NIRADS score for Neck Nodes: 1: No evidence of recurrence: routine   surveillance. There continues to be focal activity in a small nodule in the right thyroid   lobe.        Left lower lobe pulmonary nodule with increasing FDG avidity. This could   potentially represent a primary lung cancer or metastatic disease. Benign   etiologies remain a consideration       ASSESSMENT:    Ysabel Collier is a 79 y.o. pleasant male with oropharyngeal cancer and now with left lower lobe adenocarcinoma    Oropharyngeal cancer: Diagnosed oropharyngeal cancer, p16 positive, clinical stage T2 a N0 M0, stage I disease. Patient completed definitive radiation therapy and now was on surveillance   I reviewed the NCCN guidelines and goals of care with patient. Left lower lobe lung adenocarcinoma. Patient underwent left lower lobectomy on 5/23/2022 and final pathology showed T2AN0 disease with visceral pleural invasion and now currently on adjuvant systemic chemotherapy    During today's visit, the patient and the family had a number of reasonable questions which were answered to their satisfaction. They verbalized understanding of the information provided and they agreed to proceed as outlined above. PLAN:   I reviewed his recent lab work, discussed diagnosis and treatment recommendations   He is tolerating chemotherapy well   Plan to continue chemotherapy as scheduled after the labs   Return to clinic with cycle 4 or earlier if needed       Charles Davis MD  Hematologist/Medical Oncologist    On this date 8/30/22  I have spent 40 minutes reviewing previous notes, test results and face to face with the patient discussing the diagnosis and importance of compliance with the treatment plan. Greater than 50% of that time was spent face-to-face with the patient in counseling and coordinating her care. This note is created with the assistance of a speech recognition program.  While intending to generate a document that actually reflects the content of the visit, the document can still have some errors including those of syntax and sound a like substitutions which may escape proof reading.   It such instances, actual meaning can be extrapolated by contextual diversion.     Gabrielle Painting MD

## 2022-09-20 ENCOUNTER — OFFICE VISIT (OUTPATIENT)
Dept: ONCOLOGY | Age: 67
End: 2022-09-20
Payer: MEDICARE

## 2022-09-20 ENCOUNTER — HOSPITAL ENCOUNTER (OUTPATIENT)
Dept: INFUSION THERAPY | Age: 67
Discharge: HOME | End: 2022-09-20
Payer: MEDICARE

## 2022-09-20 VITALS
TEMPERATURE: 97.7 F | WEIGHT: 170 LBS | SYSTOLIC BLOOD PRESSURE: 147 MMHG | HEART RATE: 69 BPM | BODY MASS INDEX: 25.85 KG/M2 | DIASTOLIC BLOOD PRESSURE: 88 MMHG

## 2022-09-20 DIAGNOSIS — C10.9 OROPHARYNGEAL CANCER (HCC): Primary | ICD-10-CM

## 2022-09-20 DIAGNOSIS — C34.32 PRIMARY MALIGNANT NEOPLASM OF LEFT LOWER LOBE OF LUNG (HCC): ICD-10-CM

## 2022-09-20 DIAGNOSIS — C34.32 PRIMARY MALIGNANT NEOPLASM OF LEFT LOWER LOBE OF LUNG (HCC): Primary | ICD-10-CM

## 2022-09-20 LAB
ABSOLUTE EOS #: 0 K/UL (ref 0–0.4)
ABSOLUTE LYMPH #: 0.45 K/UL (ref 1–4.8)
ABSOLUTE MONO #: 0.3 K/UL (ref 0.1–0.8)
ALBUMIN SERPL-MCNC: 4.4 G/DL (ref 3.5–5.2)
ALBUMIN/GLOBULIN RATIO: 1.7 (ref 1–2.5)
ALP BLD-CCNC: 68 U/L (ref 40–129)
ALT SERPL-CCNC: 20 U/L (ref 5–41)
ANION GAP SERPL CALCULATED.3IONS-SCNC: 14 MMOL/L (ref 9–17)
AST SERPL-CCNC: 23 U/L
BASOPHILS # BLD: 0 % (ref 0–2)
BASOPHILS ABSOLUTE: 0 K/UL (ref 0–0.2)
BILIRUB SERPL-MCNC: 0.4 MG/DL (ref 0.3–1.2)
BUN BLDV-MCNC: 16 MG/DL (ref 8–23)
CALCIUM SERPL-MCNC: 9.6 MG/DL (ref 8.6–10.4)
CHLORIDE BLD-SCNC: 97 MMOL/L (ref 98–107)
CO2: 26 MMOL/L (ref 20–31)
CREAT SERPL-MCNC: 0.97 MG/DL (ref 0.7–1.2)
EOSINOPHILS RELATIVE PERCENT: 0 % (ref 1–4)
GFR AFRICAN AMERICAN: >60 ML/MIN
GFR NON-AFRICAN AMERICAN: >60 ML/MIN
GFR SERPL CREATININE-BSD FRML MDRD: ABNORMAL ML/MIN/{1.73_M2}
GLUCOSE BLD-MCNC: 233 MG/DL (ref 70–99)
HCT VFR BLD CALC: 32.6 % (ref 41–53)
HEMOGLOBIN: 11.4 G/DL (ref 13.5–17.5)
LYMPHOCYTES # BLD: 9 % (ref 24–44)
MCH RBC QN AUTO: 31.4 PG (ref 26–34)
MCHC RBC AUTO-ENTMCNC: 35.1 G/DL (ref 31–37)
MCV RBC AUTO: 89.5 FL (ref 80–100)
MONOCYTES # BLD: 6 % (ref 1–7)
MORPHOLOGY: ABNORMAL
PDW BLD-RTO: 18.7 % (ref 12.5–15.4)
PLATELET # BLD: 373 K/UL (ref 140–450)
PMV BLD AUTO: 6.5 FL (ref 6–12)
POTASSIUM SERPL-SCNC: 3.8 MMOL/L (ref 3.7–5.3)
RBC # BLD: 3.64 M/UL (ref 4.5–5.9)
SEG NEUTROPHILS: 85 % (ref 36–66)
SEGMENTED NEUTROPHILS ABSOLUTE COUNT: 4.25 K/UL (ref 1.8–7.7)
SODIUM BLD-SCNC: 137 MMOL/L (ref 135–144)
TOTAL PROTEIN: 7 G/DL (ref 6.4–8.3)
WBC # BLD: 5 K/UL (ref 3.5–11)

## 2022-09-20 PROCEDURE — 36591 DRAW BLOOD OFF VENOUS DEVICE: CPT

## 2022-09-20 PROCEDURE — 96413 CHEMO IV INFUSION 1 HR: CPT

## 2022-09-20 PROCEDURE — 80053 COMPREHEN METABOLIC PANEL: CPT

## 2022-09-20 PROCEDURE — G8417 CALC BMI ABV UP PARAM F/U: HCPCS | Performed by: INTERNAL MEDICINE

## 2022-09-20 PROCEDURE — 3017F COLORECTAL CA SCREEN DOC REV: CPT | Performed by: INTERNAL MEDICINE

## 2022-09-20 PROCEDURE — 1036F TOBACCO NON-USER: CPT | Performed by: INTERNAL MEDICINE

## 2022-09-20 PROCEDURE — 99215 OFFICE O/P EST HI 40 MIN: CPT | Performed by: INTERNAL MEDICINE

## 2022-09-20 PROCEDURE — 96409 CHEMO IV PUSH SNGL DRUG: CPT

## 2022-09-20 PROCEDURE — 2580000003 HC RX 258: Performed by: INTERNAL MEDICINE

## 2022-09-20 PROCEDURE — 6360000002 HC RX W HCPCS: Performed by: INTERNAL MEDICINE

## 2022-09-20 PROCEDURE — 96372 THER/PROPH/DIAG INJ SC/IM: CPT

## 2022-09-20 PROCEDURE — 96411 CHEMO IV PUSH ADDL DRUG: CPT

## 2022-09-20 PROCEDURE — G8427 DOCREV CUR MEDS BY ELIG CLIN: HCPCS | Performed by: INTERNAL MEDICINE

## 2022-09-20 PROCEDURE — 1123F ACP DISCUSS/DSCN MKR DOCD: CPT | Performed by: INTERNAL MEDICINE

## 2022-09-20 PROCEDURE — 85025 COMPLETE CBC W/AUTO DIFF WBC: CPT

## 2022-09-20 PROCEDURE — 99211 OFF/OP EST MAY X REQ PHY/QHP: CPT | Performed by: INTERNAL MEDICINE

## 2022-09-20 PROCEDURE — 96367 TX/PROPH/DG ADDL SEQ IV INF: CPT

## 2022-09-20 PROCEDURE — 96375 TX/PRO/DX INJ NEW DRUG ADDON: CPT

## 2022-09-20 PROCEDURE — 96417 CHEMO IV INFUS EACH ADDL SEQ: CPT

## 2022-09-20 RX ORDER — SODIUM CHLORIDE 9 MG/ML
5-40 INJECTION INTRAVENOUS PRN
Status: DISCONTINUED | OUTPATIENT
Start: 2022-09-20 | End: 2022-09-21 | Stop reason: HOSPADM

## 2022-09-20 RX ORDER — SODIUM CHLORIDE 0.9 % (FLUSH) 0.9 %
5-40 SYRINGE (ML) INJECTION PRN
Status: DISCONTINUED | OUTPATIENT
Start: 2022-09-20 | End: 2022-09-21 | Stop reason: HOSPADM

## 2022-09-20 RX ORDER — CYANOCOBALAMIN 1000 UG/ML
1000 INJECTION INTRAMUSCULAR; SUBCUTANEOUS ONCE
Status: COMPLETED | OUTPATIENT
Start: 2022-09-20 | End: 2022-09-20

## 2022-09-20 RX ORDER — DEXAMETHASONE SODIUM PHOSPHATE 10 MG/ML
10 INJECTION INTRAMUSCULAR; INTRAVENOUS ONCE
Status: COMPLETED | OUTPATIENT
Start: 2022-09-20 | End: 2022-09-20

## 2022-09-20 RX ORDER — SODIUM CHLORIDE 9 MG/ML
5-250 INJECTION, SOLUTION INTRAVENOUS PRN
Status: DISCONTINUED | OUTPATIENT
Start: 2022-09-20 | End: 2022-09-21 | Stop reason: HOSPADM

## 2022-09-20 RX ORDER — HEPARIN SODIUM (PORCINE) LOCK FLUSH IV SOLN 100 UNIT/ML 100 UNIT/ML
500 SOLUTION INTRAVENOUS PRN
Status: DISCONTINUED | OUTPATIENT
Start: 2022-09-20 | End: 2022-09-21 | Stop reason: HOSPADM

## 2022-09-20 RX ORDER — PALONOSETRON 0.05 MG/ML
0.25 INJECTION, SOLUTION INTRAVENOUS ONCE
Status: COMPLETED | OUTPATIENT
Start: 2022-09-20 | End: 2022-09-20

## 2022-09-20 RX ADMIN — FOSAPREPITANT 150 MG: 150 INJECTION, POWDER, LYOPHILIZED, FOR SOLUTION INTRAVENOUS at 10:05

## 2022-09-20 RX ADMIN — CARBOPLATIN 500 MG: 10 INJECTION INTRAVENOUS at 11:06

## 2022-09-20 RX ADMIN — SODIUM CHLORIDE, PRESERVATIVE FREE 10 ML: 5 INJECTION INTRAVENOUS at 08:48

## 2022-09-20 RX ADMIN — HEPARIN 500 UNITS: 100 SYRINGE at 12:17

## 2022-09-20 RX ADMIN — SODIUM CHLORIDE, PRESERVATIVE FREE 10 ML: 5 INJECTION INTRAVENOUS at 08:52

## 2022-09-20 RX ADMIN — DEXAMETHASONE SODIUM PHOSPHATE 10 MG: 10 INJECTION INTRAMUSCULAR; INTRAVENOUS at 09:45

## 2022-09-20 RX ADMIN — PEMETREXED DISODIUM 1000 MG: 500 INJECTION, POWDER, LYOPHILIZED, FOR SOLUTION INTRAVENOUS at 10:47

## 2022-09-20 RX ADMIN — SODIUM CHLORIDE, PRESERVATIVE FREE 10 ML: 5 INJECTION INTRAVENOUS at 09:37

## 2022-09-20 RX ADMIN — SODIUM CHLORIDE, PRESERVATIVE FREE 10 ML: 5 INJECTION INTRAVENOUS at 08:50

## 2022-09-20 RX ADMIN — SODIUM CHLORIDE, PRESERVATIVE FREE 10 ML: 5 INJECTION INTRAVENOUS at 08:45

## 2022-09-20 RX ADMIN — SODIUM CHLORIDE 150 ML/HR: 9 INJECTION, SOLUTION INTRAVENOUS at 09:45

## 2022-09-20 RX ADMIN — CYANOCOBALAMIN 1000 MCG: 1000 INJECTION, SOLUTION INTRAMUSCULAR at 09:48

## 2022-09-20 RX ADMIN — PALONOSETRON HYDROCHLORIDE 0.25 MG: 0.25 INJECTION, SOLUTION INTRAVENOUS at 09:45

## 2022-09-20 RX ADMIN — SODIUM CHLORIDE, PRESERVATIVE FREE 10 ML: 5 INJECTION INTRAVENOUS at 12:17

## 2022-09-20 RX ADMIN — SODIUM CHLORIDE, PRESERVATIVE FREE 10 ML: 5 INJECTION INTRAVENOUS at 08:55

## 2022-09-20 NOTE — PROGRESS NOTES
Pt here for C.4D. 1. alimta, carbo, b12  Arrives ambulatory. Denies any new complaints. Labs drawn from port, results reviewed. Pt was seen by Dr. Corin Rivera, order rec'd to proceed with tx. Tx complete without incident. Pt d/c'd in stable condition. Returns 11/1/2022 for port access for CT.

## 2022-09-20 NOTE — PROGRESS NOTES
Patient ID: Link Nieto, 1955, [id number], 79 y.o. Referred by : Manjinder Banegas MD  Diagnosis:   Oropharyngeal carcin shirley, left tonsil squamous cell carcinoma, p16 positive, clinical stage T2 a N0 M0, stage I diseasePatient has been evaluated by ENT surgical oncology at [facility] and as per the tumor board recommendation definitive radiation therapy was recommended   Patient started on definitive radiation therapy on 11/9/2021   He  completed RT on December 29, 2021     Cancer Staging  Oropharyngeal cancer [facility])  Staging form: Pharynx - HPV-Mediated Oropharynx, AJCC 8th Edition  - Clinical stage from 10/12/2021: Stage I (cT2, cN0, cM0) - Signed by Belén Dominguez MD on 10/12/2021    Primary malignant neoplasm of left lower lobe of lung ([facility] [address]  Staging form: Lung, AJCC 8th Edition  - Clinical stage from 4/6/2022: Stage IA2 (cT1b, cN0, cM0) - Signed by Piero Duke MD on 4/13/2022    3/28/2022 had a PET scan which showed resolution of uptake in the tonsil and no active lymph node in the cervical region however 1.9 cm left lower lobe nodule noted with FDG activity. Patient had CT-guided biopsy on 4/6/2022 which showed adenocarcinoma  Patient underwent lower lobectomy on 5/23/2022. Final surgical pathology showed T2 a N0 M0, with invasion of the visceral pleura with extension to the pleural surface  7/19/22  started on adjuvant chemo with Amy Alimta  Cycle #4  given on 9/20/22    HISTORY OF PRESENT ILLNESS:    Oncologic History:  Link Nieto is 79 y.o. male was seen during initial consultation with for newly diagnosed left tonsillar/oropharyngeal carcinoma. Patient initially presented with sore throat/throat pain in May of this year and was referred to ENT for further evaluation. His ENT evaluation did show 3 cm left tonsillar mass limited to tonsils only. Patient had a CT of the neck on 9/22/2021 which showed 3.2 cm mass in the left palatine tonsil without any abnormal lymphadenopathy. Patient was evaluated by ENT and had a biopsy of the left tonsil which showed poorly differentiated invasive squamous cell carcinoma, p16 positive. Subsequently patient had a PET scan on 10/6/2021 which showed no distant metastasis and no lymph node metastasis noted. Patient has been evaluated by radiation oncology  He denies any unintentional weight loss, drenching night sweats fever chills. INTERVAL HISTORY  Patient is returning for follow-up visit and to discuss lab results and further recommendations. He is tolerating chemotherapy well. He is eating and drinking well. He denies any fever chills. He had some fatigue at times. But he is active physically and denies any abdominal pain nausea vomiting. During this visit patient's allergy, social, medical, surgical history and medications were reviewed and updated. Past Medical History:   Diagnosis Date    COVID-19     home test, mild URI sx's, no hospitalization    Health care maintenance     PCP- Lillian Subramanian-  seen  6/2021    History of atrial fibrillation     follows with [facility], on eliquis    Hyperlipidemia     Hypertension     Lung cancer ([address]     Tonsillar cancer ([address] 09/2021    Tonsilar cancer. ENT -Dr Adele Campbell ,       Past Surgical History:   Procedure Laterality Date    ACHILLES TENDON SURGERY      about 30 years ago     COLONOSCOPY      CT NEEDLE BIOPSY LUNG PERCUTANEOUS  4/6/2022    CT NEEDLE BIOPSY LUNG PERCUTANEOUS 4/6/2022 [facility] CT SCAN    IR PORT PLACEMENT EQUAL OR GREATER THAN 5 YEARS  7/12/2022    IR PORT PLACEMENT EQUAL OR GREATER THAN 5 YEARS 7/12/2022 Marcy Cee MD [facility] SPECIAL PROCEDURES    LOBECTOMY Left 05/23/2022    robotic video assisted thoracoscoopy, lower lobectomy with lymphnode dissection    LUNG REMOVAL, TOTAL Left 5/23/2022    XI ROBOTIC VIDEO ASSISTED THORACOSCOPY, LOWER LOBECTOMY performed by Mark Benjamin MD at [place] [address] Left 05/23/2022       Allergies   Allergen Reactions    Seasonal Other (See Comments)     Runny nose scratchy throat       Current Outpatient Medications   Medication Sig Dispense Refill    apixaban (ELIQUIS) 5 MG TABS tablet Take 1 tablet by mouth 2 times daily 120 tablet 4    flecainide (TAMBOCOR) 50 MG tablet       folic acid (FOLVITE) 1 MG tablet Take 1 tablet by mouth in the morning. Start 7 days prior to chemotherapy. 90 tablet 3    lidocaine-prilocaine (EMLA) 2.5-2.5 % cream Apply topically 45-60 mins prior to needle poke daily PRN 1 each 2    ondansetron (ZOFRAN-ODT) 8 MG TBDP disintegrating tablet Place 1 tablet under the tongue 3 times daily as needed for Nausea or Vomiting (Patient not taking: No sig reported) 90 tablet 2    prochlorperazine (COMPAZINE) 10 MG tablet Take 1 tablet by mouth every 6 hours as needed (nausea vomiting) (Patient not taking: No sig reported) 120 tablet 2    metoprolol tartrate (LOPRESSOR) 25 MG tablet Take 1 tablet by mouth 2 times daily 60 tablet 3    lisinopril (PRINIVIL;ZESTRIL) 10 MG tablet Take 1 tablet by mouth daily 30 tablet 3    famotidine (PEPCID) 20 MG tablet Take 1 tablet by mouth 2 times daily 60 tablet 3    lidocaine 4 % external patch Place 1 patch onto the skin daily (Patient not taking: No sig reported) 15 patch 0    simvastatin (ZOCOR) 20 MG tablet TAKE 1 TABLET BY MOUTH AT  NIGHT 90 tablet 3    hydroCHLOROthiazide (HYDRODIURIL) 12.5 MG tablet TAKE 1 TABLET BY MOUTH  DAILY 60 tablet 5    omeprazole (PRILOSEC) 20 MG delayed release capsule Take 20 mg by mouth daily Pt takes 10mg daily      Multiple Vitamins-Minerals (THERAPEUTIC MULTIVITAMIN-MINERALS) tablet Take 1 tablet by mouth daily       No current facility-administered medications for this visit.        Social History     Socioeconomic History    Marital status:      Spouse name: Not on file    Number of children: Not on file    Years of education: Not on file    Highest education level: Not on file   Occupational History    Not on file   Tobacco Use    Smoking status: Former     Types: Cigarettes     Quit date: 3/30/2022     Years since quittin.4    Smokeless tobacco: Never    Tobacco comments:     states quit smoking \"40 yrs ago\"   Vaping Use    Vaping Use: Never used   Substance and Sexual Activity    Alcohol use: Yes     Comment: socially     Drug use: Not Currently    Sexual activity: Yes     Partners: Female   Other Topics Concern    Not on file   Social History Narrative    Not on file     Social Determinants of Health     Financial Resource Strain: Not on file   Food Insecurity: Not on file   Transportation Needs: Not on file   Physical Activity: Not on file   Stress: Not on file   Social Connections: Not on file   Intimate Partner Violence: Not on file   Housing Stability: Not on file       Family History   Problem Relation Age of Onset    Coronary Art Dis Mother     Cancer Father         REVIEW OF SYSTEM:     Constitutional: No fever or chills. No night sweats, no weight loss   Eyes: No eye discharge, double vision, or eye pain   HEENT: negative for sore mouth, sore throat, hoarseness and voice change   Respiratory: negative for cough , sputum, dyspnea, wheezing, hemoptysis, chest pain   Cardiovascular: negative for chest pain, dyspnea, palpitations, orthopnea, PND   Gastrointestinal: negative for nausea, vomiting, diarrhea, constipation, abdominal pain, Dysphagia, hematemesis and hematochezia   Genitourinary: negative for frequency, dysuria, nocturia, urinary incontinence, and hematuria   Integument: negative for rash, skin lesions, bruises.    Hematologic/Lymphatic: negative for easy bruising, bleeding, lymphadenopathy, petechiae and swelling/edema   Endocrine: negative for heat or cold intolerance, tremor, weight changes, change in bowel habits and hair loss   Musculoskeletal: negative for myalgias, arthralgias, pain, joint swelling,and bone pain   Neurological: negative for headaches, dizziness, seizures, weakness, numbness       OBJECTIVE:         There were no vitals filed for this visit.       PHYSICAL EXAM:   General appearance - well appearing, no in pain or distress   Mental status - alert and cooperative   Eyes - pupils equal and reactive, extraocular eye movements intact   Ears - bilateral TM's and external ear canals normal   Mouth - mucous membranes moist, pharynx normal without lesions   Neck - supple, no significant adenopathy   Lymphatics - no palpable lymphadenopathy, no hepatosplenomegaly   Chest - clear to auscultation, no wheezes, rales or rhonchi, symmetric air entry   Heart - normal rate, regular rhythm, normal S1, S2, no murmurs, rubs, clicks or gallops   Abdomen - soft, nontender, nondistended, no masses or organomegaly   Neurological - alert, oriented, normal speech, no focal findings or movement disorder noted   Musculoskeletal - no joint tenderness, deformity or swelling   Extremities - peripheral pulses normal, no pedal edema, no clubbing or cyanosis   Skin - normal coloration and turgor, no rashes, no suspicious skin lesions noted ,      LABORATORY DATA:     Lab Results   Component Value Date    WBC 6.7 08/30/2022    HGB 12.2 (L) 08/30/2022    HCT 34.6 (L) 08/30/2022    MCV 87.8 08/30/2022     08/30/2022    LYMPHOPCT 7 (L) 08/30/2022    RBC 3.95 (L) 08/30/2022    MCH 30.9 08/30/2022    MCHC 35.2 08/30/2022    RDW 16.2 (H) 08/30/2022    MONOPCT 6 08/30/2022    BASOPCT 0 08/30/2022    NEUTROABS 5.83 08/30/2022    LYMPHSABS 0.47 (L) 08/30/2022    MONOSABS 0.40 08/30/2022    EOSABS 0.00 08/30/2022    BASOSABS 0.00 08/30/2022         Chemistry        Component Value Date/Time     08/30/2022 0927    K 3.9 08/30/2022 0927     08/30/2022 0927    CO2 24 08/30/2022 0927    BUN 14 08/30/2022 0927    CREATININE 0.85 08/30/2022 0927        Component Value Date/Time    CALCIUM 9.8 08/30/2022 0927    ALKPHOS 72 08/30/2022 0927    AST 15 08/30/2022 0927    ALT 20 08/30/2022 0927    BILITOT 0.38 08/30/2022 0927        PATHOLOGY DATA:   10/1/2021 8:43 AM - Dangelo, lan Incoming Lab Results From [facility] [facility]    Component Collected Lab   Surgical Pathology Report 09/27/2021 10:13  [address]   -- Diagnosis --   LEFT TONSIL, BIOPSIES:             -  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 Surgical Pathology Report  SURGICAL PATHOLOGY REPORT  Collected: 04/06/22 1230   Lab status: Final   Resulting lab: [facility]   Value: -- Diagnosis --   LUNG, LEFT LOWER LOBE, CT-GUIDED CORE NEEDLE BIOPSY:        -  ADENOCARCINOMA, CONSISTENT WITH LUNG PRIMARY. Surgical Pathology Report  SURGICAL PATHOLOGY REPORT  Collected: 05/23/22 1235   Lab status: Final   Resulting lab: [facility]   Value: -- Diagnosis --   LUNG, LEFT LOWER LOBE, LOBECTOMY:   -LUNG INVASIVE ACINAR ADENOCARCINOMA, POORLY DIFFERENTIATED, SIZE 2.8   CM, WITH INVASION OF THE VISCERAL PLEURA WITH EXTENSION TO THE PLEURAL   SURFACE. -MARGINS NEGATIVE.     -FIVE (5) LYMPH NODES NEGATIVE FOR CARCINOMA. DISTANT SITE(S) INVOLVED, IF APPLICABLE: N/A. PATHOLOGIC STAGE CLASSIFICATION:     pT2a  pN0     IMAGING DATA:    PET CT SKULL BASE TO MID THIGH  Narrative: EXAMINATION:  WHOLE BODY PET/CT  10/6/2021    TECHNIQUE:  Following IV injection of 11.4 mCi of F 18 -FDG, PET  tumor imaging was  acquired from the base of the skull to the mid thighs. Computed tomography  was used for purposes of attenuation correction and anatomic localization. Fusion imaging was utilized for interpretation. Uptake time 59 mins. Glucose level 105 mg/dl. COMPARISON:  CT neck 09/22/2021    HISTORY:  Reason for Exam: Malignant neoplasm of tonsillar fossa  Acuity: Acute  Type of Exam: Initial    FINDINGS:  HEAD/NECK: Redemonstration of left palatine tonsillar lesion which is FDG  avid with max SUV of 12.0. No metabolically active cervical lymphadenopathy.   8 mm posterior right thyroid hypodense nodule which is FDG avid with max SUV  of 6.0. CHEST: Left retrocardiac lower lobe nodular area of opacification measuring 2  cm which demonstrates faint uptake with max SUV of 2.3. No metabolically  active axillary, hilar, or mediastinal lymphadenopathy. ABDOMEN/PELVIS: No metabolically active intraperitoneal mass. No  metabolically active abdominal or pelvic lymphadenopathy. Physiologic  activity in the gastrointestinal and genitourinary systems. BONES/SOFT TISSUE: No abnormal FDG activity localizes to the bones. No  aggressive osseous lesion. INCIDENTAL CT FINDINGS: Multivessel coronary artery calcification. Aortic  atherosclerosis most pronounced in the infrarenal segment. Sigmoid  diverticulosis. Impression: 1. Redemonstration of left palatine tonsillar lesion which is FDG avid  consistent with history of malignancy. 2. Posterior right thyroid hypodense nodule which is FDG avid, recommend  further evaluation with dedicated ultrasound. 3. Left retrocardiac lower lobe nodular area of opacification as measured  above which demonstrates faint uptake and is favored to represent possible  pneumonia or other infectious/inflammatory process though  metastatic/neoplastic etiology not definitively excluded. Recommend post  treatment chest CT in 6-8 weeks to reassess. If persistent at time of  follow-up then further evaluation can be obtained with tissue sampling. PET 3/38/22  NIRADS score for Neck Primary: 1: No evidence of recurrence: routine   surveillance, CECT       NIRADS score for Neck Nodes: 1: No evidence of recurrence: routine   surveillance. There continues to be focal activity in a small nodule in the right thyroid   lobe. Left lower lobe pulmonary nodule with increasing FDG avidity. This could   potentially represent a primary lung cancer or metastatic disease. Benign   etiologies remain a consideration       ASSESSMENT:    Charo Day is a 79 y.o. pleasant male with oropharyngeal cancer and now with left lower lobe adenocarcinoma    Oropharyngeal cancer: Diagnosed oropharyngeal cancer, p16 positive, clinical stage T2 a N0 M0, stage I disease. Patient completed definitive radiation therapy and now was on surveillance   I reviewed the NCCN guidelines and goals of care with patient. Left lower lobe lung adenocarcinoma. Patient underwent left lower lobectomy on 5/23/2022 and final pathology showed T2AN0 disease with visceral pleural invasion and now currently on adjuvant systemic chemotherapy and today will receive cycle #4    Thyroid nodule: We will follow with ultrasound of the thyroid  During today's visit, the patient and the family had a number of reasonable questions which were answered to their satisfaction. They verbalized understanding of the information provided and they agreed to proceed as outlined above. PLAN:   I reviewed his lab work, discussed diagnosis and treatment recommendations   Today will be his last cycle of chemotherapy   I will get CT chest abdomen pelvis, CT of the neck and ultrasound of the thyroid in 6 to 8 weeks   If his cancer is stable we will plan to remove the port   Return to clinic in 8 weeks with labs prior and scans as mentioned           Charles Pryor MD  Hematologist/Medical Oncologist    On this date 9/20/22  I have spent 40 minutes reviewing previous notes, test results and face to face with the patient discussing the diagnosis and importance of compliance with the treatment plan. Greater than 50% of that time was spent face-to-face with the patient in counseling and coordinating her care. This note is created with the assistance of a speech recognition program.  While intending to generate a document that actually reflects the content of the visit, the document can still have some errors including those of syntax and sound a like substitutions which may escape proof reading.   It such instances, actual

## 2022-09-20 NOTE — PATIENT INSTRUCTIONS
Chemo as planned  CT chest and US thyroid in 6-7 weeks (can cancel scan in December)  RTC after scans

## 2022-09-21 ENCOUNTER — TELEPHONE (OUTPATIENT)
Dept: ONCOLOGY | Age: 67
End: 2022-09-21

## 2022-09-21 RX ORDER — FAMOTIDINE 20 MG/1
TABLET, FILM COATED ORAL
Qty: 60 TABLET | Refills: 3 | OUTPATIENT
Start: 2022-09-21

## 2022-09-21 NOTE — TELEPHONE ENCOUNTER
AVS from 9/20/22      Chemo as planned  CT chest and US thyroid in 6-7 weeks (can cancel scan in December)  RTC after scans      Scans scheduled for 11/1 @ 9:00 am    Rv scheduled for 11/7 @ 9:45 am     Pt was given AVS and appointment schedule    Electronically signed by Vickii Scheuermann on 9/21/2022 at 8:42 AM

## 2022-09-22 ENCOUNTER — TELEPHONE (OUTPATIENT)
Dept: ONCOLOGY | Age: 67
End: 2022-09-22

## 2022-09-22 NOTE — TELEPHONE ENCOUNTER
Name: Clint Mcguire  : 1955  MRN: [id number]    Oncology Navigation Follow-Up Note    Contact Type:  Telephone    Notes: Spoke with pt for f/u call. Pt denied questions/concerns. Pt confirmed 2022 imaging & Dr. Kg Brody appts. Inquired on Dr. Tesfaye Buenrostro f/u. Pt stated scheduled 10/2022. Instructed pt may contact writer prn. Will continue to follow.     Electronically signed by Eliseo Leo RN on 2022 at 11:36 AM

## 2022-10-30 NOTE — PROGRESS NOTES
Subjective:     Patient ID: Rzaa Ling is a 79 y.o. male    HPI  Very interesting new patient to me previously seen by Dr. Dolores Ramirez. About a year ago was diagnosed with oropharyngeal cancer had surgery and radiation therapy and has done very well. About 6 months ago he was found to have lung cancer and left lower lobe at a very early stage which was adenocarcinoma. Has been treated by chemotherapy for that. Overall he is done very well through all of this. Summary from oncology as follows:  Oropharyngeal carcin shirley, left tonsil squamous cell carcinoma, p16 positive, clinical stage T2 a N0 M0, stage I diseasePatient has been evaluated by ENT surgical oncology at [facility] and as per the tumor board recommendation definitive radiation therapy was recommended   Patient started on definitive radiation therapy on 11/9/2021   He  completed RT on December 29, 2021    Primary malignant neoplasm of left lower lobe of lung [facility])  Staging form: Lung, AJCC 8th Edition  - Clinical stage from 4/6/2022: Stage IA2 (cT1b, cN0, cM0) - Signed by Ayesha Mcdonald MD on 4/13/2022   adenocarcinoma        Is also been seen for intermittent A. fib taking flecainide but he will stop at the end of November. He is anticoagulated. And follows with cardiology      Not regular smoker    Former runner   but stopped    walks on regular basis     Due for colonoscopy         retired last October  but did work at [employer] when in college    Fhx  neg for cancer DM but mother had heart disease    Father 80       Other diagnoses include hypertension anticoagulation hyperlipidemia and elevated blood sugar. This chart documentation is reviewed in detail including his labs and images. Review of Systems   Constitutional:  Negative for activity change, appetite change, diaphoresis, fatigue, fever and unexpected weight change. HENT:  Negative for sore throat. Eyes:  Negative for visual disturbance.    Respiratory: Negative for cough, chest tightness, shortness of breath and wheezing. Cardiovascular:  Negative for chest pain, palpitations and leg swelling. Gastrointestinal:  Negative for constipation and diarrhea. Genitourinary:  Negative for dysuria and urgency. Musculoskeletal:  Negative for back pain. Neurological:  Negative for dizziness, syncope and headaches. Hematological:  Does not bruise/bleed easily. Psychiatric/Behavioral:  Negative for confusion and sleep disturbance. The patient is not nervous/anxious. Objective:     Physical Exam  Constitutional:       Appearance: Normal appearance. HENT:      Head: Normocephalic. Right Ear: External ear normal.      Left Ear: External ear normal.      Nose: Nose normal.      Mouth/Throat:      Mouth: Mucous membranes are moist.      Pharynx: Oropharynx is clear. Eyes:      Conjunctiva/sclera: Conjunctivae normal.   Cardiovascular:      Rate and Rhythm: Normal rate and regular rhythm. Pulses: Normal pulses. Heart sounds: Normal heart sounds. No murmur heard. Pulmonary:      Effort: Pulmonary effort is normal.      Breath sounds: Normal breath sounds. Musculoskeletal:         General: Normal range of motion. Cervical back: Neck supple. Right lower leg: No edema. Left lower leg: No edema. Lymphadenopathy:      Cervical: No cervical adenopathy. Skin:     General: Skin is warm and dry. Capillary Refill: Capillary refill takes less than 2 seconds. Neurological:      General: No focal deficit present. Mental Status: He is alert and oriented to person, place, and time. Psychiatric:         Mood and Affect: Mood normal.         Behavior: Behavior normal.       Assessment/Plan:     1. Essential hypertension    2. Hyperlipidemia, mixed    3. History of oropharyngeal cancer    4. Atrial fibrillation, unspecified type ([address]    5. Anticoagulated    6.  Primary malignant neoplasm of left lower lobe of lung ([address] Guilherme Mckeon was seen today for establish care, hypertension and other. Diagnoses and all orders for this visit:    Essential hypertension  -     hydroCHLOROthiazide (HYDRODIURIL) 12.5 MG tablet; Take 1 tablet by mouth daily  Continue metoprolol 25 mg twice daily lisinopril 10 mg once daily avoid added salt and excess caffeine  Hyperlipidemia, mixed  -     simvastatin (ZOCOR) 20 MG tablet; TAKE 1 TABLET BY MOUTH AT  NIGHT    History of oropharyngeal cancer  Follows with oncology  Atrial fibrillation, unspecified type [facility])  Follows with cardiology due to stop flecainide the end of November  Anticoagulated  On apixaban which more than likely would be lifelong  Primary malignant neoplasm of left lower lobe of lung ([facility] [address]  Follows with oncology    We discussed the anti-inflammatory lifestyle and handouts were given for the simple 7 anti-inflammatory lifestyle and low glycemic index    Will follow his chronic care conditions and schedule an annual wellness visit    Time spent reviewing chart labs prescription management interviewing and examining the patient and documenting the record more than 40 minutes     Ailyn Becerra MD    Please note that this chart was generated using voice recognition Dragon dictation software. Although every effort was made to ensure the accuracy of this automated transcription, some errors in transcription may have occurred.

## 2022-10-31 ENCOUNTER — OFFICE VISIT (OUTPATIENT)
Dept: PRIMARY CARE CLINIC | Age: 67
End: 2022-10-31
Payer: MEDICARE

## 2022-10-31 VITALS
HEART RATE: 55 BPM | SYSTOLIC BLOOD PRESSURE: 134 MMHG | OXYGEN SATURATION: 97 % | BODY MASS INDEX: 26.07 KG/M2 | WEIGHT: 172 LBS | HEIGHT: 68 IN | DIASTOLIC BLOOD PRESSURE: 86 MMHG

## 2022-10-31 DIAGNOSIS — Z79.01 ANTICOAGULATED: ICD-10-CM

## 2022-10-31 DIAGNOSIS — I10 ESSENTIAL HYPERTENSION: Primary | ICD-10-CM

## 2022-10-31 DIAGNOSIS — C34.32 PRIMARY MALIGNANT NEOPLASM OF LEFT LOWER LOBE OF LUNG (HCC): ICD-10-CM

## 2022-10-31 DIAGNOSIS — I48.91 ATRIAL FIBRILLATION, UNSPECIFIED TYPE (HCC): ICD-10-CM

## 2022-10-31 DIAGNOSIS — E78.2 HYPERLIPIDEMIA, MIXED: ICD-10-CM

## 2022-10-31 DIAGNOSIS — Z85.819 HISTORY OF OROPHARYNGEAL CANCER: ICD-10-CM

## 2022-10-31 PROCEDURE — 3074F SYST BP LT 130 MM HG: CPT | Performed by: FAMILY MEDICINE

## 2022-10-31 PROCEDURE — 3078F DIAST BP <80 MM HG: CPT | Performed by: FAMILY MEDICINE

## 2022-10-31 PROCEDURE — 99215 OFFICE O/P EST HI 40 MIN: CPT | Performed by: FAMILY MEDICINE

## 2022-10-31 PROCEDURE — 1123F ACP DISCUSS/DSCN MKR DOCD: CPT | Performed by: FAMILY MEDICINE

## 2022-10-31 SDOH — ECONOMIC STABILITY: FOOD INSECURITY: WITHIN THE PAST 12 MONTHS, THE FOOD YOU BOUGHT JUST DIDN'T LAST AND YOU DIDN'T HAVE MONEY TO GET MORE.: NEVER TRUE

## 2022-10-31 SDOH — ECONOMIC STABILITY: FOOD INSECURITY: WITHIN THE PAST 12 MONTHS, YOU WORRIED THAT YOUR FOOD WOULD RUN OUT BEFORE YOU GOT MONEY TO BUY MORE.: NEVER TRUE

## 2022-10-31 ASSESSMENT — PATIENT HEALTH QUESTIONNAIRE - PHQ9
2. FEELING DOWN, DEPRESSED OR HOPELESS: 0
SUM OF ALL RESPONSES TO PHQ QUESTIONS 1-9: 0
SUM OF ALL RESPONSES TO PHQ9 QUESTIONS 1 & 2: 0
SUM OF ALL RESPONSES TO PHQ QUESTIONS 1-9: 0
1. LITTLE INTEREST OR PLEASURE IN DOING THINGS: 0
SUM OF ALL RESPONSES TO PHQ QUESTIONS 1-9: 0
SUM OF ALL RESPONSES TO PHQ QUESTIONS 1-9: 0

## 2022-10-31 ASSESSMENT — SOCIAL DETERMINANTS OF HEALTH (SDOH): HOW HARD IS IT FOR YOU TO PAY FOR THE VERY BASICS LIKE FOOD, HOUSING, MEDICAL CARE, AND HEATING?: NOT VERY HARD

## 2022-11-01 ENCOUNTER — HOSPITAL ENCOUNTER (OUTPATIENT)
Dept: INFUSION THERAPY | Age: 67
Discharge: HOME | End: 2022-11-01
Payer: MEDICARE

## 2022-11-01 ENCOUNTER — HOSPITAL ENCOUNTER (OUTPATIENT)
Dept: ULTRASOUND IMAGING | Age: 67
Discharge: HOME | End: 2022-11-03
Payer: MEDICARE

## 2022-11-01 ENCOUNTER — HOSPITAL ENCOUNTER (OUTPATIENT)
Dept: CT IMAGING | Age: 67
Discharge: HOME | End: 2022-11-03
Payer: MEDICARE

## 2022-11-01 DIAGNOSIS — C34.32 PRIMARY MALIGNANT NEOPLASM OF LEFT LOWER LOBE OF LUNG (HCC): ICD-10-CM

## 2022-11-01 DIAGNOSIS — C34.32 PRIMARY MALIGNANT NEOPLASM OF LEFT LOWER LOBE OF LUNG (HCC): Primary | ICD-10-CM

## 2022-11-01 DIAGNOSIS — C10.9 OROPHARYNGEAL CANCER (HCC): ICD-10-CM

## 2022-11-01 LAB
ABSOLUTE EOS #: 0.1 K/UL (ref 0–0.4)
ABSOLUTE LYMPH #: 0.8 K/UL (ref 1–4.8)
ABSOLUTE MONO #: 0.5 K/UL (ref 0.1–1.2)
ALBUMIN SERPL-MCNC: 4.5 G/DL (ref 3.5–5.2)
ALBUMIN/GLOBULIN RATIO: 2 (ref 1–2.5)
ALP BLD-CCNC: 74 U/L (ref 40–129)
ALT SERPL-CCNC: 15 U/L (ref 5–41)
ANION GAP SERPL CALCULATED.3IONS-SCNC: 10 MMOL/L (ref 9–17)
AST SERPL-CCNC: 19 U/L
BASOPHILS # BLD: 1 % (ref 0–2)
BASOPHILS ABSOLUTE: 0 K/UL (ref 0–0.2)
BILIRUB SERPL-MCNC: 0.7 MG/DL (ref 0.3–1.2)
BUN BLDV-MCNC: 12 MG/DL (ref 8–23)
CALCIUM SERPL-MCNC: 9.4 MG/DL (ref 8.6–10.4)
CHLORIDE BLD-SCNC: 104 MMOL/L (ref 98–107)
CO2: 26 MMOL/L (ref 20–31)
CREAT SERPL-MCNC: 0.89 MG/DL (ref 0.7–1.2)
EOSINOPHILS RELATIVE PERCENT: 2 % (ref 1–4)
GFR SERPL CREATININE-BSD FRML MDRD: >60 ML/MIN/1.73M2
GLUCOSE BLD-MCNC: 110 MG/DL (ref 70–99)
HCT VFR BLD CALC: 35.9 % (ref 41–53)
HEMOGLOBIN: 12.5 G/DL (ref 13.5–17.5)
LYMPHOCYTES # BLD: 22 % (ref 24–44)
MCH RBC QN AUTO: 32.2 PG (ref 26–34)
MCHC RBC AUTO-ENTMCNC: 34.7 G/DL (ref 31–37)
MCV RBC AUTO: 92.9 FL (ref 80–100)
MONOCYTES # BLD: 12 % (ref 2–11)
PDW BLD-RTO: 18 % (ref 12.5–15.4)
PLATELET # BLD: 264 K/UL (ref 140–450)
PMV BLD AUTO: 7.2 FL (ref 6–12)
POTASSIUM SERPL-SCNC: 4.1 MMOL/L (ref 3.7–5.3)
RBC # BLD: 3.87 M/UL (ref 4.5–5.9)
SEG NEUTROPHILS: 63 % (ref 36–66)
SEGMENTED NEUTROPHILS ABSOLUTE COUNT: 2.4 K/UL (ref 1.8–7.7)
SODIUM BLD-SCNC: 140 MMOL/L (ref 135–144)
TOTAL PROTEIN: 6.7 G/DL (ref 6.4–8.3)
WBC # BLD: 3.8 K/UL (ref 3.5–11)

## 2022-11-01 PROCEDURE — 76536 US EXAM OF HEAD AND NECK: CPT

## 2022-11-01 PROCEDURE — 2580000003 HC RX 258: Performed by: INTERNAL MEDICINE

## 2022-11-01 PROCEDURE — 6360000002 HC RX W HCPCS: Performed by: INTERNAL MEDICINE

## 2022-11-01 PROCEDURE — 80053 COMPREHEN METABOLIC PANEL: CPT

## 2022-11-01 PROCEDURE — 85025 COMPLETE CBC W/AUTO DIFF WBC: CPT

## 2022-11-01 PROCEDURE — A4641 RADIOPHARM DX AGENT NOC: HCPCS | Performed by: INTERNAL MEDICINE

## 2022-11-01 PROCEDURE — 74177 CT ABD & PELVIS W/CONTRAST: CPT

## 2022-11-01 PROCEDURE — 36591 DRAW BLOOD OFF VENOUS DEVICE: CPT

## 2022-11-01 PROCEDURE — 70491 CT SOFT TISSUE NECK W/DYE: CPT

## 2022-11-01 PROCEDURE — 6360000004 HC RX CONTRAST MEDICATION: Performed by: INTERNAL MEDICINE

## 2022-11-01 RX ORDER — SODIUM CHLORIDE 0.9 % (FLUSH) 0.9 %
5-40 SYRINGE (ML) INJECTION PRN
Status: DISCONTINUED | OUTPATIENT
Start: 2022-11-01 | End: 2022-11-02 | Stop reason: HOSPADM

## 2022-11-01 RX ORDER — HEPARIN SODIUM (PORCINE) LOCK FLUSH IV SOLN 100 UNIT/ML 100 UNIT/ML
500 SOLUTION INTRAVENOUS PRN
OUTPATIENT
Start: 2022-11-01

## 2022-11-01 RX ORDER — 0.9 % SODIUM CHLORIDE 0.9 %
80 INTRAVENOUS SOLUTION INTRAVENOUS ONCE
Status: COMPLETED | OUTPATIENT
Start: 2022-11-01 | End: 2022-11-01

## 2022-11-01 RX ORDER — SODIUM CHLORIDE 0.9 % (FLUSH) 0.9 %
5-40 SYRINGE (ML) INJECTION PRN
OUTPATIENT
Start: 2022-11-01

## 2022-11-01 RX ORDER — SODIUM CHLORIDE 0.9 % (FLUSH) 0.9 %
10 SYRINGE (ML) INJECTION PRN
Status: DISCONTINUED | OUTPATIENT
Start: 2022-11-01 | End: 2022-11-04 | Stop reason: HOSPADM

## 2022-11-01 RX ORDER — HEPARIN SODIUM (PORCINE) LOCK FLUSH IV SOLN 100 UNIT/ML 100 UNIT/ML
500 SOLUTION INTRAVENOUS PRN
Status: DISCONTINUED | OUTPATIENT
Start: 2022-11-01 | End: 2022-11-02 | Stop reason: HOSPADM

## 2022-11-01 RX ORDER — SODIUM CHLORIDE 9 MG/ML
25 INJECTION, SOLUTION INTRAVENOUS PRN
OUTPATIENT
Start: 2022-11-01

## 2022-11-01 RX ORDER — SODIUM CHLORIDE 9 MG/ML
25 INJECTION, SOLUTION INTRAVENOUS PRN
Status: CANCELLED | OUTPATIENT
Start: 2022-11-01

## 2022-11-01 RX ADMIN — HEPARIN 500 UNITS: 100 SYRINGE at 10:24

## 2022-11-01 RX ADMIN — SODIUM CHLORIDE, PRESERVATIVE FREE 20 ML: 5 INJECTION INTRAVENOUS at 08:33

## 2022-11-01 RX ADMIN — BARIUM SULFATE 450 ML: 20 SUSPENSION ORAL at 09:40

## 2022-11-01 RX ADMIN — SODIUM CHLORIDE 80 ML: 9 INJECTION, SOLUTION INTRAVENOUS at 09:41

## 2022-11-01 RX ADMIN — IOPAMIDOL 100 ML: 755 INJECTION, SOLUTION INTRAVENOUS at 09:41

## 2022-11-01 RX ADMIN — SODIUM CHLORIDE, PRESERVATIVE FREE 10 ML: 5 INJECTION INTRAVENOUS at 09:42

## 2022-11-01 RX ADMIN — SODIUM CHLORIDE, PRESERVATIVE FREE 10 ML: 5 INJECTION INTRAVENOUS at 10:24

## 2022-11-03 ENCOUNTER — TELEPHONE (OUTPATIENT)
Dept: INFUSION THERAPY | Age: 67
End: 2022-11-03

## 2022-11-04 ENCOUNTER — TELEPHONE (OUTPATIENT)
Dept: PHARMACY | Facility: CLINIC | Age: 67
End: 2022-11-04

## 2022-11-04 VITALS — HEIGHT: 68 IN | WEIGHT: 170 LBS | BODY MASS INDEX: 25.76 KG/M2

## 2022-11-04 RX ORDER — HYDROCHLOROTHIAZIDE 12.5 MG/1
12.5 TABLET ORAL DAILY
Qty: 90 TABLET | Refills: 1 | Status: SHIPPED | OUTPATIENT
Start: 2022-11-04 | End: 2023-02-02

## 2022-11-04 RX ORDER — SIMVASTATIN 20 MG
TABLET ORAL
Qty: 90 TABLET | Refills: 1 | Status: SHIPPED | OUTPATIENT
Start: 2022-11-04 | End: 2023-10-31

## 2022-11-04 ASSESSMENT — ENCOUNTER SYMPTOMS
SORE THROAT: 0
COUGH: 0
CONSTIPATION: 0
CHEST TIGHTNESS: 0
WHEEZING: 0
SHORTNESS OF BREATH: 0
DIARRHEA: 0
BACK PAIN: 0

## 2022-11-04 NOTE — TELEPHONE ENCOUNTER
CLINICAL PHARMACY: [facility]    2nd Attempt Documentation:   [employer] message sent.

## 2022-11-04 NOTE — PROGRESS NOTES
DAY OF SURGERY/PROCEDURE  GUIDELINES    As a patient at the [facility], you can expect quality medical and nursing care that is centered on your individual needs. It is our goal to make your surgical experience as comfortable and excellent as possible.  ________________________________________________________________________    The following instructions are general guidelines, if any information on this sheet is different from what your doctor has instructed you to do, please follow your doctor's instructions. Please arrive on 11/22 @ 745 am      Enter through entrance C. Check in at registration     Upon arrival you will be taken to the pre-operative area to get ready for surgery, your family will stay in the waiting room and visit with you once you are ready for surgery. Due to special limitations please limit visitation to 1-2 members of your family at a time. When it is time for surgery your family will return to the waiting room. Nothing to eat, drink, smoke, suck or chew after midnight (no water, gum, mints, cigarettes, cigars, pipes, snuff, chewing tobacco, etc.) or your surgery may be canceled. Take a shower or bath on the morning of your surgery/procedure (Hibiclens if directed)    Brush your teeth, but do not swallow any water    IN CASE OF ILLNESS - If you have a cold or flu symptoms (high fever, runny nose, sore throat, cough, etc.) rash, nausea, vomiting, loose stools, and/or recent contact with someone who has a contagious disease (chick pox, measles, etc.) please call your doctor before coming to the surgery center    Take a small sip of water with heart, blood pressure, and/or seizure medication the morning of surgery.  Metoprolol    If applicable bring your:  Eyeglasses and Case (If you wear contacts they have to be removed before surgery, bring case and solution)     DO NOT take anticoagulants (blood thinners, aspirin or aspirin-containing products) as instructed by your physician. Wear loose, comfortable clothing that is easy to put on and take off. They will remain in post-op with the nurse. If you will be returning home the same day as your surgery, you will need to have a responsible adult (25years of age or older) present to drive you home. You will need someone stay with you at home for the first 24 hours following your surgery. This is due to the anesthesia and the medication given to you during surgery and recovery.

## 2022-11-04 NOTE — TELEPHONE ENCOUNTER
Patient returned message regarding adherence message below. States he was on lisinopril 20mg but was decreased to 10mg. Patient has bottles from both [facility]. States still using up the 20mg and splitting. Declines any refills at this time.

## 2022-11-04 NOTE — TELEPHONE ENCOUNTER
[facility] CLINICAL PHARMACY: ADHERENCE REVIEW  Identified care gap per [employer]: fills at [address] : ACE/ARB and Statin adherence    Last Visit: 10.31.22          ASSESSMENT  ACE/ARB ADHERENCE    Insurance Records claims through 10.21.22 (Prior Year Mau Sanchezra = PASSED; YTD [place] =  80%; Potential Fail Date: 11.07.22 ):   Lisinopril last filled on 05.27.22 for 30 day supply. Next refill due: 06.26.22    Per  [place] Portal:  (same as above). BP Readings from Last 3 Encounters:   10/31/22 134/86   10/12/22 124/79   09/20/22 (!) 147/88     Estimated Creatinine Clearance: 78 mL/min (based on SCr of 0.89 mg/dL). [id number] Records claims through 10.21.22 (Prior Year Mau Jovel[place] = PASSED; YTD [place] =  100%; Passed in 2022): Simvastatin last filled on 09.22.22 for 90 day supply. Next refill due: 12.21.22    Per  [place] Portal:  (same as above). No results found for: CHOL, TRIG, HDL, LDLCHOLESTEROL, LDLCALC, LDLDIRECT  ALT   Date Value Ref Range Status   11/01/2022 15 5 - 41 U/L Final     AST   Date Value Ref Range Status   11/01/2022 19 <40 U/L Final     The ASCVD Risk score (Tre DK, et al., 2019) failed to calculate for the following reasons:    Cannot find a previous HDL lab    Cannot find a previous total cholesterol lab     PLAN  The following are interventions that have been identified:   - Patient overdue refilling Lisinopril and active on home medication list.    - this medication is eligible for 90 day supply    Attempting to reach patient to review. Left message asking for return call.          Future Appointments   Date Time Provider Avril Castro   11/8/2022  9:45 AM Viry Dow MD [facility] CANCER TOLPP   11/10/2022 11:00 AM SCHEDULE, LEONARDO DSOUZA [facility]   11/28/2022  9:30 AM Corazon Rodriguez MD [facility] None   12/13/2022  9:30 AM [facility]   1/5/2023 10:00 AM [facility]EDULE[facility]LA[facility] Cle[facility]   1/31/2023  7:00 AM MD sumanth Owens [facility]       Dorina Magana [address] [place] [address],[address] Floor Clinical Pharmacy  Phone: toll free [phone]

## 2022-11-04 NOTE — TELEPHONE ENCOUNTER
Correct Lisinopril dose is listed on chart, no further action needed at this time.      ===================================================================    For Pharmacy Admin Tracking Only    Gap Closed?: No   Time Spent (min): 15

## 2022-11-08 ENCOUNTER — OFFICE VISIT (OUTPATIENT)
Dept: ONCOLOGY | Age: 67
End: 2022-11-08
Payer: MEDICARE

## 2022-11-08 ENCOUNTER — TELEPHONE (OUTPATIENT)
Dept: INTERVENTIONAL RADIOLOGY/VASCULAR | Age: 67
End: 2022-11-08

## 2022-11-08 VITALS
BODY MASS INDEX: 26.35 KG/M2 | DIASTOLIC BLOOD PRESSURE: 73 MMHG | TEMPERATURE: 97 F | WEIGHT: 173.3 LBS | HEART RATE: 55 BPM | SYSTOLIC BLOOD PRESSURE: 128 MMHG

## 2022-11-08 DIAGNOSIS — C10.9 OROPHARYNGEAL CANCER (HCC): Primary | ICD-10-CM

## 2022-11-08 DIAGNOSIS — C34.32 PRIMARY MALIGNANT NEOPLASM OF LEFT LOWER LOBE OF LUNG (HCC): ICD-10-CM

## 2022-11-08 PROCEDURE — 99214 OFFICE O/P EST MOD 30 MIN: CPT | Performed by: INTERNAL MEDICINE

## 2022-11-08 PROCEDURE — 1123F ACP DISCUSS/DSCN MKR DOCD: CPT | Performed by: INTERNAL MEDICINE

## 2022-11-08 PROCEDURE — 3078F DIAST BP <80 MM HG: CPT | Performed by: INTERNAL MEDICINE

## 2022-11-08 PROCEDURE — 3074F SYST BP LT 130 MM HG: CPT | Performed by: INTERNAL MEDICINE

## 2022-11-08 PROCEDURE — 99211 OFF/OP EST MAY X REQ PHY/QHP: CPT | Performed by: INTERNAL MEDICINE

## 2022-11-08 NOTE — PROGRESS NOTES
Patient ID: Bhavna Sommers, 1955, [id number], 79 y.o. Referred by : Delano Blake MD  Diagnosis:   Oropharyngeal carcin shirley, left tonsil squamous cell carcinoma, p16 positive, clinical stage T2 a N0 M0, stage I diseasePatient has been evaluated by ENT surgical oncology at [facility] and as per the tumor board recommendation definitive radiation therapy was recommended   Patient started on definitive radiation therapy on 11/9/2021   He  completed RT on December 29, 2021     Cancer Staging  Oropharyngeal cancer [facility])  Staging form: Pharynx - HPV-Mediated Oropharynx, AJCC 8th Edition  - Clinical stage from 10/12/2021: Stage I (cT2, cN0, cM0) - Signed by Tiffanie Alberto MD on 10/12/2021    Primary malignant neoplasm of left lower lobe of lung ([facility] [address]  Staging form: Lung, AJCC 8th Edition  - Clinical stage from 4/6/2022: Stage IA2 (cT1b, cN0, cM0) - Signed by Jose Antonio Hussein MD on 4/13/2022    3/28/2022 had a PET scan which showed resolution of uptake in the tonsil and no active lymph node in the cervical region however 1.9 cm left lower lobe nodule noted with FDG activity. Patient had CT-guided biopsy on 4/6/2022 which showed adenocarcinoma  Patient underwent lower lobectomy on 5/23/2022. Final surgical pathology showed T2 a N0 M0, with invasion of the visceral pleura with extension to the pleural surface  7/19/22  started on adjuvant chemo with Amy Alimta  Cycle #4  given on 9/20/22    HISTORY OF PRESENT ILLNESS:    Oncologic History:  Bhavna Sommers is 79 y.o. male was seen during initial consultation with for newly diagnosed left tonsillar/oropharyngeal carcinoma. Patient initially presented with sore throat/throat pain in May of this year and was referred to ENT for further evaluation. His ENT evaluation did show 3 cm left tonsillar mass limited to tonsils only. Patient had a CT of the neck on 9/22/2021 which showed 3.2 cm mass in the left palatine tonsil without any abnormal lymphadenopathy. Patient was evaluated by ENT and had a biopsy of the left tonsil which showed poorly differentiated invasive squamous cell carcinoma, p16 positive. Subsequently patient had a PET scan on 10/6/2021 which showed no distant metastasis and no lymph node metastasis noted. Patient has been evaluated by radiation oncology  He denies any unintentional weight loss, drenching night sweats fever chills. INTERVAL HISTORY  Patient is returning for follow-up visit and to discuss lab results, CT scan results and further recommendations. His fatigue is improving. He denies any chest pain, shortness of breath. He denies any abdominal pain nausea vomiting. His recent CT scan showed no evidence of active disease. During this visit patient's allergy, social, medical, surgical history and medications were reviewed and updated. Past Medical History:   Diagnosis Date    COVID-19     home test, mild URI sx's, no hospitalization    Health care maintenance     PCP- Myesha Berry-  seen  6/2021    History of atrial fibrillation     follows with [facility], on eliquis    Hyperlipidemia     Hypertension     Lung cancer ([facility] [address]     Tonsillar cancer ([facility] [address] 09/2021    Tonsilar cancer. ENT -Dr Pk Cabrera ,       Past Surgical History:   Procedure Laterality Date    ACHILLES TENDON SURGERY      about 30 years ago     COLONOSCOPY      CT NEEDLE BIOPSY LUNG PERCUTANEOUS  4/6/2022    CT NEEDLE BIOPSY LUNG PERCUTANEOUS 4/6/2022 [facility] CT SCAN    IR PORT PLACEMENT EQUAL OR GREATER THAN 5 YEARS  7/12/2022    IR PORT PLACEMENT EQUAL OR GREATER THAN 5 YEARS 7/12/2022 Everardo Castro MD [facility] SPECIAL PROCEDURES    LOBECTOMY Left 05/23/2022    robotic video assisted thoracoscoopy, lower lobectomy with lymphnode dissection    LUNG REMOVAL, TOTAL Left 5/23/2022    XI ROBOTIC VIDEO ASSISTED THORACOSCOPY, LOWER LOBECTOMY performed by Nery Butts MD at [place] [id number] Left 05/23/2022       Allergies   Allergen Reactions    Seasonal Other (See Comments)     Runny nose scratchy throat       Current Outpatient Medications   Medication Sig Dispense Refill    hydroCHLOROthiazide (HYDRODIURIL) 12.5 MG tablet Take 1 tablet by mouth daily 90 tablet 1    simvastatin (ZOCOR) 20 MG tablet TAKE 1 TABLET BY MOUTH AT  NIGHT 90 tablet 1    flecainide (TAMBOCOR) 50 MG tablet       metoprolol tartrate (LOPRESSOR) 25 MG tablet Take 1 tablet by mouth 2 times daily (Patient taking differently: Take 25 mg by mouth 2 times daily 1/2 tablet in am 1 tablet in pm) 60 tablet 3    lisinopril (PRINIVIL;ZESTRIL) 10 MG tablet Take 1 tablet by mouth daily 30 tablet 3    apixaban (ELIQUIS) 5 MG TABS tablet Take 1 tablet by mouth 2 times daily 120 tablet 4    omeprazole (PRILOSEC) 20 MG delayed release capsule Take 20 mg by mouth daily Pt takes 10mg daily      Multiple Vitamins-Minerals (THERAPEUTIC MULTIVITAMIN-MINERALS) tablet Take 1 tablet by mouth daily      folic acid (FOLVITE) 1 MG tablet Take 1 tablet by mouth in the morning. Start 7 days prior to chemotherapy. (Patient not taking: No sig reported) 90 tablet 3     No current facility-administered medications for this visit.        Social History     Socioeconomic History    Marital status:      Spouse name: Not on file    Number of children: Not on file    Years of education: Not on file    Highest education level: Not on file   Occupational History    Not on file   Tobacco Use    Smoking status: Former     Types: Cigarettes     Quit date: 3/30/2022     Years since quittin.6    Smokeless tobacco: Never    Tobacco comments:     states quit smoking \"40 yrs ago\"   Vaping Use    Vaping Use: Never used   Substance and Sexual Activity    Alcohol use: Yes     Comment: socially     Drug use: Not Currently    Sexual activity: Yes     Partners: Female   Other Topics Concern    Not on file   Social History Narrative    Not on file     Social Determinants of Health     Financial Resource Strain: Low Risk     Difficulty of Paying Living Expenses: Not very hard   Food Insecurity: No Food Insecurity    Worried About Running Out of Food in the Last Year: Never true    Ran Out of Food in the Last Year: Never true   Transportation Needs: Not on file   Physical Activity: Not on file   Stress: Not on file   Social Connections: Not on file   Intimate Partner Violence: Not on file   Housing Stability: Not on file       Family History   Problem Relation Age of Onset    Coronary Art Dis Mother     Cancer Father         REVIEW OF SYSTEM:     Constitutional: No fever or chills. No night sweats, no weight loss   Eyes: No eye discharge, double vision, or eye pain   HEENT: negative for sore mouth, sore throat, hoarseness and voice change   Respiratory: negative for cough , sputum, dyspnea, wheezing, hemoptysis, chest pain   Cardiovascular: negative for chest pain, dyspnea, palpitations, orthopnea, PND   Gastrointestinal: negative for nausea, vomiting, diarrhea, constipation, abdominal pain, Dysphagia, hematemesis and hematochezia   Genitourinary: negative for frequency, dysuria, nocturia, urinary incontinence, and hematuria   Integument: negative for rash, skin lesions, bruises.    Hematologic/Lymphatic: negative for easy bruising, bleeding, lymphadenopathy, petechiae and swelling/edema   Endocrine: negative for heat or cold intolerance, tremor, weight changes, change in bowel habits and hair loss   Musculoskeletal: negative for myalgias, arthralgias, pain, joint swelling,and bone pain   Neurological: negative for headaches, dizziness, seizures, weakness, numbness       OBJECTIVE:         Vitals:    11/08/22 1035   BP: 128/73   Pulse: 55   Temp: 97 °F (36.1 °C)         PHYSICAL EXAM:   General appearance - well appearing, no in pain or distress   Mental status - alert and cooperative   Eyes - pupils equal and reactive, extraocular eye movements intact   Ears - bilateral TM's and external ear canals normal   Mouth - mucous membranes moist, pharynx normal without lesions   Neck - supple, no significant adenopathy   Lymphatics - no palpable lymphadenopathy, no hepatosplenomegaly   Chest - clear to auscultation, no wheezes, rales or rhonchi, symmetric air entry   Heart - normal rate, regular rhythm, normal S1, S2, no murmurs, rubs, clicks or gallops   Abdomen - soft, nontender, nondistended, no masses or organomegaly   Neurological - alert, oriented, normal speech, no focal findings or movement disorder noted   Musculoskeletal - no joint tenderness, deformity or swelling   Extremities - peripheral pulses normal, no pedal edema, no clubbing or cyanosis   Skin - normal coloration and turgor, no rashes, no suspicious skin lesions noted ,      LABORATORY DATA:     Lab Results   Component Value Date    WBC 3.8 11/01/2022    HGB 12.5 (L) 11/01/2022    HCT 35.9 (L) 11/01/2022    MCV 92.9 11/01/2022     11/01/2022    LYMPHOPCT 22 (L) 11/01/2022    RBC 3.87 (L) 11/01/2022    MCH 32.2 11/01/2022    MCHC 34.7 11/01/2022    RDW 18.0 (H) 11/01/2022    MONOPCT 12 (H) 11/01/2022    BASOPCT 1 11/01/2022    NEUTROABS 2.40 11/01/2022    LYMPHSABS 0.80 (L) 11/01/2022    MONOSABS 0.50 11/01/2022    EOSABS 0.10 11/01/2022    BASOSABS 0.00 11/01/2022         Chemistry        Component Value Date/Time     11/01/2022 0834    K 4.1 11/01/2022 0834     11/01/2022 0834    CO2 26 11/01/2022 0834    BUN 12 11/01/2022 0834    CREATININE 0.89 11/01/2022 0834        Component Value Date/Time    CALCIUM 9.4 11/01/2022 0834    ALKPHOS 74 11/01/2022 0834    AST 19 11/01/2022 0834    ALT 15 11/01/2022 0834    BILITOT 0.7 11/01/2022 0834        PATHOLOGY DATA:   10/1/2021  8:43 AM - Dangelo, [facility] Incoming Lab Results From [facility]    Component Collected Lab   Surgical Pathology Report 09/27/2021 10:13  [address]   -- Diagnosis --   LEFT TONSIL, BIOPSIES:             -  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 Surgical Pathology Report  SURGICAL PATHOLOGY REPORT  Collected: 04/06/22 1230   Lab status: Final   Resulting lab: [facility]   Value: -- Diagnosis --   LUNG, LEFT LOWER LOBE, CT-GUIDED CORE NEEDLE BIOPSY:        -  ADENOCARCINOMA, CONSISTENT WITH LUNG PRIMARY. Surgical Pathology Report  SURGICAL PATHOLOGY REPORT  Collected: 05/23/22 1235   Lab status: Final   Resulting lab: [facility]   Value: -- Diagnosis --   LUNG, LEFT LOWER LOBE, LOBECTOMY:   -LUNG INVASIVE ACINAR ADENOCARCINOMA, POORLY DIFFERENTIATED, SIZE 2.8   CM, WITH INVASION OF THE VISCERAL PLEURA WITH EXTENSION TO THE PLEURAL   SURFACE. -MARGINS NEGATIVE.     -FIVE (5) LYMPH NODES NEGATIVE FOR CARCINOMA. DISTANT SITE(S) INVOLVED, IF APPLICABLE: N/A. PATHOLOGIC STAGE CLASSIFICATION:     pT2a  pN0     IMAGING DATA:    PET CT SKULL BASE TO MID THIGH  Narrative: EXAMINATION:  WHOLE BODY PET/CT  10/6/2021    TECHNIQUE:  Following IV injection of 11.4 mCi of F 18 -FDG, PET  tumor imaging was  acquired from the base of the skull to the mid thighs. Computed tomography  was used for purposes of attenuation correction and anatomic localization. Fusion imaging was utilized for interpretation. Uptake time 59 mins. Glucose level 105 mg/dl. COMPARISON:  CT neck 09/22/2021    HISTORY:  Reason for Exam: Malignant neoplasm of tonsillar fossa  Acuity: Acute  Type of Exam: Initial    FINDINGS:  HEAD/NECK: Redemonstration of left palatine tonsillar lesion which is FDG  avid with max SUV of 12.0. No metabolically active cervical lymphadenopathy. 8 mm posterior right thyroid hypodense nodule which is FDG avid with max SUV  of 6.0. CHEST: Left retrocardiac lower lobe nodular area of opacification measuring 2  cm which demonstrates faint uptake with max SUV of 2.3. No metabolically  active axillary, hilar, or mediastinal lymphadenopathy.     ABDOMEN/PELVIS: No metabolically active intraperitoneal mass. No  metabolically active abdominal or pelvic lymphadenopathy. Physiologic  activity in the gastrointestinal and genitourinary systems. BONES/SOFT TISSUE: No abnormal FDG activity localizes to the bones. No  aggressive osseous lesion. INCIDENTAL CT FINDINGS: Multivessel coronary artery calcification. Aortic  atherosclerosis most pronounced in the infrarenal segment. Sigmoid  diverticulosis. Impression: 1. Redemonstration of left palatine tonsillar lesion which is FDG avid  consistent with history of malignancy. 2. Posterior right thyroid hypodense nodule which is FDG avid, recommend  further evaluation with dedicated ultrasound. 3. Left retrocardiac lower lobe nodular area of opacification as measured  above which demonstrates faint uptake and is favored to represent possible  pneumonia or other infectious/inflammatory process though  metastatic/neoplastic etiology not definitively excluded. Recommend post  treatment chest CT in 6-8 weeks to reassess. If persistent at time of  follow-up then further evaluation can be obtained with tissue sampling. PET 3/38/22  NIRADS score for Neck Primary: 1: No evidence of recurrence: routine   surveillance, CECT       NIRADS score for Neck Nodes: 1: No evidence of recurrence: routine   surveillance. There continues to be focal activity in a small nodule in the right thyroid   lobe. Left lower lobe pulmonary nodule with increasing FDG avidity. This could   potentially represent a primary lung cancer or metastatic disease. Benign   etiologies remain a consideration       ASSESSMENT:    Jazz Record is a 79 y.o. pleasant male with oropharyngeal cancer and now with left lower lobe adenocarcinoma    Oropharyngeal cancer: Diagnosed oropharyngeal cancer, p16 positive, clinical stage T2 a N0 M0, stage I disease.   Patient completed definitive radiation therapy and now was on surveillance   I reviewed the NCCN guidelines and goals of care with patient. Left lower lobe lung adenocarcinoma. Patient underwent left lower lobectomy on 5/23/2022 and final pathology showed T2AN0 disease with visceral pleural invasion and now currently on adjuvant systemic chemotherapy and completed  cycle #4    Thyroid nodule: We will follow with ultrasound of the thyroid  During today's visit, the patient and the family had a number of reasonable questions which were answered to their satisfaction. They verbalized understanding of the information provided and they agreed to proceed as outlined above. PLAN:   I reviewed recent lab work, imaging studies, DEXA diagnosis and treatment recommendations   CT scan showed no active disease   Plan for port removal   He will be followed up as per NCCN guidelines   Return to clinic in 3 months with labs prior or earlier if needed         Charles Yuan MD  Hematologist/Medical Oncologist    On this date 11/8/22  I have spent 40 minutes reviewing previous notes, test results and face to face with the patient discussing the diagnosis and importance of compliance with the treatment plan. Greater than 50% of that time was spent face-to-face with the patient in counseling and coordinating her care. This note is created with the assistance of a speech recognition program.  While intending to generate a document that actually reflects the content of the visit, the document can still have some errors including those of syntax and sound a like substitutions which may escape proof reading. It such instances, actual meaning can be extrapolated by contextual diversion.     Pooja Kee MD

## 2022-11-09 ENCOUNTER — TELEPHONE (OUTPATIENT)
Dept: ONCOLOGY | Age: 67
End: 2022-11-09

## 2022-11-09 NOTE — TELEPHONE ENCOUNTER
AVS from 11/8/22      PORT removal  RTC in 3 months with labs      Writer notified IR to scheduled port removal with pt    Rv scheduled for 2/9 @ 8:45 am     Pt was given AVS and appointment schedule      Electronically signed by Tammi Hurley on 11/9/2022 at 8:14 AM

## 2022-11-10 ENCOUNTER — HOSPITAL ENCOUNTER (OUTPATIENT)
Dept: PREADMISSION TESTING | Age: 67
Discharge: HOME | End: 2022-11-10

## 2022-11-16 NOTE — DISCHARGE INSTRUCTIONS
DISCHARGE INSTRUCTIONS FOR HAND/WRIST SURGERY    In order to continue your care at home, please follow the instructions below. For General Anesthesia  Do not drink any alcoholic beverages or make any legal or important decisions for 24 hours. Diet    After general anesthesia, start out eating lightly (broth, soup, crackers, toast, etc.) advancing as tolerated to your usual diet. Try to avoid spicy or greasy/fatty foods for 24 hours. Drink plenty of fluids after surgery, unless you are on a fluid restriction. Avoid milk/milk product for several hours. Medications  Take medications as instructed by your surgeon. Please do not take prescribed pain medication with alcoholic beverages. When cleared to drive by your surgeon, please do not drive or operate machinery while taking any prescribed pain medication. Activities  As instructed by your surgeon  Limit your activities for 24 hours  Avoid heavy work or sports until surgeon approves. No lifting, pushing, pulling, twisting, or pressing down on hand or wrist.  No driving or operating machinery until cleared by surgeon. May shower as long as dressings stay dry with plastic bag coverage. Surgery Area  Always keep dressings/incisions clean, dry. Do not remove dressings until seen in office, unless instructed otherwise by your surgeon. To reduce swelling and pain, keep surgical hand/wrist elevated above heart level with pillows. Call your surgeon for the following: You have pain that does not get better after you take pain medicine. For an oral temperature (by mouth) is 101 degrees or higher, chills, or excessive sweating. You have increasing and progressive bleeding or drainage from surgery site. Signs of an infection:  increased swelling, redness, warmth, or hardness around surgery area or yellow or green drainage from incision. If your fingers are pale, blue or cold to touch. If swelling increases while elevated.   Persistent nausea or vomiting and cant keep fluids down. If you are unable to urinate within 8 hours of surgery. Redness or swelling at IV site. For any questions or concerns you may have.

## 2022-11-17 ENCOUNTER — TELEPHONE (OUTPATIENT)
Dept: ONCOLOGY | Age: 67
End: 2022-11-17

## 2022-11-17 ENCOUNTER — ANESTHESIA EVENT (OUTPATIENT)
Dept: OPERATING ROOM | Age: 67
End: 2022-11-17
Payer: MEDICARE

## 2022-11-17 NOTE — TELEPHONE ENCOUNTER
Name: Darci Coyle  : 1955  MRN: [id number]    Oncology Navigation Follow-Up Note    Contact Type:  Telephone    Notes: Attempted to contact pt for f/u call, no answer, VM left instructed pt may contact writer prn. Will continue to follow.     Electronically signed by Moira Montero RN on 2022 at 2:42 PM

## 2022-11-22 ENCOUNTER — ANESTHESIA (OUTPATIENT)
Dept: OPERATING ROOM | Age: 67
End: 2022-11-22
Payer: MEDICARE

## 2022-11-22 ENCOUNTER — HOSPITAL ENCOUNTER (OUTPATIENT)
Age: 67
Setting detail: OUTPATIENT SURGERY
Discharge: HOME | End: 2022-11-22
Attending: PLASTIC SURGERY | Admitting: PLASTIC SURGERY
Payer: MEDICARE

## 2022-11-22 VITALS
WEIGHT: 170 LBS | HEIGHT: 68 IN | RESPIRATION RATE: 9 BRPM | OXYGEN SATURATION: 98 % | HEART RATE: 47 BPM | BODY MASS INDEX: 25.76 KG/M2 | DIASTOLIC BLOOD PRESSURE: 78 MMHG | SYSTOLIC BLOOD PRESSURE: 139 MMHG | TEMPERATURE: 97 F

## 2022-11-22 DIAGNOSIS — M72.0 DUPUYTREN CONTRACTURE: ICD-10-CM

## 2022-11-22 PROCEDURE — 7100000000 HC PACU RECOVERY - FIRST 15 MIN: Performed by: PLASTIC SURGERY

## 2022-11-22 PROCEDURE — 3700000001 HC ADD 15 MINUTES (ANESTHESIA): Performed by: PLASTIC SURGERY

## 2022-11-22 PROCEDURE — 6370000000 HC RX 637 (ALT 250 FOR IP): Performed by: PLASTIC SURGERY

## 2022-11-22 PROCEDURE — 88304 TISSUE EXAM BY PATHOLOGIST: CPT

## 2022-11-22 PROCEDURE — 6360000002 HC RX W HCPCS: Performed by: NURSE ANESTHETIST, CERTIFIED REGISTERED

## 2022-11-22 PROCEDURE — 3600000012 HC SURGERY LEVEL 2 ADDTL 15MIN: Performed by: PLASTIC SURGERY

## 2022-11-22 PROCEDURE — 7100000011 HC PHASE II RECOVERY - ADDTL 15 MIN: Performed by: PLASTIC SURGERY

## 2022-11-22 PROCEDURE — 3600000002 HC SURGERY LEVEL 2 BASE: Performed by: PLASTIC SURGERY

## 2022-11-22 PROCEDURE — 7100000010 HC PHASE II RECOVERY - FIRST 15 MIN: Performed by: PLASTIC SURGERY

## 2022-11-22 PROCEDURE — 2709999900 HC NON-CHARGEABLE SUPPLY: Performed by: PLASTIC SURGERY

## 2022-11-22 PROCEDURE — 7100000001 HC PACU RECOVERY - ADDTL 15 MIN: Performed by: PLASTIC SURGERY

## 2022-11-22 PROCEDURE — 3700000000 HC ANESTHESIA ATTENDED CARE: Performed by: PLASTIC SURGERY

## 2022-11-22 PROCEDURE — 2500000003 HC RX 250 WO HCPCS: Performed by: NURSE ANESTHETIST, CERTIFIED REGISTERED

## 2022-11-22 PROCEDURE — 2580000003 HC RX 258: Performed by: ANESTHESIOLOGY

## 2022-11-22 PROCEDURE — 2500000003 HC RX 250 WO HCPCS: Performed by: PLASTIC SURGERY

## 2022-11-22 RX ORDER — SODIUM CHLORIDE 0.9 % (FLUSH) 0.9 %
5-40 SYRINGE (ML) INJECTION PRN
Status: DISCONTINUED | OUTPATIENT
Start: 2022-11-22 | End: 2022-11-22 | Stop reason: HOSPADM

## 2022-11-22 RX ORDER — DIPHENHYDRAMINE HYDROCHLORIDE 50 MG/ML
12.5 INJECTION INTRAMUSCULAR; INTRAVENOUS
Status: DISCONTINUED | OUTPATIENT
Start: 2022-11-22 | End: 2022-11-22 | Stop reason: HOSPADM

## 2022-11-22 RX ORDER — SODIUM CHLORIDE 9 MG/ML
INJECTION, SOLUTION INTRAVENOUS PRN
Status: DISCONTINUED | OUTPATIENT
Start: 2022-11-22 | End: 2022-11-22 | Stop reason: HOSPADM

## 2022-11-22 RX ORDER — CEPHALEXIN 500 MG/1
500 CAPSULE ORAL 3 TIMES DAILY
Qty: 15 CAPSULE | Refills: 0 | Status: SHIPPED | OUTPATIENT
Start: 2022-11-22 | End: 2022-11-27

## 2022-11-22 RX ORDER — OXYCODONE HYDROCHLORIDE AND ACETAMINOPHEN 5; 325 MG/1; MG/1
1 TABLET ORAL EVERY 6 HOURS PRN
Qty: 28 TABLET | Refills: 0 | Status: SHIPPED | OUTPATIENT
Start: 2022-11-22 | End: 2022-11-29

## 2022-11-22 RX ORDER — SODIUM CHLORIDE, SODIUM LACTATE, POTASSIUM CHLORIDE, CALCIUM CHLORIDE 600; 310; 30; 20 MG/100ML; MG/100ML; MG/100ML; MG/100ML
INJECTION, SOLUTION INTRAVENOUS CONTINUOUS
Status: DISCONTINUED | OUTPATIENT
Start: 2022-11-22 | End: 2022-11-22 | Stop reason: HOSPADM

## 2022-11-22 RX ORDER — PROPOFOL 10 MG/ML
INJECTION, EMULSION INTRAVENOUS PRN
Status: DISCONTINUED | OUTPATIENT
Start: 2022-11-22 | End: 2022-11-22 | Stop reason: SDUPTHER

## 2022-11-22 RX ORDER — DEXAMETHASONE SODIUM PHOSPHATE 10 MG/ML
INJECTION, SOLUTION INTRAMUSCULAR; INTRAVENOUS PRN
Status: DISCONTINUED | OUTPATIENT
Start: 2022-11-22 | End: 2022-11-22 | Stop reason: SDUPTHER

## 2022-11-22 RX ORDER — MIDAZOLAM HYDROCHLORIDE 1 MG/ML
INJECTION INTRAMUSCULAR; INTRAVENOUS PRN
Status: DISCONTINUED | OUTPATIENT
Start: 2022-11-22 | End: 2022-11-22 | Stop reason: SDUPTHER

## 2022-11-22 RX ORDER — SODIUM CHLORIDE 9 MG/ML
INJECTION, SOLUTION INTRAVENOUS CONTINUOUS
Status: DISCONTINUED | OUTPATIENT
Start: 2022-11-22 | End: 2022-11-22 | Stop reason: HOSPADM

## 2022-11-22 RX ORDER — MEPERIDINE HYDROCHLORIDE 50 MG/ML
12.5 INJECTION INTRAMUSCULAR; INTRAVENOUS; SUBCUTANEOUS ONCE
Status: DISCONTINUED | OUTPATIENT
Start: 2022-11-22 | End: 2022-11-22 | Stop reason: HOSPADM

## 2022-11-22 RX ORDER — SODIUM CHLORIDE 9 MG/ML
25 INJECTION, SOLUTION INTRAVENOUS PRN
Status: DISCONTINUED | OUTPATIENT
Start: 2022-11-22 | End: 2022-11-22 | Stop reason: HOSPADM

## 2022-11-22 RX ORDER — ONDANSETRON 2 MG/ML
INJECTION INTRAMUSCULAR; INTRAVENOUS PRN
Status: DISCONTINUED | OUTPATIENT
Start: 2022-11-22 | End: 2022-11-22 | Stop reason: SDUPTHER

## 2022-11-22 RX ORDER — LIDOCAINE HYDROCHLORIDE 10 MG/ML
INJECTION, SOLUTION INFILTRATION; PERINEURAL PRN
Status: DISCONTINUED | OUTPATIENT
Start: 2022-11-22 | End: 2022-11-22 | Stop reason: SDUPTHER

## 2022-11-22 RX ORDER — BUPIVACAINE HYDROCHLORIDE AND EPINEPHRINE 5; 5 MG/ML; UG/ML
INJECTION, SOLUTION EPIDURAL; INTRACAUDAL; PERINEURAL PRN
Status: DISCONTINUED | OUTPATIENT
Start: 2022-11-22 | End: 2022-11-22 | Stop reason: ALTCHOICE

## 2022-11-22 RX ORDER — CEFAZOLIN 2 G/1
INJECTION, POWDER, FOR SOLUTION INTRAMUSCULAR; INTRAVENOUS
Status: DISCONTINUED
Start: 2022-11-22 | End: 2022-11-22 | Stop reason: HOSPADM

## 2022-11-22 RX ORDER — SODIUM CHLORIDE 0.9 % (FLUSH) 0.9 %
5-40 SYRINGE (ML) INJECTION EVERY 12 HOURS SCHEDULED
Status: DISCONTINUED | OUTPATIENT
Start: 2022-11-22 | End: 2022-11-22 | Stop reason: HOSPADM

## 2022-11-22 RX ORDER — GINSENG 100 MG
CAPSULE ORAL PRN
Status: DISCONTINUED | OUTPATIENT
Start: 2022-11-22 | End: 2022-11-22 | Stop reason: ALTCHOICE

## 2022-11-22 RX ORDER — CEFAZOLIN SODIUM 2 G/50ML
SOLUTION INTRAVENOUS PRN
Status: DISCONTINUED | OUTPATIENT
Start: 2022-11-22 | End: 2022-11-22 | Stop reason: SDUPTHER

## 2022-11-22 RX ORDER — MORPHINE SULFATE 2 MG/ML
1 INJECTION, SOLUTION INTRAMUSCULAR; INTRAVENOUS EVERY 5 MIN PRN
Status: DISCONTINUED | OUTPATIENT
Start: 2022-11-22 | End: 2022-11-22 | Stop reason: HOSPADM

## 2022-11-22 RX ORDER — ONDANSETRON 2 MG/ML
4 INJECTION INTRAMUSCULAR; INTRAVENOUS
Status: DISCONTINUED | OUTPATIENT
Start: 2022-11-22 | End: 2022-11-22 | Stop reason: HOSPADM

## 2022-11-22 RX ADMIN — SODIUM CHLORIDE, POTASSIUM CHLORIDE, SODIUM LACTATE AND CALCIUM CHLORIDE: 600; 310; 30; 20 INJECTION, SOLUTION INTRAVENOUS at 07:47

## 2022-11-22 RX ADMIN — PROPOFOL 200 MG: 10 INJECTION, EMULSION INTRAVENOUS at 07:47

## 2022-11-22 RX ADMIN — ONDANSETRON 4 MG: 2 INJECTION INTRAMUSCULAR; INTRAVENOUS at 07:52

## 2022-11-22 RX ADMIN — MIDAZOLAM 2 MG: 1 INJECTION INTRAMUSCULAR; INTRAVENOUS at 07:45

## 2022-11-22 RX ADMIN — LIDOCAINE HYDROCHLORIDE 20 MG: 10 INJECTION, SOLUTION INFILTRATION; PERINEURAL at 07:47

## 2022-11-22 RX ADMIN — CEFAZOLIN SODIUM 2000 MG: 2 SOLUTION INTRAVENOUS at 07:52

## 2022-11-22 RX ADMIN — DEXAMETHASONE SODIUM PHOSPHATE 10 MG: 10 INJECTION INTRAMUSCULAR; INTRAVENOUS at 07:52

## 2022-11-22 ASSESSMENT — PAIN - FUNCTIONAL ASSESSMENT: PAIN_FUNCTIONAL_ASSESSMENT: NONE - DENIES PAIN

## 2022-11-22 NOTE — H&P
Office Note     CHRISTIAN Vargas MD, FACS     Subjective:      Patient ID: Jasper Ulrich is a 79 y.o. male. HPI  78 yo M presents today to discuss dupuytrens contracture giselle hands. Has been going on for a few years, maybe 5 by his description. 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 ENT -Dr Denia Almeida ,         Past Surgical History         Past Surgical History:   Procedure Laterality Date    ACHILLES TENDON SURGERY         about 30 years ago     COLONOSCOPY        CT NEEDLE BIOPSY LUNG PERCUTANEOUS   4/6/2022     CT NEEDLE BIOPSY LUNG PERCUTANEOUS 4/6/2022 [facility] CT SCAN    IR PORT PLACEMENT EQUAL OR GREATER THAN 5 YEARS   7/12/2022     IR PORT PLACEMENT EQUAL OR GREATER THAN 5 YEARS 7/12/2022 Paulina Vasquez MD [facility] SPECIAL PROCEDURES    LOBECTOMY Left 05/23/2022     robotic video assisted thoracoscoopy, lower lobectomy with lymphnode dissection    LUNG REMOVAL, TOTAL Left 5/23/2022     XI ROBOTIC VIDEO ASSISTED THORACOSCOPY, LOWER LOBECTOMY performed by Julienne Boo MD at [place] [address] Left 05/23/2022               Allergies   Allergen Reactions    Seasonal Other (See Comments)       Runny nose scratchy throat      Current Facility-Administered Medications          Current Outpatient Medications   Medication Sig Dispense Refill    apixaban (ELIQUIS) 5 MG TABS tablet Take 1 tablet by mouth 2 times daily 120 tablet 4    flecainide (TAMBOCOR) 50 MG tablet          folic acid (FOLVITE) 1 MG tablet Take 1 tablet by mouth in the morning. Start 7 days prior to chemotherapy. 90 tablet 3    lidocaine-prilocaine (EMLA) 2.5-2.5 % cream Apply topically 45-60 mins prior to needle poke daily PRN 1 each 2    ondansetron (ZOFRAN-ODT) 8 MG TBDP disintegrating tablet Place 1 tablet under the tongue 3 times daily as needed for Nausea or Vomiting (Patient not taking: No sig reported) 90 tablet 2    prochlorperazine (COMPAZINE) 10 MG tablet Take 1 tablet by mouth every 6 hours as needed (nausea vomiting) (Patient not taking: No sig reported) 120 tablet 2    metoprolol tartrate (LOPRESSOR) 25 MG tablet Take 1 tablet by mouth 2 times daily (Patient taking differently: Take 25 mg by mouth 2 times daily 1/2 tablet in am 1 tablet in pm) 60 tablet 3    lisinopril (PRINIVIL;ZESTRIL) 10 MG tablet Take 1 tablet by mouth daily 30 tablet 3    famotidine (PEPCID) 20 MG tablet Take 1 tablet by mouth 2 times daily 60 tablet 3    lidocaine 4 % external patch Place 1 patch onto the skin daily (Patient not taking: No sig reported) 15 patch 0    simvastatin (ZOCOR) 20 MG tablet TAKE 1 TABLET BY MOUTH AT  NIGHT 90 tablet 3    hydroCHLOROthiazide (HYDRODIURIL) 12.5 MG tablet TAKE 1 TABLET BY MOUTH  DAILY 60 tablet 5    omeprazole (PRILOSEC) 20 MG delayed release capsule Take 20 mg by mouth daily Pt takes 10mg daily        Multiple Vitamins-Minerals (THERAPEUTIC MULTIVITAMIN-MINERALS) tablet Take 1 tablet by mouth daily          No current facility-administered medications for this visit.          Social History               Socioeconomic History    Marital status:        Spouse name: Not on file    Number of children: Not on file    Years of education: Not on file    Highest education level: Not on file   Occupational History    Not on file   Tobacco Use    Smoking status: Former       Types: Cigarettes       Quit date: 3/30/2022       Years since quittin.5    Smokeless tobacco: Never    Tobacco comments:       states quit smoking \"40 yrs ago\"   Vaping Use    Vaping Use: Never used   Substance and Sexual Activity    Alcohol use: Yes       Comment: socially     Drug use: Not Currently    Sexual activity: Yes       Partners: Female   Other Topics Concern    Not on file   Social History Narrative    Not on file      Social Determinants of Health      Financial Resource Strain: Not on file   Food Insecurity: Not on file   Transportation Needs: Not on file   Physical Activity: Not on file   Stress: Not on file   Social Connections: Not on file   Intimate Partner Violence: Not on file   Housing Stability: Not on file         Family History         Family History   Problem Relation Age of Onset    Coronary Art Dis Mother      Cancer Father           Review of systems is otherwise negative. /79   Pulse 64   Resp 18   Ht 5' 8\" (1.727 m)   Wt 170 lb (77.1 kg)   BMI 25.85 kg/m²         Objective:   Physical Exam  Vitals and nursing note reviewed. Constitutional:       Appearance: He is well-developed. HENT:      Head: Normocephalic and atraumatic. Eyes:      Conjunctiva/sclera: Conjunctivae normal.      Pupils: Pupils are equal, round, and reactive to light. Cardiovascular:      Rate and Rhythm: Normal rate. Pulmonary:      Effort: Pulmonary effort is normal. No respiratory distress. Breath sounds: No wheezing. Abdominal:      General: There is no distension. Palpations: Abdomen is soft. Musculoskeletal:         General: No tenderness. Normal range of motion. Cervical back: Normal range of motion and neck supple. Skin:     General: Skin is warm and dry. Findings: No erythema or rash. Neurological:      Mental Status: He is alert and oriented to person, place, and time. Psychiatric:         Behavior: Behavior normal.         Thought Content: Thought content normal.   Patient has Dupuytren's in the left hand with an ulnar band on the small finger as well as ring finger MCP.      Assessment:   Left hand Dupuytren's                Plan:   Excision of Dupuytren's of the left small finger and left palm near the MCP of the ring finger                   The patient was evaluated and examined with my nurse in the room at all times. Portions of this note were transcribed using Dragon voice recognition technology and as such may reflect some variations in voice recognition. COVID-19 precautions were taken throughout the entire office visit. Patient was screened for COVID-19 symptoms and temperature was taken prior to coming back to the exam room. A mask as well as gloves was worn throughout the entire office visit and distancing maintained as much as possible in between the physical examination periods. Myrtle Beavers MD   H & P reviewed.  The Patient was examined and there are no changes to the H & P

## 2022-11-22 NOTE — OP NOTE
Operative Note      Patient: Sobeida Gaspar  YOB: 1955  MRN: [id number]    Date of Procedure: 11/22/2022    Pre-Op Diagnosis: LEFT SMALL FINGER DUPUYTRENS CONTRACTURE    Post-Op Diagnosis: Same       Procedure(s):  LEFT SMALL FINGER  DUPUYTRENS REPAIR AND ULNAR BAND LEFT SMALL FINGER -Dupuytren's to the left ring finger palm    Surgeon(s):  Henry Jimenez MD    Assistant:   * No surgical staff found *    Anesthesia: General    Estimated Blood Loss (mL): less than 50     Complications: None    Specimens:   ID Type Source Tests Collected by Time Destination   A : Trellis Estimable LEFT SMALL FINGER  Tissue Tissue SURGICAL PATHOLOGY Henry Jimenez MD 11/22/2022 0818    B : Terrie Abt RING FINGER  Tissue Tissue SURGICAL PATHOLOGY Henry Jimenez MD 11/22/2022 0825        Implants:  * No implants in log *      Drains: * No LDAs found *    Findings:  under digital nerve to the small finger was identified and preserved through the entire case. This was completely intact at the end of the procedure but was heavily involved in the Dupuytren's    Detailed Description of Procedure: With the patient supine general anesthesia induced via posterior pharyngeal LMA intubation the left arm was prepped and draped in a sterile fashion and appropriate timeout was performed. The tourniquet was elevated to 250 mmHg after the arm was exsanguinated with an Esmarch. Mid palmar block half percent Marcaine 1-200,000 epinephrine was performed. Dhruv incisions were signed and flaps were elevated. Ulnar digital nerve of the small finger was identified and it was noted that the Dupuytren's was primarily on the ulnar side of the finger with a very thick cord encompassing the digital nerve. Using 2.5 power loupe magnification this was dissected in a proximal to distal direction and the entire nerve was preserved. The incision encompassed distally to the DIP joint.   A large cutaneous component of the Dupuytren's was noted on the skin flaps and this was dissected free with intact thin skin flaps remaining. Complete removal was obtained and the finger was actually straight which was locked at preoperatively at approximately a 45 degree position at the PIP joint. Small Dhruv incision over a palmar nodule at the distal palmar crease of the ring finger was designed incised elevated and the nodule identified. This was excised locally at the distal palmar crease level. Tourniquet was released and bleeding was controlled electrocautery and the wounds were closed with interrupted 3-0 Prolene. Sterile dressing of Adaptic with bacitracin and a sterile wrap followed by a ulnar gutter splint with the finger straight was placed. There was excellent blood flow to the all fingers at the end of the procedure. Total tourniquet time was 27 minutes. There was no complication.     Electronically signed by Anel Carmona MD on 11/22/2022 at 8:48 AM

## 2022-11-22 NOTE — ANESTHESIA POSTPROCEDURE EVALUATION
POST- ANESTHESIA EVALUATION       Pt Name: Lore De Guzman  MRN: [id number]  YOB: 1955  Date of evaluation: 11/22/2022  Time:  9:46 AM      /78   Pulse (!) 47   Temp 97 °F (36.1 °C) (Temporal)   Resp (!) 9   Ht 5' 8\" (1.727 m)   Wt 170 lb (77.1 kg)   SpO2 98%   BMI 25.85 kg/m²      Consciousness Level  Awake  Cardiopulmonary Status  Stable  Pain Adequately Treated YES  Nausea / Vomiting  NO  Adequate Hydration  YES  Anesthesia Related Complications NONE      Electronically signed by Cece Lopez MD on 11/22/2022 at 9:46 AM       Department of Anesthesiology  Postprocedure Note    Patient: Lore De Guzman  MRN: [id number]  YOB: 1955  Date of evaluation: 11/22/2022      Procedure Summary     Date: 11/22/22 Room / Location: [address]    Anesthesia Start: [address] Anesthesia Stop: 4240    Procedure: LEFT SMALL FINGER  DUPUYTRENS REPAIR AND ULNAR BAND LEFT SMALL FINGER (Left) Diagnosis:       Dupuytren contracture      (LEFT SMALL FINGER Vernal Harris)    Surgeons: Jett Sequeira MD Responsible Provider: Cece Lopez MD    Anesthesia Type: general ASA Status: 2          Anesthesia Type: No value filed.     Ivory Phase I: Ivory Score: 10    Ivory Phase II: Ivory Score: 10      Anesthesia Post Evaluation

## 2022-11-22 NOTE — ANESTHESIA PRE PROCEDURE
Department of Anesthesiology  Preprocedure Note       Name:  Raza Ling   Age:  79 y.o.  :  1955                                          MRN:  [id number]         Date:  2022      Surgeon: Cecilia Alejandro):  Rasehed Baird MD    Procedure: Procedure(s):  LEFT SMALL FINGER  DUPUYTRENS REPAIR AND ULNAR BAND LEFT SMALL FINGER    Medications prior to admission:   Prior to Admission medications    Medication Sig Start Date End Date Taking? Authorizing Provider   hydroCHLOROthiazide (HYDRODIURIL) 12.5 MG tablet Take 1 tablet by mouth daily 22  Willem Hernandez MD   simvastatin (ZOCOR) 20 MG tablet TAKE 1 TABLET BY MOUTH AT  NIGHT 11/4/22 10/31/23  Willem Hernandez MD   flecainide [facility]) 50 MG tablet  6/15/22   Historical Provider, MD   metoprolol tartrate (LOPRESSOR) 25 MG tablet Take 1 tablet by mouth 2 times daily  Patient taking differently: Take 25 mg by mouth 2 times daily 1/2 tablet in am 1 tablet in pm 22   Dilcia Carvalho, APRN - NP   lisinopril (PRINIVIL;ZESTRIL) 10 MG tablet Take 1 tablet by mouth daily 22   Dilcia Carvalho APRN - NP   apixaban (ELIQUIS) 5 MG TABS tablet Take 1 tablet by mouth 2 times daily 22   Ko Tariq RN   omeprazole (PRILOSEC) 20 MG delayed release capsule Take 20 mg by mouth daily Pt takes 10mg daily    Historical Provider, MD   Multiple Vitamins-Minerals (THERAPEUTIC MULTIVITAMIN-MINERALS) tablet Take 1 tablet by mouth daily    Historical Provider, MD       Current medications:    No current facility-administered medications for this encounter. Allergies:     Allergies   Allergen Reactions    Seasonal Other (See Comments)     Runny nose scratchy throat       Problem List:    Patient Active Problem List   Diagnosis Code    Hyperlipidemia, mixed E78.2    Essential hypertension I10    [facility]roph[facility] ([facility] C10.9    Primary malignant neoplasm of left lower lobe of lung (HCC) C34.32    Lung nodule R91.1       Past Medical History:        Diagnosis Date    COVID-19     home test, mild URI sx's, no hospitalization    Health care maintenance     PCP- Maryellen Lima-  seen  2021    History of atrial fibrillation     follows with TCC, on eliquis    Hyperlipidemia     Hypertension     [facility]     Tonsillar cancer ([facility] 2021    Tonsilar cancer. ENT -Dr Moe Pan ,       Past Surgical History:        Procedure Laterality Date    Mau Rhodes      about 30 years ago     COLONOSCOPY      CT NEEDLE BIOPSY LUNG PERCUTANEOUS  2022    CT NEEDLE BIOPSY LUNG PERCUTANEOUS 2022 [facility] CT SCAN    IR PORT PLACEMENT EQUAL OR GREATER THAN 5 YEARS  2022    IR PORT PLACEMENT EQUAL OR GREATER THAN 5 YEARS 2022 Analy Rodriguez MD [facility] SPECIAL PROCEDURES    LOBECTOMY Left 2022    robotic video assisted thoracoscoopy, lower lobectomy with lymphnode dissection    LUNG REMOVAL, TOTAL Left 2022    XI ROBOTIC VIDEO ASSISTED THORACOSCOPY, LOWER LOBECTOMY performed by Merlin Christine MD at [address] [facility] Left 2022       Social History:    Social History     Tobacco Use    Smoking status: Former     Types: Cigarettes     Quit date: 3/30/2022     Years since quittin.6    Smokeless tobacco: Never    Tobacco comments:     states quit smoking \"40 yrs ago\"   Substance Use Topics    Alcohol use: Yes     Comment: socially                                 Counseling given: Not Answered  Tobacco comments: states quit smoking \"40 yrs ago\"      Vital Signs (Current):   Vitals:    22 1510   Weight: 170 lb (77.1 kg)                                              BP Readings from Last 3 Encounters:   22 128/73   10/31/22 134/86   10/12/22 124/79       NPO Status:                                                                                 BMI:   Wt Readings from Last 3 Encounters:   22 170 lb (77.1 kg)   22 170 lb (77.1 kg)   22 173 lb 4.8 oz (78.6 kg) Body mass index is 25.85 kg/m². CBC:   Lab Results   Component Value Date/Time    WBC 3.8 11/01/2022 08:34 AM    RBC 3.87 11/01/2022 08:34 AM    HGB 12.5 11/01/2022 08:34 AM    HCT 35.9 11/01/2022 08:34 AM    MCV 92.9 11/01/2022 08:34 AM    RDW 18.0 11/01/2022 08:34 AM     11/01/2022 08:34 AM       CMP:   Lab Results   Component Value Date/Time     11/01/2022 08:34 AM    K 4.1 11/01/2022 08:34 AM     11/01/2022 08:34 AM    CO2 26 11/01/2022 08:34 AM    BUN 12 11/01/2022 08:34 AM    CREATININE 0.89 11/01/2022 08:34 AM    GFRAA >60 09/20/2022 08:36 AM    LABGLOM >60 11/01/2022 08:34 AM    GLUCOSE 110 11/01/2022 08:34 AM    PROT 6.7 11/01/2022 08:34 AM    CALCIUM 9.4 11/01/2022 08:34 AM    BILITOT 0.7 11/01/2022 08:34 AM    ALKPHOS 74 11/01/2022 08:34 AM    AST 19 11/01/2022 08:34 AM    ALT 15 11/01/2022 08:34 AM       POC Tests: No results for input(s): POCGLU, POCNA, POCK, POCCL, POCBUN, POCHEMO, POCHCT in the last 72 hours.     Coags:   Lab Results   Component Value Date/Time    PROTIME 10.0 07/12/2022 08:51 AM    INR 0.9 07/12/2022 08:51 AM    APTT 23.8 07/12/2022 08:51 AM       HCG (If Applicable): No results found for: PREGTESTUR, PREGSERUM, HCG, HCGQUANT     ABGs: No results found for: PHART, PO2ART, FDU0QJH, NWO7ANO, BEART, Q3AMDTXM     Type & Screen (If Applicable):  No results found for: LABABO, LABRH    Drug/Infectious Status (If Applicable):  No results found for: HIV, HEPCAB    COVID-19 Screening (If Applicable): No results found for: COVID19        Anesthesia Evaluation  Patient summary reviewed and Nursing notes reviewed no history of anesthetic complications:   Airway: Mallampati: II  TM distance: >3 FB   Neck ROM: full  Mouth opening: > = 3 FB   Dental: normal exam         Pulmonary:Negative Pulmonary ROS and normal exam  breath sounds clear to auscultation                            ROS comment: Primary malignant neoplasm of left lower lobe of lung, s/p surgery Cardiovascular:  Exercise tolerance: no interval change,   (+) hypertension: no interval change, hyperlipidemia        Rhythm: regular  Rate: normal                 ROS comment: History of atrial fibrillation     Neuro/Psych:   Negative Neuro/Psych ROS              GI/Hepatic/Renal: Neg GI/Hepatic/Renal ROS            Endo/Other:    (+) malignancy/cancer. ROS comment: Oropharyngeal cancer  Abdominal:       Abdomen: soft. Vascular: negative vascular ROS. Other Findings:           Anesthesia Plan      general     ASA 2       Induction: intravenous. Anesthetic plan and risks discussed with patient. Plan discussed with CRNA.                     Owen Garcia MD   11/22/2022

## 2022-11-23 ENCOUNTER — HOSPITAL ENCOUNTER (OUTPATIENT)
Dept: INTERVENTIONAL RADIOLOGY/VASCULAR | Age: 67
Discharge: HOME | End: 2022-11-25
Payer: MEDICARE

## 2022-11-23 VITALS
RESPIRATION RATE: 18 BRPM | SYSTOLIC BLOOD PRESSURE: 155 MMHG | OXYGEN SATURATION: 97 % | DIASTOLIC BLOOD PRESSURE: 76 MMHG | HEART RATE: 58 BPM

## 2022-11-23 DIAGNOSIS — C34.32 PRIMARY MALIGNANT NEOPLASM OF LEFT LOWER LOBE OF LUNG (HCC): ICD-10-CM

## 2022-11-23 LAB — SURGICAL PATHOLOGY REPORT: NORMAL

## 2022-11-23 PROCEDURE — 36590 REMOVAL TUNNELED CV CATH: CPT

## 2022-11-23 PROCEDURE — 77001 FLUOROGUIDE FOR VEIN DEVICE: CPT

## 2022-11-23 PROCEDURE — 2709999900 IR REMOVE TUNNELED CVAD WO SQ PORT/PUMP

## 2022-11-23 NOTE — BRIEF OP NOTE
Brief Postoperative Note    Sumaya Vaz  YOB: 1955  [id number]    Pre-operative Diagnosis: Completed chemotherapy    Post-operative Diagnosis: Same    Procedure: Port removal    Anesthesia: Local    Surgeons/Assistants: Edwin    Estimated Blood Loss: less than 50     Complications: None    Specimens: Was Not Obtained    Electronically signed by Eloy Rahman MD on 11/23/2022 at 9:43 AM

## 2023-01-05 ENCOUNTER — HOSPITAL ENCOUNTER (OUTPATIENT)
Dept: RADIATION ONCOLOGY | Age: 68
Discharge: HOME | End: 2023-01-05
Attending: RADIOLOGY
Payer: MEDICARE

## 2023-01-05 ENCOUNTER — TELEPHONE (OUTPATIENT)
Dept: ONCOLOGY | Age: 68
End: 2023-01-05

## 2023-01-05 VITALS
SYSTOLIC BLOOD PRESSURE: 140 MMHG | DIASTOLIC BLOOD PRESSURE: 79 MMHG | OXYGEN SATURATION: 99 % | HEART RATE: 66 BPM | RESPIRATION RATE: 16 BRPM | WEIGHT: 172.8 LBS | BODY MASS INDEX: 26.27 KG/M2 | TEMPERATURE: 98 F

## 2023-01-05 DIAGNOSIS — C10.9 OROPHARYNGEAL CANCER (HCC): Primary | ICD-10-CM

## 2023-01-05 DIAGNOSIS — C34.32 PRIMARY MALIGNANT NEOPLASM OF LEFT LOWER LOBE OF LUNG (HCC): ICD-10-CM

## 2023-01-05 PROCEDURE — 99212 OFFICE O/P EST SF 10 MIN: CPT | Performed by: RADIOLOGY

## 2023-01-05 PROCEDURE — 31575 DIAGNOSTIC LARYNGOSCOPY: CPT | Performed by: RADIOLOGY

## 2023-01-05 PROCEDURE — 99213 OFFICE O/P EST LOW 20 MIN: CPT | Performed by: RADIOLOGY

## 2023-01-05 PROCEDURE — 31505 DIAGNOSTIC LARYNGOSCOPY: CPT | Performed by: RADIOLOGY

## 2023-01-05 PROCEDURE — 99214 OFFICE O/P EST MOD 30 MIN: CPT | Performed by: RADIOLOGY

## 2023-01-05 PROCEDURE — 99211 OFF/OP EST MAY X REQ PHY/QHP: CPT | Performed by: RADIOLOGY

## 2023-01-05 PROCEDURE — 99215 OFFICE O/P EST HI 40 MIN: CPT | Performed by: RADIOLOGY

## 2023-01-05 NOTE — TELEPHONE ENCOUNTER
Name: Anisha Todd  : 1955  MRN: [id number]    Oncology Navigation Follow-Up Note    Contact Type:  Radiation Oncology    Notes: Pt @  for Dr. Hubert Lee f/u. Met with pt prior to f/u. SCP's for H&N tx & lung tx reviewed w/pt. During review of H&N SCP pt c/o continued dry mouth, loss of taste, & difficulty swallowing in am.  Pt stated sees dentist q6 months & stated stopped using fluoride trays. Discussed importance continue use of fluoride trays. During review of lung SCP pt c/o pain @ surgical site. Pt stated tolerable & not taking medication to relieve. SCP's, [facility] survivorship materials, & writer's business card given to pt. Instructed pt navigation ended & may contact writer prn. Dr. Hubert Lee updated. Cancer Treatment Summary and  Survivorship Care Plan    Provided by Colby Pena RN on 22        This Cancer Treatment Summary is a brief record of your cancer treatment. The forms provide a way for a cancer survivor to review and retain information about their cancer, cancer treatment, and follow-up care. The forms are also meant to provide basic information about a survivor's care to future healthcare providers. General Information   Patient name Anisha Todd    Patient ID [id number]    Phone [phone] (home)    Date of birth/Age 1955, 79 y.o. Health Care Team and Follow-Ups    Continue all standard non-cancer related health care with your Primary Care Provider. Any new, unusual and/or persistent symptoms should be brought to the attention of your provider. Primary Care Physician Landy Mckeon MD, [phone]    Coordinating Provider When / How Often?    Medical Oncology visits Dr. Franklyn Garcia [address]   [place]  Phone: [phone]  Fax: [phone]    Every 6 months for 2-3 yrs, then annually per NCCN Guidelines     Next appt:  2023   Surgeon visits Dr. Amie Vivar E El[address] Contreras, [facility] [address]  [phone]   As needed   Nurse navigator Tilford Schlatter, RN  [phone]  As needed   Lab tests Dr. Shani Peterson  [address] [facility]   [place]  Phone: [phone]  Fax: [phone]  Labs: Not indicated per NCCN guidelines but may be ordered per clinical judgement. Imaging Dr. Shani Peterson  [address] [facility]   [place]  Phone: [phone]  Fax: [phone]  Every 6 months for 2-3 yrs, then low dose CT annually per NCCN Guidelines     Next CT: Discuss with Dr. Wilmer Walters at 2/9/23 appt         Diagnoses, Treatments and Procedures  Cancer Diagnosis Information    Cancer type/location Primary malignant neoplasm of left lower lobe of lung ([facility] [address]    Histologic subtype (when relevant) Adenocarcinoma   Diagnosis date 4/6/2022   Age at diagnosis 77 y.o.    Stage at diagnosis Cancer Staging  Oropharyngeal cancer [facility])  Staging form: Pharynx - HPV-Mediated Oropharynx, AJCC 8th Edition  - Clinical stage from 10/12/2021: Stage I (cT2, cN0, cM0) - Signed by Supa Gant MD on 10/12/2021    Primary malignant neoplasm of left lower lobe of lung ([facility])  Staging form: Lung, AJCC 8th Edition  - Clinical stage from 4/6/2022: Stage IA2 (cT1b, cN0, cM0) - Signed by Miki Gutiérrez MD on 4/13/2022     Tumor markers at diagnosis N/A   Surgical procedure/location/dates Past Surgical History:   Procedure Laterality Date    ACHILLES TENDON SURGERY      about 30 years ago     COLONOSCOPY      CT NEEDLE BIOPSY LUNG PERCUTANEOUS  04/06/2022    CT NEEDLE BIOPSY LUNG PERCUTANEOUS 4/6/2022 [facility] CT SCAN    DUPUYTREN CONTRACTURE RELEASE Left 11/22/2022    LEFT SMALL FINGER  DUPUYTRENS REPAIR AND ULNAR BAND LEFT SMALL FINGER (Left)    IR PORT PLACEMENT EQUAL OR GREATER THAN 5 YEARS  07/12/2022    IR PORT PLACEMENT EQUAL OR GREATER THAN 5 YEARS 7/12/2022 Javi Kendall MD [facility] SPECIAL PROCEDURES    LOBECTOMY Left 05/23/2022    robotic video assisted thoracoscoopy, lower lobectomy with lymphnode dissection    LUNG REMOVAL, TOTAL Left 05/23/2022    XI ROBOTIC VIDEO ASSISTED THORACOSCOPY, LOWER LOBECTOMY performed by Evgeny Galdamez MD at [place] [address] Left 05/23/2022    WRIST SURGERY Left 11/22/2022    LEFT SMALL FINGER  DUPUYTRENS REPAIR AND ULNAR BAND LEFT SMALL FINGER performed by Brown Burroughs MD at [address]   Genetic/hereditary risk factor(s) or predisposing conditions Cancer-related family history includes Cancer in his father. Genetic counseling performed no   Genetic testing results Not Applicable     Treatment Summary   CHEMOTHERAPY yes   Treatment Summary   Plan name OP CARBOplatin + PEMEtrexed    Treatment goal Adjuvant   Provider Charlette Pagan MD   Treatment plan weight/height/BSA 76.2 kg (entered on 6/16/2022), 172.7 cm (entered on 6/9/2022), BSA 1.91 m2   Start date 7/19/2022   End date 9/20/2022   Summary of treatment plan medications - Based on original orders and may not reflect modifications.  CARBOplatin (PARAPLATIN) 630 mg in sodium chloride 0.9 % 250 mL chemo IVPB, 627.5 mg, IntraVENous, ONCE, 4 of 4 cycles  Administration: 630 mg (7/19/2022), 590 mg (8/9/2022), 500 mg (9/20/2022), 550 mg (8/30/2022)    PEMEtrexed (ALIMTA) 1,000 mg in sodium chloride 0.9 % 100 mL chemo infusion, 955 mg, IntraVENous, ONCE, 4 of 4 cycles  Administration: 1,000 mg (7/19/2022), 1,000 mg (8/9/2022), 1,000 mg (9/20/2022), 1,000 mg (8/30/2022)    dexamethasone (DECADRON) injection 10 mg, 12 mg, IntraVENous, ONCE, 4 of 4 cycles  Dose modification: 10 mg (original dose 12 mg, Cycle 4)  Administration: 10 mg (7/19/2022), 10 mg (8/9/2022), 10 mg (9/20/2022), 10 mg (8/30/2022)         Cumulative dose Lifetime Dose Tracking   No doses have been documented on this patient for the following tracked chemicals: DOXOrubicin, EPIrubicin, IDArubicin, DAUNOrubicin, MitoXANtrone, Bleomycin, MitoMYcin, Etoposide, Ifosfamide, Total Anthracycline, DOXOrubicin Liposomal         RADIATION no     Side Effects, Education and Resources  Potential late- and long-term effects of treatment(s)     Long-term Effects  Start during treatment and persist Late Effects  Start after treatment ends   Surgery Effects   Pain  Numbness  Lung infections Neuralgia (nerve pain)  Numbness   Chemotherapy   Peripheral chronic neuropathy (weakness, numbness, pain, often in hands/feet)  Cognitive function deficits - e.g., confusion, difficulty concentrating   Chronic fatigue  Hearing loss  Pneumonitis (inflammation of lung tissue causing shortness of breath) Cognitive impairment (problems with concentration, executive function, and/or memory)       General Psychological Long-term and Late Effects   Depression  Distress--multifactorial unpleasant experience of psychological, social and/or spiritual nature  Worry, anxiety  Fear of recurrence  Fear of pain  End-of-life concerns: Death and dying  Changes in sexual function and/or desire  Challenges with body image  Challenges with self-image  Relationship and other social role difficulties  Ceqoor-zi-jniy concerns and financial challenges        Current and ongoing toxicity and side-effects of treatment(s) received   Pain @ surgical site, tolerable. Signs and symptoms of possible recurrence     A Survivorship Care Plan is part of total cancer management and includes a timetable for surveillance and follow-up appointments. During follow-up exams, your doctor checks for any signs of cancer recurrence. You can also report any new signs or symptoms to your doctor. Just because you have certain symptoms doesn't always mean the cancer has come back. Symptoms can be due to other problems that need to be addressed. Make an appointment with your doctor if you notice any persistent signs and symptoms that worry you.     When to see a doctor:  Persistent cough  Increased shortness of breath  Chest pain   Hoarseness  Loss of appetite  Unexplained weight loss  Recurring pneumonia/bronchitis              Community Resources   See handout provided at Survivorship Visit         Survivorship Patient [address] [place] (Eli Perry. [email or web address]/SurvivorshipCenter)  Cancer. Net (www.cancer. net/survivorship)   Cancer Survivors Network (csn.cancer. org)   LIVESTRONG (www.livestrong. org)   [facility] (www.survivorship.cancer.gov/springboard)   [employer] (www.canceradvocacy. org)    [address] (www.nccn.org/patients/resources/life_after_cancer/)       Referrals/Additional Comments        This Survivorship Care Plan  was shared with the following providers. Dr. Justin Kent, Dr. Nicole Smith, Dr. Mary Browne and  [facility]    Provided by Susi Arevalo RN on 12/07/22        This Cancer Treatment Summary is a brief record of your cancer treatment. The forms provide a way for a cancer survivor to review and retain information about their cancer, cancer treatment, and follow-up care. The forms are also meant to provide basic information about a survivor's care to future healthcare providers. General Information   Patient name Teresa Hastings    Patient ID [id number]    Phone [phone] (home)    Date of birth/Age 1955, 79 y.o. Health Care Team and Follow-Ups    Continue all standard non-cancer related health care with your Primary Care Provider. Any new, unusual and/or persistent symptoms should be brought to the attention of your provider. Primary Care Physician Conor Yoo MD, [phone]    Coordinating Provider When / How Often?    Medical Oncology visits Dr. Beverly Corrales   [address] [place] [address]  Phone: [phone]  Fax: [phone]    Year 1: every 1-3 months  Year 2: every 2-6 months  Year 3-5: every 4-8 months  >5 years: annually   per NCCN Guidelines    Next appt: 2/9/2023   Radiation Oncology visits Dr. Cristobal Wilkins   [address]  Phone: [phone]  Year 1: every 1-3 months  Year 2: every 2-6 months  Year 3-5: every 4-8 months  >5 years: annually   per NCCN Guidelines    Next appt: Other physician visits Dr. Aurelio GONZALES [address]  [phone]          Dr. Danielle Ventura  [address]    [phone] (Work)   Year 1: every 1-3 months  Year 2: every 2-6 months  Year 3-5: every 4-8 months  >5 years: annually   per NCCN Guidelines    Next appt:  3/2023   Nurse navigator Sheree Fair RN  [phone] As needed   Lab tests Dr. Cherie Mart   [address] [facility] [employer]  Phone: [phone]  Fax: [phone]  Thyroid-stimulating hormone (TSH) every 6-12 months if neck radiated per NCCN Guidelines    Due:    Imaging Dr. 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    Stage at diagnosis Cancer Staging  Oropharyngeal cancer [facility])  Staging form: Pharynx - HPV-Mediated Oropharynx, AJCC 8th Edition  - Clinical stage from 10/12/2021: Stage I (cT2, cN0, cM0) - Signed by Dominga Argueta MD on 10/12/2021    Primary malignant neoplasm of left lower lobe of lung [facility])  Staging form: Lung, AJCC 8th Edition  - Clinical stage from 4/6/2022: Stage IA2 (cT1b, cN0, cM0) - Signed by Raimundo Allen MD on 4/13/2022     Tumor markers at diagnosis P16 positive Surgical procedure/location/dates Past Surgical History:   Procedure Laterality Date    ACHILLES TENDON SURGERY      about 30 years ago     COLONOSCOPY      CT NEEDLE BIOPSY LUNG PERCUTANEOUS  04/06/2022    CT NEEDLE BIOPSY LUNG PERCUTANEOUS 4/6/2022 [facility] CT SCAN    DUPUYTREN CONTRACTURE RELEASE Left 11/22/2022    LEFT SMALL FINGER  DUPUYTRENS REPAIR AND ULNAR BAND LEFT SMALL FINGER (Left)    IR PORT PLACEMENT EQUAL OR GREATER THAN 5 YEARS  07/12/2022    IR PORT PLACEMENT EQUAL OR GREATER THAN 5 YEARS 7/12/2022 Jacqueline Arreguin MD [facility] SPECIAL PROCEDURES    LOBECTOMY Left 05/23/2022    robotic video assisted thoracoscoopy, lower lobectomy with lymphnode dissection    LUNG REMOVAL, TOTAL Left 05/23/2022    XI ROBOTIC VIDEO ASSISTED THORACOSCOPY, LOWER LOBECTOMY performed by Reena Fair MD at [place] [address] Left 05/23/2022    WRIST SURGERY Left 11/22/2022    LEFT SMALL FINGER  DUPUYTRENS REPAIR AND ULNAR BAND LEFT SMALL FINGER performed by Andrey Mathews MD at [address]   Genetic/hereditary risk factor(s) or predisposing conditions Cancer-related family history includes Cancer in his father.    Genetic counseling performed no   Genetic testing results Not Applicable     Treatment Summary          RADIATION yes   Body area treated Left tonsil   Total dose delivered 7,000cGy   Total treatments 35   Dates of treatment Start Date: 11/09/2021 Stop Date: 12/30/2021     Side Effects, Education and Resources  Potential late- and long-term effects of treatment(s)   Long-term Effects  Start during treatment and persist Late Effects  Start after treatment ends   Radiation Therapy Effects (IMRT, Mediastinal RT)   Xerostomia  Dysphagia  Cervical dystonia (painful involuntary contraction of the neck muscles)  Trismus   Shoulder dysfunction  Radiation dermatitis (skin irritation)  Lymphedema   Oral infections Premature cataracts  Cardiovascular disease  Xerostomia Dysphagia   Dysarthria (slurred or slow speech)   Pulmonary fibrosis (shortness of breath, especially with exercise; dry, hacking cough)  Cervical dystonia   Trismus   Brachial plexopathy (numbness, pain, tingling, burning or weakness in shoulder, arm, hand, or wrist)  Cervical radiculopathy   Osteonecrosis (may be symptom free, pain or bone is visible in mouth)  Lymphedema   Hearing loss  Sight issues  Altered or loss of taste     General Psychological Long-term and Late Effects   Depression  Distress--multifactorial unpleasant experience of psychological, social, and/or spiritual nature  Worry, anxiety  Fear of recurrence  Pain-related concerns  End-of-life concerns: Death and dying  Changes in sexual function and/or desire  Challenges with body image (secondary to surgery, laryngectomy, radiation)  Challenges with self-image  Relationship and other social role difficulties  Hzcwwl-ge-jaub concerns and financial challenges          Current and ongoing toxicity and side-effects of treatment(s) received   Dry mouth, loss of taste, difficulty swallowing in am         Signs and symptoms of possible recurrence     A Survivorship Care Plan is part of total cancer management and includes a timetable for surveillance and follow-up appointments. During follow-up exams, your doctor checks for any signs of cancer recurrence. You can also report any new signs or symptoms to your doctor. Just because you have certain symptoms doesn't always mean the cancer has come back. Symptoms can be due to other problems that need to be addressed. Make an appointment with your doctor if you notice any persistent signs and symptoms that worry you.     When to see a doctor:  New lump or growth in your mouth or around your face or neck  Voice changes  Swallowing problems  Mouth sore that doesn't heal  Ear or jaw pain  Ongoing tiredness  Unplanned weight loss  Shortness of breath  Bone pain or break           Lifestyle/Behaviors Community Resources   See handout provided at [facility] Visit         Survivorship Patient [address] (url Gist. org/SurvivorshipCenter)  Cancer. Net (www.cancer. net/survivorship)   Cancer Survivors Network (csn.cancer. org)   LIVESTRONG (www.livestrong. org)   [facility] (www.survivorship.cancer.gov/springboard)   [employer] (www.canceradvocacy. org)    [address] (www.nccn.org/patients/resources/life_after_cancer/)       Referrals/Additional Comments        This Survivorship Care Plan  was shared with the following providers.    Dr. Vincent Winkler, Dr. Benjamin Johnson, Dr. Shalonda Carrero, Dr. Delonte Thurman           Electronically signed by Ulysses Phillips, RN on 1/5/2023 at 10:28 AM

## 2023-01-05 NOTE — PROGRESS NOTES
Esthela Gene  1/5/2023  10:06 AM      Vitals:    01/05/23 0956   BP: (!) 140/79   Pulse: 66   Resp: 16   Temp: 98 °F (36.7 °C)   SpO2: 99%    :     Pain Assessment: None - Denies Pain          Wt Readings from Last 1 Encounters:   01/05/23 172 lb 12.8 oz (78.4 kg)                Current Outpatient Medications:     hydroCHLOROthiazide (HYDRODIURIL) 12.5 MG tablet, Take 1 tablet by mouth daily, Disp: 90 tablet, Rfl: 1    simvastatin (ZOCOR) 20 MG tablet, TAKE 1 TABLET BY MOUTH AT  NIGHT, Disp: 90 tablet, Rfl: 1    flecainide (TAMBOCOR) 50 MG tablet, , Disp: , Rfl:     metoprolol tartrate (LOPRESSOR) 25 MG tablet, Take 1 tablet by mouth 2 times daily (Patient taking differently: Take 25 mg by mouth 2 times daily 1/2 tablet in am 1 tablet in pm), Disp: 60 tablet, Rfl: 3    lisinopril (PRINIVIL;ZESTRIL) 10 MG tablet, Take 1 tablet by mouth daily, Disp: 30 tablet, Rfl: 3    apixaban (ELIQUIS) 5 MG TABS tablet, Take 1 tablet by mouth 2 times daily, Disp: 120 tablet, Rfl: 4    omeprazole (PRILOSEC) 20 MG delayed release capsule, Take 20 mg by mouth daily Pt takes 10mg daily, Disp: , Rfl:     Multiple Vitamins-Minerals (THERAPEUTIC MULTIVITAMIN-MINERALS) tablet, Take 1 tablet by mouth daily, Disp: , Rfl:                FALLS RISK SCREEN  Instructions:  Assess the patient and enter the appropriate indicators that are present for fall risk identification. Total the numbers entered and assign a fall risk score from Table 2.  Reassess patient at a minimum every 12 weeks or with status change. Assessment   Date  1/5/2023     1. Mental Ability: confusion/cognitively impaired 0     2. Elimination Issues: incontinence, frequency 0       3. Ambulatory: use of assistive devices (walker, cane, off-loading devices),        attached to equipment (IV pole, oxygen) 0     4. Sensory Limitations: dizziness, vertigo, impaired vision 0     5. Age less than 65        0     6. Age 72 or greater 0     7.   Medication: diuretics, strong analgesics, hypnotics, sedatives,        antihypertensive agents 3   8. Falls:  recent history of falls within the last 3 months (not to include slipping or        tripping) 0   TOTAL 3    If score of 4 or greater was education given? No           TABLE 2   Risk Score Risk Level Plan of Care   0-3 Little or  No Risk 1. Provide assistance as indicated for ambulation activities  2. Reorient confused/cognitively impaired patient  3. Chair/bed in low position, stretcher/bed with siderails up except when performing patient care activities  5. Educate patient/family/caregiver on falls prevention  6.  Reassess in 12 weeks or with any noted change in patient condition which places them at a risk for a fall   4-6 Moderate Risk 1. Provide assistance as indicated for ambulation activities  2. Reorient confused/cognitively impaired patient  3. Chair/bed in low position, stretcher/bed with siderails up except when performing patient care activities  4. Educate patient/family/caregiver on falls prevention     7 or   Higher High Risk 1. Place patient in easily observable treatment room  2. Patient attended at all times by family member or staff  3. Provide assistance as indicated for ambulation activities  4. Reorient confused/cognitively impaired patient  5. Chair/bed in low position, stretcher/bed with siderails up except when performing patient care activities  6. Educate patient/family/caregiver on falls prevention         PLAN: Patient is seen today in follow up. States he has resumed all normal daily activities. States that he sometimes has minor soreness in throat and mouth dryness. Uses Biotene to moisturize mouth. No other concerns voiced. Dr. Sarah Skinner notified of assessment and examined patient. He will follow up in May with CT scan prior to appointment.   Lena Arrington RN

## 2023-01-06 NOTE — PROGRESS NOTES
[address]            Radiation Oncology          [address] [facility]          Rayna Clark, [address]        Raul Due: [phone]        F: [phone]       [employer] [place] [address]       Radiation Oncology   [address] [facility], [address] [facility], [place]       Raul Due: [phone]       F: [phone]       [email or web address]      Date of Service: 2023     Location:  [facility],   [address] [facility]., HostMandy Middleton   [phone]        RADIATION ONCOLOGY FOLLOW UP NOTE    Patient ID:   Mirtha Russ  : 1955   MRN: [id number]    DIAGNOSIS:  Cancer Staging  [facility]  Staging form: Pharynx - HPV-Mediated Oropharynx, AJCC 8th Edition  - Clinical stage from 10/12/2021: Stage I (cT2, cN0, cM0) - Signed by Leonardo Luque MD on 10/12/2021    Primary malignant neoplasm of left lower lobe of lung ([facility] [address]  Staging form: Lung, AJCC 8th Edition  - Clinical stage from 2022: Stage IA2 (cT1b, cN0, cM0) - Signed by Taryn Mcfadden MD on 2022      -s/p H&N RT 70Gy 21  -s/p LLL Lobectomy 22    INTERVAL HISTORY:   Mirtha Russ is a 79 y.o.. male with a history of a left tonsillar squamous cell carcinoma treated with a course of definitive radiation therapy completing approximately 1 year ago. Patient afterwards had a PET scan to show resolution of his disease and was noted to have a persistence of a left lower lobe lung nodule. Patient did have a biopsy on 2022 which came back positive for an adenocarcinoma. Given his age and lung function patient was ultimately referred for lobectomy which he had on May 23, 2022 which revealed a 2.8 cm tumor with visceral pleural extension though negative margins. Patient comes in today and is overall doing well. He denies any symptoms of chest pain, shortness of breath, or coughing.  He denies any headaches, dizziness, trouble swallowing, swelling, abdominal pain, nausea, diarrhea, weight loss, bleeding, or fatigue. He does get some occasional pain in the back of his throat when he eats certain foods and does have occasional dryness in the mouth for which she uses water. He did see Dr. Sandi Herrera in ENT in October of last year and everything was normal.  Ensures to drink he still notes some changes in taste and dryness in the mouth but otherwise he saw Dr. Sandi Herrera recently as well who noted no issues on his endoscopic exam.  He had CT scans of the chest abdomen pelvis and neck on number for thousand 22 which showed no evidence of recurrent or residual disease. MEDICATIONS:    Current Outpatient Medications:     hydroCHLOROthiazide (HYDRODIURIL) 12.5 MG tablet, Take 1 tablet by mouth daily, Disp: 90 tablet, Rfl: 1    simvastatin (ZOCOR) 20 MG tablet, TAKE 1 TABLET BY MOUTH AT  NIGHT, Disp: 90 tablet, Rfl: 1    flecainide (TAMBOCOR) 50 MG tablet, , Disp: , Rfl:     metoprolol tartrate (LOPRESSOR) 25 MG tablet, Take 1 tablet by mouth 2 times daily (Patient taking differently: Take 25 mg by mouth 2 times daily 1/2 tablet in am 1 tablet in pm), Disp: 60 tablet, Rfl: 3    lisinopril (PRINIVIL;ZESTRIL) 10 MG tablet, Take 1 tablet by mouth daily, Disp: 30 tablet, Rfl: 3    apixaban (ELIQUIS) 5 MG TABS tablet, Take 1 tablet by mouth 2 times daily, Disp: 120 tablet, Rfl: 4    omeprazole (PRILOSEC) 20 MG delayed release capsule, Take 20 mg by mouth daily Pt takes 10mg daily, Disp: , Rfl:     Multiple Vitamins-Minerals (THERAPEUTIC MULTIVITAMIN-MINERALS) tablet, Take 1 tablet by mouth daily, Disp: , Rfl:     ALLERGIES:  Allergies   Allergen Reactions    Seasonal Other (See Comments)     Runny nose scratchy throat         REVIEW OF SYSTEMS:    A full 14 point review of systems was performed and assessed and found to be negative except as noted above.       PHYSICAL EXAMINATION:    CHAPERONE: Not Required    ECO Asymptomatic    VITAL SIGNS: BP (!) 140/79 Pulse 66   Temp 98 °F (36.7 °C) (Temporal)   Resp 16   Wt 172 lb 12.8 oz (78.4 kg)   SpO2 99%   BMI 26.27 kg/m²    Wt Readings from Last 3 Encounters:   01/05/23 172 lb 12.8 oz (78.4 kg)   12/19/22 170 lb (77.1 kg)   12/12/22 170 lb (77.1 kg)     GENERAL:  General appearance is that of a well-nourished, well-developed in no apparent distress. HEENT: Normocephalic, atraumatic, EOMI, moist mucosa, no erythema. NECK:  No adenopathy or a palpable thyroid mass, trachea is midline. LYMPHATICS: No cervical, supraclavicular adenopathy. HEART:  Normal rate and regular rhythm, normal heart sounds. LUNGS:  Pulmonary effort normal. Normal breath sounds. ABDOMEN:  Soft, nontender, non distended. EXTREMITIES:  No edema. No calf tenderness. MSK:  No spinal tenderness. Normal ROM. NEUROLOGICAL: Alert and oriented. Strength and sensation intact bilaterally. No focal deficits. PSYCH: Mood normal, behavior normal.  SKIN: No erythema, no desquamation. LABS:  WBC   Date Value Ref Range Status   11/01/2022 3.8 3.5 - 11.0 k/uL Final     Segs Absolute   Date Value Ref Range Status   11/01/2022 2.40 1.8 - 7.7 k/uL Final     Hemoglobin   Date Value Ref Range Status   11/01/2022 12.5 (L) 13.5 - 17.5 g/dL Final     Platelets   Date Value Ref Range Status   11/01/2022 264 140 - 450 k/uL Final     No results found for: , CEA  No results found for: PSA    IMAGING:   3/28/22 PET Scan  Impression   NIRADS score for Neck Primary: 1: No evidence of recurrence: routine   surveillance, CECT       NIRADS score for Neck Nodes: 1: No evidence of recurrence: routine   surveillance. There continues to be focal activity in a small nodule in the right thyroid   lobe. Left lower lobe pulmonary nodule with increasing FDG avidity. This could   potentially represent a primary lung cancer or metastatic disease.   Benign   etiologies remain a consideration         11/1/22 CT Chest/Abd/Pel  Impression   Chest       Post lobectomy changes on the left. No new nodules       Abdomen and pelvis:       No definite metastatic disease     11/1/22 CT Neck  Impression   NIRADS score for Primary: 1: No evidence of recurrence: routine surveillance. Expected post-treatment changes in the neck without evidence of recurrent   disease in the primary site. PATHOLOGY:  4/6/22 LLL Biopsy  -- Diagnosis --   LUNG, LEFT LOWER LOBE, CT-GUIDED CORE NEEDLE BIOPSY:        -  ADENOCARCINOMA, CONSISTENT WITH LUNG PRIMARY. 5/23/22 LLL Lobectomy  -- Diagnosis --   LUNG, LEFT LOWER LOBE, LOBECTOMY:   -LUNG INVASIVE ACINAR ADENOCARCINOMA, POORLY DIFFERENTIATED, SIZE 2.8   CM, WITH INVASION OF THE VISCERAL PLEURA WITH EXTENSION TO THE PLEURAL   SURFACE. -MARGINS NEGATIVE.     -FIVE (5) LYMPH NODES NEGATIVE FOR CARCINOMA. ASSESSMENT AND PLAN:  Valentino Kirk is a 79 y.o. male with a Cancer Staging  Oropharyngeal cancer ([facility] [address]  Staging form: Pharynx - HPV-Mediated Oropharynx, AJCC 8th Edition  - Clinical stage from 10/12/2021: Stage I (cT2, cN0, cM0) - Signed by Dell Travis MD on 10/12/2021    Primary malignant neoplasm of left lower lobe of lung ([facility] [address]  Staging form: Lung, AJCC 8th Edition  - Clinical stage from 4/6/2022: Stage IA2 (cT1b, cN0, cM0) - Signed by Suzanne Zhang MD on 4/13/2022      Patient comes in today for a follow-up 1 year after completion of his definitive radiation therapy for his tonsillar cancer. He also was found to have a left lower lobe lung primary carcinoma for which he underwent lobectomy. Patient is doing well and has recovered from both treatments. He denies any issues with eating and is maintaining his weight. He had imaging in November which showed no evidence of recurrent residual disease. He did have an ultrasound of the thyroid which recommends a yearly follow-up. We will order a CT chest for him to do in 6 months for continued surveillance and recommend he see us afterwards.   Patient otherwise is encouraged to continue close follow-up with ENT and his dentist.  We will also order TSH testing for him to do before he sees medical oncology for his next follow-up. Patient was in agreement with my recommendations. All questions were answered to their satisfaction. Patient was advised to contact us anytime should they have any questions or concerns. Electronically signed by Bertha Masterson MD on 1/6/2023 at 10:51 AM        Medications Prescribed:   New Prescriptions    No medications on file       Orders:   Orders Placed This Encounter   Procedures    CT CHEST WO CONTRAST    TSH With Reflex Ft4       CC:  Patient Care Team:  Shell Knowles MD as PCP - General (Family Medicine)  Shell Knowles MD as PCP - [facility]     Total time spent on this case on the day of encounter is 30 minutes. This time includes combination of one or more of the following - review of necessary tests, review of pertinent medical records from the EMR, performing medically appropriate examination and evaluation, counseling and educating the patient/family/caregiver, ordering necessary medical tests, procedures etc., documenting the clinical information in the electronic medical record, care coordination, referring and communicating with other health care providers and interpretation of results independently. This note is created with the assistance of a speech recognition program.  While intending to generate a document that actually reflects the content of the visit, the document can still have some errors including those of syntax and sound a like substitutions which may escape proof reading. It such instances, actual meaning can be extrapolated by contextual diversion.

## 2023-02-01 ENCOUNTER — COMMUNITY OUTREACH (OUTPATIENT)
Dept: PRIMARY CARE CLINIC | Age: 68
End: 2023-02-01

## 2023-02-01 DIAGNOSIS — I10 ESSENTIAL HYPERTENSION: ICD-10-CM

## 2023-02-06 RX ORDER — HYDROCHLOROTHIAZIDE 12.5 MG/1
12.5 TABLET ORAL DAILY
Qty: 90 TABLET | Refills: 3 | Status: SHIPPED | OUTPATIENT
Start: 2023-02-06 | End: 2023-05-07

## 2023-02-09 ENCOUNTER — OFFICE VISIT (OUTPATIENT)
Dept: ONCOLOGY | Age: 68
End: 2023-02-09
Payer: MEDICARE

## 2023-02-09 ENCOUNTER — TELEPHONE (OUTPATIENT)
Dept: ONCOLOGY | Age: 68
End: 2023-02-09

## 2023-02-09 ENCOUNTER — HOSPITAL ENCOUNTER (OUTPATIENT)
Age: 68
Discharge: HOME | End: 2023-02-09
Payer: MEDICARE

## 2023-02-09 VITALS
WEIGHT: 176.5 LBS | TEMPERATURE: 97 F | HEART RATE: 61 BPM | BODY MASS INDEX: 26.84 KG/M2 | SYSTOLIC BLOOD PRESSURE: 133 MMHG | DIASTOLIC BLOOD PRESSURE: 80 MMHG

## 2023-02-09 DIAGNOSIS — C10.9 OROPHARYNGEAL CANCER (HCC): Primary | ICD-10-CM

## 2023-02-09 DIAGNOSIS — Z79.899 NEED FOR PROPHYLACTIC CHEMOTHERAPY: ICD-10-CM

## 2023-02-09 DIAGNOSIS — D64.9 ANEMIA, UNSPECIFIED TYPE: ICD-10-CM

## 2023-02-09 DIAGNOSIS — C34.32 PRIMARY MALIGNANT NEOPLASM OF LEFT LOWER LOBE OF LUNG (HCC): ICD-10-CM

## 2023-02-09 LAB
ABSOLUTE EOS #: 0.1 K/UL (ref 0–0.4)
ABSOLUTE LYMPH #: 1 K/UL (ref 1–4.8)
ABSOLUTE MONO #: 0.4 K/UL (ref 0.1–1.2)
ALBUMIN SERPL-MCNC: 4.3 G/DL (ref 3.5–5.2)
ALBUMIN/GLOBULIN RATIO: 1.7 (ref 1–2.5)
ALP SERPL-CCNC: 75 U/L (ref 40–129)
ALT SERPL-CCNC: 15 U/L (ref 5–41)
ANION GAP SERPL CALCULATED.3IONS-SCNC: 7 MMOL/L (ref 9–17)
AST SERPL-CCNC: 18 U/L
BASOPHILS # BLD: 0 % (ref 0–2)
BASOPHILS ABSOLUTE: 0 K/UL (ref 0–0.2)
BILIRUB SERPL-MCNC: 0.6 MG/DL (ref 0.3–1.2)
BUN SERPL-MCNC: 16 MG/DL (ref 8–23)
CALCIUM SERPL-MCNC: 9.4 MG/DL (ref 8.6–10.4)
CHLORIDE SERPL-SCNC: 101 MMOL/L (ref 98–107)
CO2 SERPL-SCNC: 28 MMOL/L (ref 20–31)
CREAT SERPL-MCNC: 0.86 MG/DL (ref 0.7–1.2)
EOSINOPHILS RELATIVE PERCENT: 3 % (ref 1–4)
GFR SERPL CREATININE-BSD FRML MDRD: >60 ML/MIN/1.73M2
GLUCOSE SERPL-MCNC: 112 MG/DL (ref 70–99)
HCT VFR BLD AUTO: 40.4 % (ref 41–53)
HGB BLD-MCNC: 13.7 G/DL (ref 13.5–17.5)
LYMPHOCYTES # BLD: 30 % (ref 24–44)
MCH RBC QN AUTO: 29.6 PG (ref 26–34)
MCHC RBC AUTO-ENTMCNC: 34 G/DL (ref 31–37)
MCV RBC AUTO: 87.1 FL (ref 80–100)
MONOCYTES # BLD: 12 % (ref 2–11)
PDW BLD-RTO: 13.6 % (ref 12.5–15.4)
PLATELET # BLD AUTO: 274 K/UL (ref 140–450)
PMV BLD AUTO: 7.3 FL (ref 6–12)
POTASSIUM SERPL-SCNC: 4.2 MMOL/L (ref 3.7–5.3)
PROT SERPL-MCNC: 6.8 G/DL (ref 6.4–8.3)
RBC # BLD: 4.64 M/UL (ref 4.5–5.9)
SEG NEUTROPHILS: 55 % (ref 36–66)
SEGMENTED NEUTROPHILS ABSOLUTE COUNT: 1.9 K/UL (ref 1.8–7.7)
SODIUM SERPL-SCNC: 136 MMOL/L (ref 135–144)
TSH SERPL-ACNC: 1.83 UIU/ML (ref 0.3–5)
WBC # BLD AUTO: 3.4 K/UL (ref 3.5–11)

## 2023-02-09 PROCEDURE — 99211 OFF/OP EST MAY X REQ PHY/QHP: CPT | Performed by: INTERNAL MEDICINE

## 2023-02-09 PROCEDURE — G8484 FLU IMMUNIZE NO ADMIN: HCPCS | Performed by: INTERNAL MEDICINE

## 2023-02-09 PROCEDURE — G8417 CALC BMI ABV UP PARAM F/U: HCPCS | Performed by: INTERNAL MEDICINE

## 2023-02-09 PROCEDURE — 99214 OFFICE O/P EST MOD 30 MIN: CPT | Performed by: INTERNAL MEDICINE

## 2023-02-09 PROCEDURE — 3075F SYST BP GE 130 - 139MM HG: CPT | Performed by: INTERNAL MEDICINE

## 2023-02-09 PROCEDURE — 84443 ASSAY THYROID STIM HORMONE: CPT

## 2023-02-09 PROCEDURE — 1036F TOBACCO NON-USER: CPT | Performed by: INTERNAL MEDICINE

## 2023-02-09 PROCEDURE — 85025 COMPLETE CBC W/AUTO DIFF WBC: CPT

## 2023-02-09 PROCEDURE — 3079F DIAST BP 80-89 MM HG: CPT | Performed by: INTERNAL MEDICINE

## 2023-02-09 PROCEDURE — G8427 DOCREV CUR MEDS BY ELIG CLIN: HCPCS | Performed by: INTERNAL MEDICINE

## 2023-02-09 PROCEDURE — 36415 COLL VENOUS BLD VENIPUNCTURE: CPT

## 2023-02-09 PROCEDURE — 3017F COLORECTAL CA SCREEN DOC REV: CPT | Performed by: INTERNAL MEDICINE

## 2023-02-09 PROCEDURE — 80053 COMPREHEN METABOLIC PANEL: CPT

## 2023-02-09 PROCEDURE — 1123F ACP DISCUSS/DSCN MKR DOCD: CPT | Performed by: INTERNAL MEDICINE

## 2023-02-09 NOTE — TELEPHONE ENCOUNTER
AVS from 2/9/23    RTc C in 3 months Ct chest (already scheduled) and lab a week prior    *rv is sched for [email or web address]  *ct sched for 5/8/23    PT was given AVS and appt schedule

## 2023-02-09 NOTE — PROGRESS NOTES
Patient ID: Sienna Lim, 1955, [id number], 79 y.o. Referred by : Dianne Lehman MD  Diagnosis:   Oropharyngeal carcin shirley, left tonsil squamous cell carcinoma, p16 positive, clinical stage T2 a N0 M0, stage I diseasePatient has been evaluated by ENT surgical oncology at [facility] and as per the tumor board recommendation definitive radiation therapy was recommended   Patient started on definitive radiation therapy on 11/9/2021   He  completed RT on December 29, 2021     Cancer Staging  Oropharyngeal cancer [facility])  Staging form: Pharynx - HPV-Mediated Oropharynx, AJCC 8th Edition  - Clinical stage from 10/12/2021: Stage I (cT2, cN0, cM0) - Signed by Sav Jacobsen MD on 10/12/2021    Primary malignant neoplasm of left lower lobe of lung ([facility] [address]  Staging form: Lung, AJCC 8th Edition  - Clinical stage from 4/6/2022: Stage IA2 (cT1b, cN0, cM0) - Signed by Sonal Tobin MD on 4/13/2022    4. On 3/28/2022 had a PET scan which showed resolution of uptake in the tonsil and no active lymph node in the cervical region however 1.9 cm left lower lobe nodule noted with FDG activity. Patient had CT-guided biopsy on 4/6/2022 which showed adenocarcinoma  5. Patient underwent Left lower lobectomy on 5/23/2022. Final surgical pathology showed T2 a N0 M0, with invasion of the visceral pleura with extension to the pleural surface  6. 7/19/22  started on adjuvant chemo with Amy Alimta  Cycle #4  given on 9/20/22    HISTORY OF PRESENT ILLNESS:    Oncologic History:  Sienna Lim is 79 y.o. male was seen during initial consultation with for newly diagnosed left tonsillar/oropharyngeal carcinoma. Patient initially presented with sore throat/throat pain in May of this year and was referred to ENT for further evaluation. His ENT evaluation did show 3 cm left tonsillar mass limited to tonsils only.   Patient had a CT of the neck on 9/22/2021 which showed 3.2 cm mass in the left palatine tonsil without any abnormal lymphadenopathy. Patient was evaluated by ENT and had a biopsy of the left tonsil which showed poorly differentiated invasive squamous cell carcinoma, p16 positive. Subsequently patient had a PET scan on 10/6/2021 which showed no distant metastasis and no lymph node metastasis noted. Patient has been evaluated by radiation oncology  He denies any unintentional weight loss, drenching night sweats fever chills. INTERVAL HISTORY  Patient is returning for follow-up visit and to discuss lab results and further recommendations. He denies any abdominal pain nausea vomiting. At times he has mild difficulty in swallowing but he tells it is getting better   He denies any chest pain shortness of breath. Denies any unintentional weight loss, drenching night sweats fever chills. He is WBC is slightly low and has mild anemia. During this visit patient's allergy, social, medical, surgical history and medications were reviewed and updated. Past Medical History:   Diagnosis Date    COVID-19     home test, mild URI sx's, no hospitalization    Health care maintenance     PCP- Lotus Warner-  seen  6/2021    History of atrial fibrillation     follows with TCC, on eliquis    Hyperlipidemia     Hypertension     Lung cancer ([address]     Tonsillar cancer ([address] 09/2021    Tonsilar cancer. ENT -Dr Iesha Ward ,       Past Surgical History:   Procedure Laterality Date    ACHILLES TENDON SURGERY      about 30 years ago     COLONOSCOPY      CT NEEDLE BIOPSY LUNG PERCUTANEOUS  04/06/2022    CT NEEDLE BIOPSY LUNG PERCUTANEOUS 4/6/2022 [facility] CT SCAN    DUPUYTREN CONTRACTURE RELEASE Left 11/22/2022    LEFT SMALL FINGER  DUPUYTRENS REPAIR AND ULNAR BAND LEFT SMALL FINGER (Left)    IR PORT PLACEMENT EQUAL OR GREATER THAN 5 YEARS  07/12/2022    IR PORT PLACEMENT EQUAL OR GREATER THAN 5 YEARS 7/12/2022 Sabrina Wolfe MD [facility]    LOBECTOMY Left 05/23/2022    robotic video assisted thoracoscoopy, lower lobectomy with lymphnode dissection    LUNG REMOVAL, TOTAL Left 2022    XI ROBOTIC VIDEO ASSISTED THORACOSCOPY, LOWER LOBECTOMY performed by Tyree Rendon MD at [place] [id number] Left 2022    WRIST SURGERY Left 2022    LEFT SMALL FINGER  DUPUYTRENS REPAIR AND ULNAR BAND LEFT SMALL FINGER performed by Tristian Mars MD at [facility] OR       Allergies   Allergen Reactions    Seasonal Other (See Comments)     Runny nose scratchy throat       Current Outpatient Medications   Medication Sig Dispense Refill    hydroCHLOROthiazide (HYDRODIURIL) 12.5 MG tablet TAKE 1 TABLET BY MOUTH DAILY 90 tablet 3    simvastatin (ZOCOR) 20 MG tablet TAKE 1 TABLET BY MOUTH AT  NIGHT 90 tablet 1    flecainide (TAMBOCOR) 50 MG tablet  (Patient not taking: Reported on 2023)      metoprolol tartrate (LOPRESSOR) 25 MG tablet Take 1 tablet by mouth 2 times daily (Patient taking differently: Take 25 mg by mouth 2 times daily 1/2 tablet in am 1 tablet in pm) 60 tablet 3    lisinopril (PRINIVIL;ZESTRIL) 10 MG tablet Take 1 tablet by mouth daily 30 tablet 3    apixaban (ELIQUIS) 5 MG TABS tablet Take 1 tablet by mouth 2 times daily 120 tablet 4    omeprazole (PRILOSEC) 20 MG delayed release capsule Take 20 mg by mouth daily Pt takes 10mg daily      Multiple Vitamins-Minerals (THERAPEUTIC MULTIVITAMIN-MINERALS) tablet Take 1 tablet by mouth daily       No current facility-administered medications for this visit.        Social History     Socioeconomic History    Marital status:      Spouse name: Not on file    Number of children: Not on file    Years of education: Not on file    Highest education level: Not on file   Occupational History    Not on file   Tobacco Use    Smoking status: Former     Types: Cigarettes     Quit date: 3/30/2022     Years since quittin.8    Smokeless tobacco: Never    Tobacco comments:     states quit smoking \"40 yrs ago\"   Vaping Use    Vaping Use: Never used   Substance and Sexual Activity    Alcohol use: Yes     Comment: socially     Drug use: Not Currently    Sexual activity: Yes     Partners: Female   Other Topics Concern    Not on file   Social History Narrative    Not on file     Social Determinants of Health     Financial Resource Strain: Low Risk     Difficulty of Paying Living Expenses: Not very hard   Food Insecurity: No Food Insecurity    Worried About Running Out of Food in the Last Year: Never true    Ran Out of Food in the Last Year: Never true   Transportation Needs: Not on file   Physical Activity: Not on file   Stress: Not on file   Social Connections: Not on file   Intimate Partner Violence: Not on file   Housing Stability: Not on file       Family History   Problem Relation Age of Onset    Coronary Art Dis Mother     Cancer Father         REVIEW OF SYSTEM:     Constitutional: No fever or chills. No night sweats, no weight loss   Eyes: No eye discharge, double vision, or eye pain   HEENT: negative for sore mouth, sore throat, hoarseness and voice change   Respiratory: negative for cough , sputum, dyspnea, wheezing, hemoptysis, chest pain   Cardiovascular: negative for chest pain, dyspnea, palpitations, orthopnea, PND   Gastrointestinal: negative for nausea, vomiting, diarrhea, constipation, abdominal pain, Dysphagia, hematemesis and hematochezia   Genitourinary: negative for frequency, dysuria, nocturia, urinary incontinence, and hematuria   Integument: negative for rash, skin lesions, bruises. Hematologic/Lymphatic: negative for easy bruising, bleeding, lymphadenopathy, petechiae and swelling/edema   Endocrine: negative for heat or cold intolerance, tremor, weight changes, change in bowel habits and hair loss   Musculoskeletal: negative for myalgias, arthralgias, pain, joint swelling,and bone pain   Neurological: negative for headaches, dizziness, seizures, weakness, numbness       OBJECTIVE:         There were no vitals filed for this visit.         PHYSICAL EXAM: General appearance - well appearing, no in pain or distress   Mental status - alert and cooperative   Eyes - pupils equal and reactive, extraocular eye movements intact   Ears - bilateral TM's and external ear canals normal   Mouth - mucous membranes moist, pharynx normal without lesions   Neck - supple, no significant adenopathy   Lymphatics - no palpable lymphadenopathy, no hepatosplenomegaly   Chest - clear to auscultation, no wheezes, rales or rhonchi, symmetric air entry   Heart - normal rate, regular rhythm, normal S1, S2, no murmurs, rubs, clicks or gallops   Abdomen - soft, nontender, nondistended, no masses or organomegaly   Neurological - alert, oriented, normal speech, no focal findings or movement disorder noted   Musculoskeletal - no joint tenderness, deformity or swelling   Extremities - peripheral pulses normal, no pedal edema, no clubbing or cyanosis   Skin - normal coloration and turgor, no rashes, no suspicious skin lesions noted ,      LABORATORY DATA:     Lab Results   Component Value Date    WBC 3.8 11/01/2022    HGB 12.5 (L) 11/01/2022    HCT 35.9 (L) 11/01/2022    MCV 92.9 11/01/2022     11/01/2022    LYMPHOPCT 22 (L) 11/01/2022    RBC 3.87 (L) 11/01/2022    MCH 32.2 11/01/2022    MCHC 34.7 11/01/2022    RDW 18.0 (H) 11/01/2022    MONOPCT 12 (H) 11/01/2022    BASOPCT 1 11/01/2022    NEUTROABS 2.40 11/01/2022    LYMPHSABS 0.80 (L) 11/01/2022    MONOSABS 0.50 11/01/2022    EOSABS 0.10 11/01/2022    BASOSABS 0.00 11/01/2022         Chemistry        Component Value Date/Time     11/01/2022 0834    K 4.1 11/01/2022 0834     11/01/2022 0834    CO2 26 11/01/2022 0834    BUN 12 11/01/2022 0834    CREATININE 0.89 11/01/2022 0834        Component Value Date/Time    CALCIUM 9.4 11/01/2022 0834    ALKPHOS 74 11/01/2022 0834    AST 19 11/01/2022 0834    ALT 15 11/01/2022 0834    BILITOT 0.7 11/01/2022 0834        PATHOLOGY DATA:   10/1/2021  8:43 AM - Dangelo, August Incoming Lab Results From [facility]    Component Collected Lab   Surgical Pathology Report 09/27/2021 10:13 AM [facility]   -- Diagnosis --   LEFT TONSIL, BIOPSIES:             -  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 Surgical Pathology Report  SURGICAL PATHOLOGY REPORT  Collected: 04/06/22 1230   Lab status: Final   Resulting lab: [facility]   Value: -- Diagnosis --   LUNG, LEFT LOWER LOBE, CT-GUIDED CORE NEEDLE BIOPSY:        -  ADENOCARCINOMA, CONSISTENT WITH LUNG PRIMARY. Surgical Pathology Report  SURGICAL PATHOLOGY REPORT  Collected: 05/23/22 1235   Lab status: Final   Resulting lab: [facility]   Value: -- Diagnosis --   LUNG, LEFT LOWER LOBE, LOBECTOMY:   -LUNG INVASIVE ACINAR ADENOCARCINOMA, POORLY DIFFERENTIATED, SIZE 2.8   CM, WITH INVASION OF THE VISCERAL PLEURA WITH EXTENSION TO THE PLEURAL   SURFACE. -MARGINS NEGATIVE.     -FIVE (5) LYMPH NODES NEGATIVE FOR CARCINOMA. DISTANT SITE(S) INVOLVED, IF APPLICABLE: N/A. PATHOLOGIC STAGE CLASSIFICATION:     pT2a  pN0     IMAGING DATA:    PET CT SKULL BASE TO MID THIGH  Narrative: EXAMINATION:  WHOLE BODY PET/CT  10/6/2021    TECHNIQUE:  Following IV injection of 11.4 mCi of F 18 -FDG, PET  tumor imaging was  acquired from the base of the skull to the mid thighs. Computed tomography  was used for purposes of attenuation correction and anatomic localization. Fusion imaging was utilized for interpretation. Uptake time 59 mins. Glucose level 105 mg/dl. COMPARISON:  CT neck 09/22/2021    HISTORY:  Reason for Exam: Malignant neoplasm of tonsillar fossa  Acuity: Acute  Type of Exam: Initial    FINDINGS:  HEAD/NECK: Redemonstration of left palatine tonsillar lesion which is FDG  avid with max SUV of 12.0. No metabolically active cervical lymphadenopathy.   8 mm posterior right thyroid hypodense nodule which is FDG avid with max SUV  of 6.0. CHEST: Left retrocardiac lower lobe nodular area of opacification measuring 2  cm which demonstrates faint uptake with max SUV of 2.3. No metabolically  active axillary, hilar, or mediastinal lymphadenopathy. ABDOMEN/PELVIS: No metabolically active intraperitoneal mass. No  metabolically active abdominal or pelvic lymphadenopathy. Physiologic  activity in the gastrointestinal and genitourinary systems. BONES/SOFT TISSUE: No abnormal FDG activity localizes to the bones. No  aggressive osseous lesion. INCIDENTAL CT FINDINGS: Multivessel coronary artery calcification. Aortic  atherosclerosis most pronounced in the infrarenal segment. Sigmoid  diverticulosis. Impression: 1. Redemonstration of left palatine tonsillar lesion which is FDG avid  consistent with history of malignancy. 2. Posterior right thyroid hypodense nodule which is FDG avid, recommend  further evaluation with dedicated ultrasound. 3. Left retrocardiac lower lobe nodular area of opacification as measured  above which demonstrates faint uptake and is favored to represent possible  pneumonia or other infectious/inflammatory process though  metastatic/neoplastic etiology not definitively excluded. Recommend post  treatment chest CT in 6-8 weeks to reassess. If persistent at time of  follow-up then further evaluation can be obtained with tissue sampling. PET 3/38/22  NIRADS score for Neck Primary: 1: No evidence of recurrence: routine   surveillance, CECT       NIRADS score for Neck Nodes: 1: No evidence of recurrence: routine   surveillance. There continues to be focal activity in a small nodule in the right thyroid   lobe. Left lower lobe pulmonary nodule with increasing FDG avidity. This could   potentially represent a primary lung cancer or metastatic disease.   Benign   etiologies remain a consideration       ASSESSMENT:    Pilar Reyes is a 79 y.o. pleasant male with oropharyngeal cancer and now with left lower lobe adenocarcinoma    Oropharyngeal cancer: Diagnosed oropharyngeal cancer, p16 positive, clinical stage T2 a N0 M0, stage I disease. Patient completed definitive radiation therapy and now was on surveillance   I reviewed the NCCN guidelines and goals of care with patient. Left lower lobe lung adenocarcinoma. Patient underwent left lower lobectomy on 5/23/2022 and final pathology showed T2AN0 disease with visceral pleural invasion and now currently on adjuvant systemic chemotherapy and completed  cycle #4    Thyroid nodule: We will follow with ultrasound of the thyroid  Mild anemia  Mild leukopenia  During today's visit, the patient and the family had a number of reasonable questions which were answered to their satisfaction. They verbalized understanding of the information provided and they agreed to proceed as outlined above. PLAN:   I reviewed the results of recent lab work, discussed diagnosis and treatment recommendations   His WBC is mildly low   We will get CT chest in 3 months   I will get CBC iron and B12 studies in 3 months   Return to clinic in 3 months with labs and scans prior         Charles Bowles MD  Hematologist/Medical Oncologist    On this date 2/9/23  I have spent 40 minutes reviewing previous notes, test results and face to face with the patient discussing the diagnosis and importance of compliance with the treatment plan. Greater than 50% of    was spent face-to-face with the patient in counseling and coordinating her care. This note is created with the assistance of a speech recognition program.  While intending to generate a document that actually reflects the content of the visit, the document can still have some errors including those of syntax and sound a like substitutions which may escape proof reading. It such instances, actual meaning can be extrapolated by contextual diversion.     Felton Rice MD

## 2023-03-10 ENCOUNTER — TELEPHONE (OUTPATIENT)
Dept: RADIATION ONCOLOGY | Age: 68
End: 2023-03-10

## 2023-03-10 NOTE — TELEPHONE ENCOUNTER
Patient requesting a letter for jury duty due to his medical condition incase he is selected and would have to be on trial he has upcoming CT's and doctor appointments. Patient asking to be exempted from jury duty with a letter written on letterhead. Please advise.

## 2023-05-04 DIAGNOSIS — E78.2 HYPERLIPIDEMIA, MIXED: ICD-10-CM

## 2023-05-04 RX ORDER — SIMVASTATIN 20 MG
TABLET ORAL
Qty: 90 TABLET | Refills: 3 | Status: SHIPPED | OUTPATIENT
Start: 2023-05-04

## 2023-05-08 ENCOUNTER — HOSPITAL ENCOUNTER (OUTPATIENT)
Dept: CT IMAGING | Age: 68
Discharge: HOME | End: 2023-05-10
Payer: MEDICARE

## 2023-05-08 DIAGNOSIS — C34.32 PRIMARY MALIGNANT NEOPLASM OF LEFT LOWER LOBE OF LUNG (HCC): ICD-10-CM

## 2023-05-08 DIAGNOSIS — C10.9 OROPHARYNGEAL CANCER (HCC): ICD-10-CM

## 2023-05-08 PROCEDURE — 71250 CT THORAX DX C-: CPT

## 2023-05-11 ENCOUNTER — HOSPITAL ENCOUNTER (OUTPATIENT)
Age: 68
Discharge: HOME | End: 2023-05-11
Payer: MEDICARE

## 2023-05-11 DIAGNOSIS — D64.9 ANEMIA, UNSPECIFIED TYPE: ICD-10-CM

## 2023-05-11 LAB
ABSOLUTE EOS #: 0.1 K/UL (ref 0–0.4)
ABSOLUTE LYMPH #: 1.1 K/UL (ref 1–4.8)
ABSOLUTE MONO #: 0.4 K/UL (ref 0.1–1.2)
ABSOLUTE RETIC #: 0.12 M/UL (ref 0.03–0.08)
ALBUMIN SERPL-MCNC: 4.6 G/DL (ref 3.5–5.2)
ALBUMIN/GLOBULIN RATIO: 1.6 (ref 1–2.5)
ALP SERPL-CCNC: 65 U/L (ref 40–129)
ALT SERPL-CCNC: 16 U/L (ref 5–41)
ANION GAP SERPL CALCULATED.3IONS-SCNC: 12 MMOL/L (ref 9–17)
AST SERPL-CCNC: 22 U/L
BASOPHILS # BLD: 1 % (ref 0–2)
BASOPHILS ABSOLUTE: 0 K/UL (ref 0–0.2)
BILIRUB SERPL-MCNC: 1 MG/DL (ref 0.3–1.2)
BUN SERPL-MCNC: 14 MG/DL (ref 8–23)
CALCIUM SERPL-MCNC: 10 MG/DL (ref 8.6–10.4)
CHLORIDE SERPL-SCNC: 100 MMOL/L (ref 98–107)
CO2 SERPL-SCNC: 26 MMOL/L (ref 20–31)
CREAT SERPL-MCNC: 0.96 MG/DL (ref 0.7–1.2)
EOSINOPHILS RELATIVE PERCENT: 2 % (ref 1–4)
FERRITIN SERPL-MCNC: 142 NG/ML (ref 30–400)
FOLATE SERPL-MCNC: >20 NG/ML
GFR SERPL CREATININE-BSD FRML MDRD: >60 ML/MIN/1.73M2
GLUCOSE SERPL-MCNC: 120 MG/DL (ref 70–99)
HCT VFR BLD AUTO: 43.8 % (ref 41–53)
HGB BLD-MCNC: 14.8 G/DL (ref 13.5–17.5)
IMMATURE RETIC FRACT: 12.5 % (ref 2.7–18.3)
IRON SATURATION: 25 % (ref 20–55)
IRON SERPL-MCNC: 101 UG/DL (ref 59–158)
LYMPHOCYTES # BLD: 25 % (ref 24–44)
MCH RBC QN AUTO: 30.2 PG (ref 26–34)
MCHC RBC AUTO-ENTMCNC: 33.8 G/DL (ref 31–37)
MCV RBC AUTO: 89.3 FL (ref 80–100)
MONOCYTES # BLD: 10 % (ref 2–11)
PDW BLD-RTO: 14.7 % (ref 12.5–15.4)
PLATELET # BLD AUTO: 291 K/UL (ref 140–450)
PMV BLD AUTO: 7.7 FL (ref 6–12)
POTASSIUM SERPL-SCNC: 4.2 MMOL/L (ref 3.7–5.3)
PROT SERPL-MCNC: 7.4 G/DL (ref 6.4–8.3)
RBC # BLD: 4.9 M/UL (ref 4.5–5.9)
RETIC HEMOGLOBIN: 34.5 PG (ref 28.2–35.7)
RETICS/RBC NFR AUTO: 2.4 % (ref 0.5–1.9)
SEG NEUTROPHILS: 62 % (ref 36–66)
SEGMENTED NEUTROPHILS ABSOLUTE COUNT: 2.7 K/UL (ref 1.8–7.7)
SODIUM SERPL-SCNC: 138 MMOL/L (ref 135–144)
TIBC SERPL-MCNC: 407 UG/DL (ref 250–450)
UNSATURATED IRON BINDING CAPACITY: 306 UG/DL (ref 112–347)
VIT B12 SERPL-MCNC: 816 PG/ML (ref 232–1245)
WBC # BLD AUTO: 4.3 K/UL (ref 3.5–11)

## 2023-05-11 PROCEDURE — 85025 COMPLETE CBC W/AUTO DIFF WBC: CPT

## 2023-05-11 PROCEDURE — 83550 IRON BINDING TEST: CPT

## 2023-05-11 PROCEDURE — 82607 VITAMIN B-12: CPT

## 2023-05-11 PROCEDURE — 82728 ASSAY OF FERRITIN: CPT

## 2023-05-11 PROCEDURE — 36415 COLL VENOUS BLD VENIPUNCTURE: CPT

## 2023-05-11 PROCEDURE — 83540 ASSAY OF IRON: CPT

## 2023-05-11 PROCEDURE — 85045 AUTOMATED RETICULOCYTE COUNT: CPT

## 2023-05-11 PROCEDURE — 82746 ASSAY OF FOLIC ACID SERUM: CPT

## 2023-05-11 PROCEDURE — 80053 COMPREHEN METABOLIC PANEL: CPT

## 2023-05-12 LAB — SURGICAL PATHOLOGY REPORT: NORMAL

## 2023-05-13 LAB — PATH REV BLD -IMP: NORMAL

## 2023-05-15 ENCOUNTER — HOSPITAL ENCOUNTER (OUTPATIENT)
Dept: RADIATION ONCOLOGY | Age: 68
Discharge: HOME | End: 2023-05-15
Attending: RADIOLOGY
Payer: MEDICARE

## 2023-05-15 VITALS
SYSTOLIC BLOOD PRESSURE: 159 MMHG | BODY MASS INDEX: 26.88 KG/M2 | WEIGHT: 176.8 LBS | TEMPERATURE: 97 F | HEART RATE: 65 BPM | RESPIRATION RATE: 14 BRPM | DIASTOLIC BLOOD PRESSURE: 84 MMHG

## 2023-05-15 DIAGNOSIS — C34.32 PRIMARY MALIGNANT NEOPLASM OF LEFT LOWER LOBE OF LUNG (HCC): ICD-10-CM

## 2023-05-15 DIAGNOSIS — R91.1 LUNG NODULE: Primary | ICD-10-CM

## 2023-05-15 DIAGNOSIS — C10.9 OROPHARYNGEAL CANCER (HCC): ICD-10-CM

## 2023-05-15 PROCEDURE — 99212 OFFICE O/P EST SF 10 MIN: CPT | Performed by: RADIOLOGY

## 2023-05-15 ASSESSMENT — PAIN SCALES - GENERAL: PAINLEVEL_OUTOF10: 0

## 2023-05-15 NOTE — PROGRESS NOTES
[facility]  5/15/2023  9:28 AM      Vitals:    05/15/23 0900   BP: (!) 159/84   Pulse: 65   Resp: 14   Temp: 97 °F (36.1 °C)    : Pain Assessment: 0-10  Pain Level: 0       Wt Readings from Last 1 Encounters:   05/15/23 176 lb 12.8 oz (80.2 kg)                Current Outpatient Medications:     simvastatin (ZOCOR) 20 MG tablet, TAKE 1 TABLET BY MOUTH AT NIGHT, Disp: 90 tablet, Rfl: 3    hydroCHLOROthiazide (HYDRODIURIL) 12.5 MG tablet, TAKE 1 TABLET BY MOUTH DAILY, Disp: 90 tablet, Rfl: 3    flecainide (TAMBOCOR) 50 MG tablet, , Disp: , Rfl:     metoprolol tartrate (LOPRESSOR) 25 MG tablet, Take 1 tablet by mouth 2 times daily (Patient not taking: Reported on 5/15/2023), Disp: 60 tablet, Rfl: 3    lisinopril (PRINIVIL;ZESTRIL) 10 MG tablet, Take 1 tablet by mouth daily (Patient taking differently: Take 20 mg by mouth daily), Disp: 30 tablet, Rfl: 3    apixaban (ELIQUIS) 5 MG TABS tablet, Take 1 tablet by mouth 2 times daily, Disp: 120 tablet, Rfl: 4    omeprazole (PRILOSEC) 20 MG delayed release capsule, Take 20 mg by mouth daily Pt takes 10mg daily, Disp: , Rfl:     Multiple Vitamins-Minerals (THERAPEUTIC MULTIVITAMIN-MINERALS) tablet, Take 1 tablet by mouth daily, Disp: , Rfl:                FALLS RISK SCREEN  Instructions:  Assess the patient and enter the appropriate indicators that are present for fall risk identification. Total the numbers entered and assign a fall risk score from Table 2.  Reassess patient at a minimum every 12 weeks or with status change. Assessment   Date  5/15/2023     1. Mental Ability: confusion/cognitively impaired 0     2. Elimination Issues: incontinence, frequency 0       3. Ambulatory: use of assistive devices (walker, cane, off-loading devices),        attached to equipment (IV pole, oxygen) 0     4. Sensory Limitations: dizziness, vertigo, impaired vision 0     5. Age less than 65        0     6. Age 72 or greater 1     7.   Medication: diuretics, strong

## 2023-05-16 NOTE — PROGRESS NOTES
[address]            Radiation Oncology          [address] [facility]          [address]        Grazyna Garzon: [phone]        F: [phone]       [facility] [address] [place] [address]       Radiation Oncology   [address] [facility], [address] [facility]       Grazyna Garzon: [phone]       F: [phone]       [email or web address]      Date of Service: 5/15/2023     Location:  [address] [place] [address],   [address] [facility]., [place] Mccann   [phone]        RADIATION ONCOLOGY FOLLOW UP NOTE    Patient ID:   Zachary Samayoa  : 1955   MRN: [id number]    DIAGNOSIS:   Cancer Staging   Oropharyngeal cancer ([facility] [address]  Staging form: Pharynx - HPV-Mediated Oropharynx, AJCC 8th Edition  - Clinical stage from 10/12/2021: Stage I (cT2, cN0, cM0) - Signed by Ramses Quintero MD on 10/12/2021    Primary malignant neoplasm of left lower lobe of lung ([facility] [address]  Staging form: Lung, AJCC 8th Edition  - Clinical stage from 2022: Stage IA2 (cT1b, cN0, cM0) - Signed by Josh Garcia MD on 2022        -s/p H&N RT 70Gy 21  -s/p LLL Lobectomy 22    INTERVAL HISTORY:   Zachary Samayoa is a 76 y.o.. male with a history of a left tonsillar squamous cell carcinoma treated with a course of definitive radiation therapy completing approximately 1 year ago. Patient afterwards had a PET scan to show resolution of his disease and was noted to have a persistence of a left lower lobe lung nodule. Patient did have a biopsy on 2022 which came back positive for an adenocarcinoma. Given his age and lung function patient was ultimately referred for lobectomy which he had on May 23, 2022 which revealed a 2.8 cm tumor with visceral pleural extension though negative margins. Patient comes in today and is overall doing well. He denies any symptoms of chest pain, shortness of breath, or coughing.  He denies any headaches, dizziness, trouble swallowing,

## 2023-06-02 ENCOUNTER — PATIENT MESSAGE (OUTPATIENT)
Dept: PRIMARY CARE CLINIC | Age: 68
End: 2023-06-02

## 2023-06-02 NOTE — TELEPHONE ENCOUNTER
From: Pj Almeida  To: Dr. Alexei Sawyer: 6/2/2023 11:38 AM EDT  Subject: RX Refill    Could I get a refill on Lisinopril, 20MG? Currently get from [facility] in 3 month increments. Thank you.     Samy Alfonso

## 2023-06-05 RX ORDER — LISINOPRIL 10 MG/1
20 TABLET ORAL DAILY
Qty: 90 TABLET | Refills: 0 | Status: SHIPPED | OUTPATIENT
Start: 2023-06-05

## 2023-06-19 RX ORDER — LISINOPRIL 10 MG/1
20 TABLET ORAL DAILY
Qty: 180 TABLET | Refills: 0 | Status: SHIPPED | OUTPATIENT
Start: 2023-06-19

## 2023-06-19 NOTE — TELEPHONE ENCOUNTER
Abbey Newton is requesting a refill on the following medication(s):  Requested Prescriptions     Pending Prescriptions Disp Refills    lisinopril (PRINIVIL;ZESTRIL) 10 MG tablet [Pharmacy Med Name: Lisinopril 10 MG Oral Tablet] 180 tablet 0     Sig: TAKE 2 TABLETS BY MOUTH DAILY       Last Visit Date (If Applicable):  84/75/8592    Next Visit Date:    8/8/2023

## 2023-06-20 ENCOUNTER — TELEPHONE (OUTPATIENT)
Dept: ONCOLOGY | Age: 68
End: 2023-06-20

## 2023-06-20 ENCOUNTER — OFFICE VISIT (OUTPATIENT)
Dept: ONCOLOGY | Age: 68
End: 2023-06-20
Payer: MEDICARE

## 2023-06-20 VITALS
TEMPERATURE: 97.7 F | DIASTOLIC BLOOD PRESSURE: 83 MMHG | WEIGHT: 174.8 LBS | SYSTOLIC BLOOD PRESSURE: 123 MMHG | HEART RATE: 72 BPM | BODY MASS INDEX: 26.58 KG/M2

## 2023-06-20 DIAGNOSIS — C10.9 OROPHARYNGEAL CANCER (HCC): Primary | ICD-10-CM

## 2023-06-20 PROCEDURE — G8417 CALC BMI ABV UP PARAM F/U: HCPCS | Performed by: INTERNAL MEDICINE

## 2023-06-20 PROCEDURE — 3074F SYST BP LT 130 MM HG: CPT | Performed by: INTERNAL MEDICINE

## 2023-06-20 PROCEDURE — 3017F COLORECTAL CA SCREEN DOC REV: CPT | Performed by: INTERNAL MEDICINE

## 2023-06-20 PROCEDURE — 1123F ACP DISCUSS/DSCN MKR DOCD: CPT | Performed by: INTERNAL MEDICINE

## 2023-06-20 PROCEDURE — 1036F TOBACCO NON-USER: CPT | Performed by: INTERNAL MEDICINE

## 2023-06-20 PROCEDURE — 3079F DIAST BP 80-89 MM HG: CPT | Performed by: INTERNAL MEDICINE

## 2023-06-20 PROCEDURE — 99214 OFFICE O/P EST MOD 30 MIN: CPT | Performed by: INTERNAL MEDICINE

## 2023-06-20 PROCEDURE — G8427 DOCREV CUR MEDS BY ELIG CLIN: HCPCS | Performed by: INTERNAL MEDICINE

## 2023-06-20 PROCEDURE — 99211 OFF/OP EST MAY X REQ PHY/QHP: CPT | Performed by: INTERNAL MEDICINE

## 2023-06-20 NOTE — TELEPHONE ENCOUNTER
Román Merrill here for md visit  RV 6 months, scans prior   Scanned ordered and scheduled by RO for 11-8-23 10 am   RV 11-16-23 1045 am dual appt with JHOAN

## 2023-06-20 NOTE — PROGRESS NOTES
lymphadenopathy. Patient was evaluated by ENT and had a biopsy of the left tonsil which showed poorly differentiated invasive squamous cell carcinoma, p16 positive. Subsequently patient had a PET scan on 10/6/2021 which showed no distant metastasis and no lymph node metastasis noted. Patient has been evaluated by radiation oncology  He denies any unintentional weight loss, drenching night sweats fever chills. INTERVAL HISTORY  Patient is returning for follow-up visit and to discuss CT scan results lab results and further questions. He denies any difficulty swallowing. He does have mild dryness of mouth but getting better. Denies any abdominal pain nausea vomiting. Denies any unintentional weight loss, drenching night sweats fever chills. His recent scan showed no evidence of recurrence. During this visit patient's allergy, social, medical, surgical history and medications were reviewed and updated. Past Medical History:   Diagnosis Date    COVID-19     home test, mild URI sx's, no hospitalization    Health care maintenance     PCP- Maria L Maier-  seen  6/2021    History of atrial fibrillation     follows with TCC, on eliquis    Hyperlipidemia     Hypertension     Lung cancer ([address]     Tonsillar cancer ([address] 09/2021    Tonsilar cancer. ENT -Dr Randi Haro ,       Past Surgical History:   Procedure Laterality Date    ACHILLES TENDON SURGERY      about 30 years ago     COLONOSCOPY      CT NEEDLE BIOPSY LUNG PERCUTANEOUS  04/06/2022    CT NEEDLE BIOPSY LUNG PERCUTANEOUS 4/6/2022 [facility] CT SCAN    DUPUYTREN CONTRACTURE RELEASE Left 11/22/2022    LEFT SMALL FINGER  DUPUYTRENS REPAIR AND ULNAR BAND LEFT SMALL FINGER (Left)    IR PORT PLACEMENT EQUAL OR GREATER THAN 5 YEARS  07/12/2022    IR PORT PLACEMENT EQUAL OR GREATER THAN 5 YEARS 7/12/2022 Dorsie Moritz, MD [facility]    LOBECTOMY Left 05/23/2022    robotic video assisted thoracoscoopy, lower lobectomy with lymphnode dissection    LUNG REMOVAL,

## 2023-08-08 ENCOUNTER — OFFICE VISIT (OUTPATIENT)
Dept: PRIMARY CARE CLINIC | Age: 68
End: 2023-08-08
Payer: MEDICARE

## 2023-08-08 VITALS
WEIGHT: 174 LBS | DIASTOLIC BLOOD PRESSURE: 84 MMHG | OXYGEN SATURATION: 97 % | HEIGHT: 68 IN | SYSTOLIC BLOOD PRESSURE: 130 MMHG | BODY MASS INDEX: 26.37 KG/M2 | HEART RATE: 76 BPM

## 2023-08-08 DIAGNOSIS — C34.32 PRIMARY MALIGNANT NEOPLASM OF LEFT LOWER LOBE OF LUNG (HCC): ICD-10-CM

## 2023-08-08 DIAGNOSIS — Z85.819 HISTORY OF OROPHARYNGEAL CANCER: ICD-10-CM

## 2023-08-08 DIAGNOSIS — E78.2 HYPERLIPIDEMIA, MIXED: ICD-10-CM

## 2023-08-08 DIAGNOSIS — I10 ESSENTIAL HYPERTENSION: Primary | ICD-10-CM

## 2023-08-08 DIAGNOSIS — M25.552 LEFT HIP PAIN: ICD-10-CM

## 2023-08-08 PROCEDURE — G8427 DOCREV CUR MEDS BY ELIG CLIN: HCPCS | Performed by: FAMILY MEDICINE

## 2023-08-08 PROCEDURE — 99214 OFFICE O/P EST MOD 30 MIN: CPT | Performed by: FAMILY MEDICINE

## 2023-08-08 PROCEDURE — 1036F TOBACCO NON-USER: CPT | Performed by: FAMILY MEDICINE

## 2023-08-08 PROCEDURE — 3078F DIAST BP <80 MM HG: CPT | Performed by: FAMILY MEDICINE

## 2023-08-08 PROCEDURE — 3017F COLORECTAL CA SCREEN DOC REV: CPT | Performed by: FAMILY MEDICINE

## 2023-08-08 PROCEDURE — 1123F ACP DISCUSS/DSCN MKR DOCD: CPT | Performed by: FAMILY MEDICINE

## 2023-08-08 PROCEDURE — 3074F SYST BP LT 130 MM HG: CPT | Performed by: FAMILY MEDICINE

## 2023-08-08 PROCEDURE — G8417 CALC BMI ABV UP PARAM F/U: HCPCS | Performed by: FAMILY MEDICINE

## 2023-08-08 RX ORDER — HYDROCHLOROTHIAZIDE 12.5 MG/1
12.5 TABLET ORAL DAILY
Qty: 90 TABLET | Refills: 1 | Status: SHIPPED | OUTPATIENT
Start: 2023-08-08 | End: 2024-02-04

## 2023-08-08 RX ORDER — LISINOPRIL 10 MG/1
20 TABLET ORAL DAILY
Qty: 180 TABLET | Refills: 1 | Status: SHIPPED | OUTPATIENT
Start: 2023-08-08 | End: 2024-02-04

## 2023-08-08 RX ORDER — SIMVASTATIN 20 MG
20 TABLET ORAL NIGHTLY
Qty: 90 TABLET | Refills: 1 | Status: SHIPPED | OUTPATIENT
Start: 2023-08-08 | End: 2024-02-04

## 2023-08-08 SDOH — ECONOMIC STABILITY: INCOME INSECURITY: HOW HARD IS IT FOR YOU TO PAY FOR THE VERY BASICS LIKE FOOD, HOUSING, MEDICAL CARE, AND HEATING?: NOT HARD AT ALL

## 2023-08-08 SDOH — ECONOMIC STABILITY: HOUSING INSECURITY
IN THE LAST 12 MONTHS, WAS THERE A TIME WHEN YOU DID NOT HAVE A STEADY PLACE TO SLEEP OR SLEPT IN A SHELTER (INCLUDING NOW)?: NO

## 2023-08-08 SDOH — ECONOMIC STABILITY: FOOD INSECURITY: WITHIN THE PAST 12 MONTHS, THE FOOD YOU BOUGHT JUST DIDN'T LAST AND YOU DIDN'T HAVE MONEY TO GET MORE.: NEVER TRUE

## 2023-08-08 SDOH — ECONOMIC STABILITY: FOOD INSECURITY: WITHIN THE PAST 12 MONTHS, YOU WORRIED THAT YOUR FOOD WOULD RUN OUT BEFORE YOU GOT MONEY TO BUY MORE.: NEVER TRUE

## 2023-08-08 ASSESSMENT — PATIENT HEALTH QUESTIONNAIRE - PHQ9
SUM OF ALL RESPONSES TO PHQ QUESTIONS 1-9: 0
SUM OF ALL RESPONSES TO PHQ9 QUESTIONS 1 & 2: 0
1. LITTLE INTEREST OR PLEASURE IN DOING THINGS: 0
SUM OF ALL RESPONSES TO PHQ QUESTIONS 1-9: 0
2. FEELING DOWN, DEPRESSED OR HOPELESS: 0
SUM OF ALL RESPONSES TO PHQ QUESTIONS 1-9: 0
SUM OF ALL RESPONSES TO PHQ QUESTIONS 1-9: 0

## 2023-08-08 NOTE — PROGRESS NOTES
Left Ear: External ear normal.      Nose: Nose normal.      Mouth/Throat:      Mouth: Mucous membranes are moist.      Pharynx: Oropharynx is clear. Eyes:      Conjunctiva/sclera: Conjunctivae normal.   Cardiovascular:      Rate and Rhythm: Normal rate and regular rhythm. Pulses: Normal pulses. Heart sounds: Normal heart sounds. No murmur heard. Pulmonary:      Effort: Pulmonary effort is normal.      Breath sounds: Normal breath sounds. Musculoskeletal:      Cervical back: Neck supple. Right lower leg: No edema. Left lower leg: No edema. Comments: Hip and back exam full range of motion he does have some point tenderness behind the greater trochanter on the left hip. Might be related to bursa. No red flags elicited on exam  Good range of motion for flexion extension abduction and internal rotation as well as external rotation. Lymphadenopathy:      Cervical: No cervical adenopathy. Skin:     General: Skin is warm and dry. Capillary Refill: Capillary refill takes less than 2 seconds. Neurological:      General: No focal deficit present. Mental Status: He is alert and oriented to person, place, and time. Psychiatric:         Mood and Affect: Mood normal.         Behavior: Behavior normal.       Assessment/Plan:     1. Essential hypertension    2. Hyperlipidemia, mixed    3. History of oropharyngeal cancer    4. Primary malignant neoplasm of left lower lobe of lung ([address])    5. Left hip pain          Debby Vázquez was seen today for hypertension, cholesterol problem, cancer and hip pain.     Diagnoses and all orders for this visit:    Essential hypertension  Continue lisinopril hydrochlorothiazide and metoprolol all good control  Hyperlipidemia, mixed  Continue simvastatin  History of oropharyngeal cancer  Follows with oncology  Primary malignant neoplasm of left lower lobe of lung (HCC)  Stable  Left hip pain  Recommend ice range of motion exercises we will check x-rays

## 2023-08-12 ASSESSMENT — ENCOUNTER SYMPTOMS
CHEST TIGHTNESS: 0
DIARRHEA: 0
SHORTNESS OF BREATH: 0
CONSTIPATION: 0
SORE THROAT: 0
BACK PAIN: 0
COUGH: 0

## 2023-10-12 DIAGNOSIS — E78.2 HYPERLIPIDEMIA, MIXED: ICD-10-CM

## 2023-10-16 NOTE — TELEPHONE ENCOUNTER
Tried to call patient to schedule f/u appointment, no answer. Left message for patient to call the office back to schedule an appointment. Left office number.           Zulema Yves is requesting a refill on the following medication(s):  Requested Prescriptions     Pending Prescriptions Disp Refills    simvastatin (ZOCOR) 20 MG tablet [Pharmacy Med Name: Simvastatin 20 MG Oral Tablet] 100 tablet 2     Sig: TAKE 1 TABLET BY MOUTH NIGHTLY       Last Visit Date (If Applicable):  8/6/1068    Next Visit Date:    Visit date not found

## 2023-10-17 RX ORDER — SIMVASTATIN 20 MG
20 TABLET ORAL NIGHTLY
Qty: 90 TABLET | Refills: 1 | Status: SHIPPED | OUTPATIENT
Start: 2023-10-17 | End: 2024-04-14

## 2023-11-08 ENCOUNTER — HOSPITAL ENCOUNTER (OUTPATIENT)
Dept: CT IMAGING | Age: 68
Discharge: HOME | End: 2023-11-10
Payer: MEDICARE

## 2023-11-08 DIAGNOSIS — C10.9 OROPHARYNGEAL CANCER (HCC): ICD-10-CM

## 2023-11-08 DIAGNOSIS — C34.32 PRIMARY MALIGNANT NEOPLASM OF LEFT LOWER LOBE OF LUNG (HCC): ICD-10-CM

## 2023-11-08 LAB
CREAT SERPL-MCNC: 0.9 MG/DL (ref 0.7–1.2)
GFR SERPL CREATININE-BSD FRML MDRD: >60 ML/MIN/1.73M2

## 2023-11-08 PROCEDURE — 70491 CT SOFT TISSUE NECK W/DYE: CPT

## 2023-11-08 PROCEDURE — 71260 CT THORAX DX C+: CPT

## 2023-11-08 PROCEDURE — 82565 ASSAY OF CREATININE: CPT

## 2023-11-08 PROCEDURE — 2580000003 HC RX 258: Performed by: RADIOLOGY

## 2023-11-08 PROCEDURE — 6360000004 HC RX CONTRAST MEDICATION: Performed by: RADIOLOGY

## 2023-11-08 PROCEDURE — 36415 COLL VENOUS BLD VENIPUNCTURE: CPT

## 2023-11-08 RX ORDER — 0.9 % SODIUM CHLORIDE 0.9 %
80 INTRAVENOUS SOLUTION INTRAVENOUS ONCE
Status: COMPLETED | OUTPATIENT
Start: 2023-11-08 | End: 2023-11-08

## 2023-11-08 RX ORDER — SODIUM CHLORIDE 0.9 % (FLUSH) 0.9 %
10 SYRINGE (ML) INJECTION PRN
Status: DISCONTINUED | OUTPATIENT
Start: 2023-11-08 | End: 2023-11-11 | Stop reason: HOSPADM

## 2023-11-08 RX ADMIN — SODIUM CHLORIDE 80 ML: 9 INJECTION, SOLUTION INTRAVENOUS at 09:47

## 2023-11-08 RX ADMIN — IOPAMIDOL 75 ML: 755 INJECTION, SOLUTION INTRAVENOUS at 09:47

## 2023-11-08 RX ADMIN — SODIUM CHLORIDE, PRESERVATIVE FREE 10 ML: 5 INJECTION INTRAVENOUS at 09:47

## 2023-11-16 ENCOUNTER — HOSPITAL ENCOUNTER (OUTPATIENT)
Dept: RADIATION ONCOLOGY | Age: 68
Discharge: HOME | End: 2023-11-16
Attending: RADIOLOGY
Payer: MEDICARE

## 2023-11-16 VITALS
DIASTOLIC BLOOD PRESSURE: 81 MMHG | WEIGHT: 169.8 LBS | HEART RATE: 71 BPM | SYSTOLIC BLOOD PRESSURE: 120 MMHG | RESPIRATION RATE: 16 BRPM | TEMPERATURE: 97.6 F | BODY MASS INDEX: 25.82 KG/M2

## 2023-11-16 DIAGNOSIS — C34.32 PRIMARY MALIGNANT NEOPLASM OF LEFT LOWER LOBE OF LUNG (HCC): Primary | ICD-10-CM

## 2023-11-16 PROCEDURE — 99212 OFFICE O/P EST SF 10 MIN: CPT | Performed by: RADIOLOGY

## 2023-11-16 ASSESSMENT — PAIN DESCRIPTION - LOCATION: LOCATION: CHEST

## 2023-11-16 ASSESSMENT — PAIN SCALES - GENERAL: PAINLEVEL_OUTOF10: 3

## 2023-11-16 ASSESSMENT — PAIN DESCRIPTION - ORIENTATION: ORIENTATION: LEFT

## 2023-11-28 ENCOUNTER — TELEPHONE (OUTPATIENT)
Dept: ONCOLOGY | Age: 68
End: 2023-11-28

## 2023-11-28 ENCOUNTER — OFFICE VISIT (OUTPATIENT)
Dept: ONCOLOGY | Age: 68
End: 2023-11-28
Payer: MEDICARE

## 2023-11-28 VITALS
BODY MASS INDEX: 26.27 KG/M2 | HEART RATE: 91 BPM | TEMPERATURE: 95.9 F | SYSTOLIC BLOOD PRESSURE: 137 MMHG | WEIGHT: 172.8 LBS | RESPIRATION RATE: 14 BRPM | DIASTOLIC BLOOD PRESSURE: 82 MMHG

## 2023-11-28 DIAGNOSIS — C34.32 PRIMARY MALIGNANT NEOPLASM OF LEFT LOWER LOBE OF LUNG (HCC): Primary | ICD-10-CM

## 2023-11-28 PROCEDURE — 99214 OFFICE O/P EST MOD 30 MIN: CPT | Performed by: INTERNAL MEDICINE

## 2023-11-28 PROCEDURE — 3075F SYST BP GE 130 - 139MM HG: CPT | Performed by: INTERNAL MEDICINE

## 2023-11-28 PROCEDURE — 1123F ACP DISCUSS/DSCN MKR DOCD: CPT | Performed by: INTERNAL MEDICINE

## 2023-11-28 PROCEDURE — 3079F DIAST BP 80-89 MM HG: CPT | Performed by: INTERNAL MEDICINE

## 2023-11-28 PROCEDURE — G8427 DOCREV CUR MEDS BY ELIG CLIN: HCPCS | Performed by: INTERNAL MEDICINE

## 2023-11-28 PROCEDURE — 3017F COLORECTAL CA SCREEN DOC REV: CPT | Performed by: INTERNAL MEDICINE

## 2023-11-28 PROCEDURE — G8417 CALC BMI ABV UP PARAM F/U: HCPCS | Performed by: INTERNAL MEDICINE

## 2023-11-28 PROCEDURE — 1036F TOBACCO NON-USER: CPT | Performed by: INTERNAL MEDICINE

## 2023-11-28 PROCEDURE — 99211 OFF/OP EST MAY X REQ PHY/QHP: CPT | Performed by: INTERNAL MEDICINE

## 2023-11-28 PROCEDURE — G8484 FLU IMMUNIZE NO ADMIN: HCPCS | Performed by: INTERNAL MEDICINE

## 2023-11-28 NOTE — TELEPHONE ENCOUNTER
AVS from 11/28/23    RTC 6 months with labs  [email or web address] l;[email or web address] cbc cmp    PT was given AVS and appt schedule

## 2023-11-28 NOTE — PROGRESS NOTES
Patient ID: Dallin Thomason, 1955, [id number], 76 y.o. Referred by : Dee Jaramillo MD  Diagnosis:   Oropharyngeal carcin shirley, left tonsil squamous cell carcinoma, p16 positive, clinical stage T2 a N0 M0, stage I diseasePatient has been evaluated by ENT surgical oncology at [facility] and as per the tumor board recommendation definitive radiation therapy was recommended   Patient started on definitive radiation therapy on 11/9/2021   He  completed RT on December 29, 2021      Cancer Staging   Oropharyngeal cancer [facility])  Staging form: Pharynx - HPV-Mediated Oropharynx, AJCC 8th Edition  - Clinical stage from 10/12/2021: Stage I (cT2, cN0, cM0) - Signed by Jose Ramos MD on 10/12/2021    Primary malignant neoplasm of left lower lobe of lung ([address])  Staging form: Lung, AJCC 8th Edition  - Clinical stage from 4/6/2022: Stage IA2 (cT1b, cN0, cM0) - Signed by Marko Mendez MD on 4/13/2022      4. On 3/28/2022 had a PET scan which showed resolution of uptake in the tonsil and no active lymph node in the cervical region however 1.9 cm left lower lobe nodule noted with FDG activity. Patient had CT-guided biopsy on 4/6/2022 which showed adenocarcinoma  5. Patient underwent Left lower lobectomy on 5/23/2022. Final surgical pathology showed T2 a N0 M0, with invasion of the visceral pleura with extension to the pleural surface  6. 7/19/22  started on adjuvant chemo with Amy Alimta  Cycle #4  given on 9/20/22    HISTORY OF PRESENT ILLNESS:    Oncologic History:  Dallin Thomason is 76 y.o. male was seen during initial consultation with for newly diagnosed left tonsillar/oropharyngeal carcinoma. Patient initially presented with sore throat/throat pain in May of this year and was referred to ENT for further evaluation. His ENT evaluation did show 3 cm left tonsillar mass limited to tonsils only.   Patient had a CT of the neck on 9/22/2021 which showed 3.2 cm mass in the left palatine tonsil without any

## 2023-12-07 DIAGNOSIS — I10 ESSENTIAL HYPERTENSION: ICD-10-CM

## 2023-12-07 NOTE — TELEPHONE ENCOUNTER
Aristeo Peralta is calling to request a refill on the following medication(s):    Last Visit Date (If Applicable):  9/6/6003    Next Visit Date:    1/24/2024    Medication Request:  Requested Prescriptions     Pending Prescriptions Disp Refills    lisinopril (PRINIVIL;ZESTRIL) 10 MG tablet [Pharmacy Med Name: Lisinopril 10 MG Oral Tablet] 200 tablet 2     Sig: TAKE 2 TABLETS BY MOUTH DAILY

## 2023-12-08 DIAGNOSIS — I10 ESSENTIAL HYPERTENSION: ICD-10-CM

## 2023-12-08 RX ORDER — HYDROCHLOROTHIAZIDE 12.5 MG/1
12.5 TABLET ORAL DAILY
Qty: 100 TABLET | Refills: 2 | OUTPATIENT
Start: 2023-12-08 | End: 2024-06-05

## 2023-12-08 RX ORDER — LISINOPRIL 10 MG/1
20 TABLET ORAL DAILY
Qty: 200 TABLET | Refills: 2 | Status: SHIPPED | OUTPATIENT
Start: 2023-12-08 | End: 2024-06-05

## 2023-12-21 DIAGNOSIS — E78.2 HYPERLIPIDEMIA, MIXED: ICD-10-CM

## 2023-12-22 NOTE — TELEPHONE ENCOUNTER
Destin Gonzalez is calling to request a refill on the following medication(s):    Last Visit Date (If Applicable):  4/6/1647    Next Visit Date:    1/24/2024    Medication Request:  Requested Prescriptions     Pending Prescriptions Disp Refills    simvastatin (ZOCOR) 20 MG tablet [Pharmacy Med Name: Simvastatin 20 MG Oral Tablet] 100 tablet 2     Sig: TAKE 1 TABLET BY MOUTH NIGHTLY

## 2023-12-26 RX ORDER — SIMVASTATIN 20 MG
20 TABLET ORAL NIGHTLY
Qty: 100 TABLET | Refills: 2 | Status: SHIPPED | OUTPATIENT
Start: 2023-12-26 | End: 2024-06-23

## 2024-01-03 ENCOUNTER — HOSPITAL ENCOUNTER (OUTPATIENT)
Dept: PREADMISSION TESTING | Age: 69
Discharge: HOME | End: 2024-01-03
Payer: MEDICARE

## 2024-01-03 VITALS
HEIGHT: 68 IN | SYSTOLIC BLOOD PRESSURE: 149 MMHG | OXYGEN SATURATION: 99 % | BODY MASS INDEX: 26.52 KG/M2 | DIASTOLIC BLOOD PRESSURE: 75 MMHG | RESPIRATION RATE: 16 BRPM | TEMPERATURE: 97.6 F | WEIGHT: 175 LBS | HEART RATE: 105 BPM

## 2024-01-03 DIAGNOSIS — Z01.818 PRE-OP TESTING: Primary | ICD-10-CM

## 2024-01-03 LAB
ANION GAP SERPL CALCULATED.3IONS-SCNC: 11 MMOL/L (ref 9–17)
BUN SERPL-MCNC: 15 MG/DL (ref 8–23)
CALCIUM SERPL-MCNC: 10.2 MG/DL (ref 8.6–10.4)
CHLORIDE SERPL-SCNC: 100 MMOL/L (ref 98–107)
CO2 SERPL-SCNC: 28 MMOL/L (ref 20–31)
CREAT SERPL-MCNC: 1 MG/DL (ref 0.7–1.2)
EKG ATRIAL RATE: 468 BPM
EKG Q-T INTERVAL: 372 MS
EKG QRS DURATION: 98 MS
EKG QTC CALCULATION (BAZETT): 442 MS
EKG R AXIS: -52 DEGREES
EKG T AXIS: 12 DEGREES
EKG VENTRICULAR RATE: 85 BPM
ERYTHROCYTE [DISTWIDTH] IN BLOOD BY AUTOMATED COUNT: 13.7 % (ref 12.5–15.4)
GFR SERPL CREATININE-BSD FRML MDRD: >60 ML/MIN/1.73M2
GLUCOSE SERPL-MCNC: 143 MG/DL (ref 70–99)
HCT VFR BLD AUTO: 43.2 % (ref 41–53)
HGB BLD-MCNC: 14.8 G/DL (ref 13.5–17.5)
MCH RBC QN AUTO: 30.8 PG (ref 26–34)
MCHC RBC AUTO-ENTMCNC: 34.2 G/DL (ref 31–37)
MCV RBC AUTO: 89.8 FL (ref 80–100)
PLATELET # BLD AUTO: 291 K/UL (ref 140–450)
PMV BLD AUTO: 7.7 FL (ref 6–12)
POTASSIUM SERPL-SCNC: 4.2 MMOL/L (ref 3.7–5.3)
RBC # BLD AUTO: 4.81 M/UL (ref 4.5–5.9)
SODIUM SERPL-SCNC: 139 MMOL/L (ref 135–144)
WBC OTHER # BLD: 5 K/UL (ref 3.5–11)

## 2024-01-03 PROCEDURE — 85027 COMPLETE CBC AUTOMATED: CPT

## 2024-01-03 PROCEDURE — 36415 COLL VENOUS BLD VENIPUNCTURE: CPT

## 2024-01-03 PROCEDURE — 80048 BASIC METABOLIC PNL TOTAL CA: CPT

## 2024-01-03 NOTE — DISCHARGE INSTRUCTIONS
DAY OF SURGERY/PROCEDURE  GUIDELINES    As a patient at the [facility], you can expect quality medical and nursing care that is centered on your individual needs. It is our goal to make your surgical experience as comfortable and excellent as possible.  ________________________________________________________________________    The following instructions are general guidelines, if any information on this sheet is different from what your doctor has instructed you to do, please follow your doctor's instructions.    Please arrive on 1/16 @ 545 am      Enter through entrance C. Check in at registration     Upon arrival you will be taken to the pre-operative area to get ready for surgery, your family will stay in the waiting room and visit with you once you are ready for surgery. Due to special limitations please limit visitation to 1-2 members of your family at a time. When it is time for surgery your family will return to the waiting room.    Nothing to eat, drink, smoke, suck or chew after midnight (no water, gum, mints, cigarettes, cigars, pipes, snuff, chewing tobacco, etc.) or your surgery may be canceled.     Take a shower or bath on the morning of your surgery/procedure (Hibiclens if directed) Do not apply any lotions.    Brush your teeth, but do not swallow any water    IN CASE OF ILLNESS - If you have a cold or flu symptoms (high fever, runny nose, sore throat, cough, etc.) rash, nausea, vomiting, loose stools, and/or recent contact with someone who has a contagious disease (chick pox, measles, etc.) please call your doctor before coming to the surgery center    Take a small sip of water with heart, blood pressure, and/or seizure medication the morning of surgery.     If applicable bring your:  Inhaler (s)  Hearing aid(s)  Eyeglasses and Case (If you wear contacts they have to be removed before surgery, bring case and solution)  CPAP     DO NOT take anticoagulants (blood thinners, aspirin

## 2024-01-04 ENCOUNTER — ANESTHESIA EVENT (OUTPATIENT)
Dept: OPERATING ROOM | Age: 69
End: 2024-01-04
Payer: MEDICARE

## 2024-01-08 LAB
EKG ATRIAL RATE: 468 BPM
EKG Q-T INTERVAL: 372 MS
EKG QRS DURATION: 98 MS
EKG QTC CALCULATION (BAZETT): 442 MS
EKG R AXIS: -52 DEGREES
EKG T AXIS: 12 DEGREES
EKG VENTRICULAR RATE: 85 BPM

## 2024-01-10 NOTE — DISCHARGE INSTRUCTIONS
DISCHARGE INSTRUCTIONS FOR HAND/WRIST SURGERY    In order to continue your care at home, please follow the instructions below.    For General Anesthesia  Do not drive, drink any alcoholic beverages, or make any legal or important decisions for 24 hours.     Diet    After general anesthesia, start out eating lightly (broth, soup, crackers, toast, etc.) advancing as tolerated to your usual diet.  Try to avoid spicy or greasy/fatty foods for 24 hours.   Drink plenty of fluids after surgery, unless you are on a fluid restriction.  Avoid milk/milk product for several hours.      Medications  Take medications as instructed by your surgeon.   Please do not take prescribed pain medication with alcoholic beverages.  When cleared to drive by your surgeon, please do not drive or operate machinery while taking any prescribed pain medication.    Activities  As instructed by your surgeon  Limit your activities for 24 hours  Avoid heavy work or sports until surgeon approves.   No lifting, pushing, pulling, twisting, or pressing down on hand or wrist.  No driving or operating machinery until cleared by surgeon.  May shower as long as dressings stay dry with plastic bag coverage.    Surgery Area  Always keep dressings/incisions clean, dry.  Do not remove dressings until seen in office, unless instructed otherwise by your surgeon.  To reduce swelling and pain keep surgical hand/wrist elevated above heart level with pillows.    Call your surgeon for the following:  You have pain that does not get better after you take pain medicine.   For an oral temperature (by mouth) is 101 degrees or higher, chills, or excessive sweating.  You have increasing and progressive bleeding or drainage from surgery site.  Signs of an infection:  increased swelling, redness, warmth, or hardness around surgery area or yellow or green drainage from incision.  If your fingers are pale, blue or cold to touch.  If swelling increases while elevated.  Persistent

## 2024-01-16 ENCOUNTER — HOSPITAL ENCOUNTER (OUTPATIENT)
Age: 69
Setting detail: OUTPATIENT SURGERY
Discharge: HOME | End: 2024-01-16
Attending: PLASTIC SURGERY | Admitting: PLASTIC SURGERY
Payer: MEDICARE

## 2024-01-16 ENCOUNTER — ANESTHESIA (OUTPATIENT)
Dept: OPERATING ROOM | Age: 69
End: 2024-01-16
Payer: MEDICARE

## 2024-01-16 VITALS
SYSTOLIC BLOOD PRESSURE: 110 MMHG | TEMPERATURE: 97.1 F | OXYGEN SATURATION: 97 % | RESPIRATION RATE: 17 BRPM | WEIGHT: 173 LBS | HEART RATE: 71 BPM | HEIGHT: 68 IN | BODY MASS INDEX: 26.22 KG/M2 | DIASTOLIC BLOOD PRESSURE: 63 MMHG

## 2024-01-16 DIAGNOSIS — M72.0 DUPUYTREN'S DISEASE OF FINGER WITH CONTRACTURE: ICD-10-CM

## 2024-01-16 PROCEDURE — 88305 TISSUE EXAM BY PATHOLOGIST: CPT

## 2024-01-16 PROCEDURE — 3600000002 HC SURGERY LEVEL 2 BASE: Performed by: PLASTIC SURGERY

## 2024-01-16 PROCEDURE — 2580000003 HC RX 258: Performed by: ANESTHESIOLOGY

## 2024-01-16 PROCEDURE — 7100000000 HC PACU RECOVERY - FIRST 15 MIN: Performed by: PLASTIC SURGERY

## 2024-01-16 PROCEDURE — 6360000002 HC RX W HCPCS: Performed by: PLASTIC SURGERY

## 2024-01-16 PROCEDURE — 3700000001 HC ADD 15 MINUTES (ANESTHESIA): Performed by: PLASTIC SURGERY

## 2024-01-16 PROCEDURE — 2500000003 HC RX 250 WO HCPCS: Performed by: PLASTIC SURGERY

## 2024-01-16 PROCEDURE — 2709999900 HC NON-CHARGEABLE SUPPLY: Performed by: PLASTIC SURGERY

## 2024-01-16 PROCEDURE — 7100000001 HC PACU RECOVERY - ADDTL 15 MIN: Performed by: PLASTIC SURGERY

## 2024-01-16 PROCEDURE — 2580000003 HC RX 258: Performed by: PLASTIC SURGERY

## 2024-01-16 PROCEDURE — 3700000000 HC ANESTHESIA ATTENDED CARE: Performed by: PLASTIC SURGERY

## 2024-01-16 PROCEDURE — 6360000002 HC RX W HCPCS: Performed by: NURSE ANESTHETIST, CERTIFIED REGISTERED

## 2024-01-16 PROCEDURE — 7100000010 HC PHASE II RECOVERY - FIRST 15 MIN: Performed by: PLASTIC SURGERY

## 2024-01-16 PROCEDURE — 3600000012 HC SURGERY LEVEL 2 ADDTL 15MIN: Performed by: PLASTIC SURGERY

## 2024-01-16 PROCEDURE — 2500000003 HC RX 250 WO HCPCS: Performed by: NURSE ANESTHETIST, CERTIFIED REGISTERED

## 2024-01-16 RX ORDER — SODIUM CHLORIDE 0.9 % (FLUSH) 0.9 %
5-40 SYRINGE (ML) INJECTION PRN
Status: DISCONTINUED | OUTPATIENT
Start: 2024-01-16 | End: 2024-01-16 | Stop reason: HOSPADM

## 2024-01-16 RX ORDER — MIDAZOLAM HYDROCHLORIDE 2 MG/2ML
2 INJECTION, SOLUTION INTRAMUSCULAR; INTRAVENOUS
Status: DISCONTINUED | OUTPATIENT
Start: 2024-01-16 | End: 2024-01-16 | Stop reason: HOSPADM

## 2024-01-16 RX ORDER — CEFAZOLIN 2 G/1
INJECTION, POWDER, FOR SOLUTION INTRAMUSCULAR; INTRAVENOUS
Status: DISCONTINUED
Start: 2024-01-16 | End: 2024-01-16 | Stop reason: HOSPADM

## 2024-01-16 RX ORDER — LABETALOL HYDROCHLORIDE 5 MG/ML
10 INJECTION, SOLUTION INTRAVENOUS
Status: DISCONTINUED | OUTPATIENT
Start: 2024-01-16 | End: 2024-01-16 | Stop reason: HOSPADM

## 2024-01-16 RX ORDER — CEPHALEXIN 500 MG/1
500 CAPSULE ORAL 3 TIMES DAILY
Qty: 15 CAPSULE | Refills: 0 | Status: SHIPPED | OUTPATIENT
Start: 2024-01-16 | End: 2024-01-21

## 2024-01-16 RX ORDER — ONDANSETRON 2 MG/ML
INJECTION INTRAMUSCULAR; INTRAVENOUS PRN
Status: DISCONTINUED | OUTPATIENT
Start: 2024-01-16 | End: 2024-01-16 | Stop reason: SDUPTHER

## 2024-01-16 RX ORDER — ONDANSETRON 2 MG/ML
4 INJECTION INTRAMUSCULAR; INTRAVENOUS
Status: DISCONTINUED | OUTPATIENT
Start: 2024-01-16 | End: 2024-01-16 | Stop reason: HOSPADM

## 2024-01-16 RX ORDER — METOCLOPRAMIDE HYDROCHLORIDE 5 MG/ML
10 INJECTION INTRAMUSCULAR; INTRAVENOUS
Status: DISCONTINUED | OUTPATIENT
Start: 2024-01-16 | End: 2024-01-16 | Stop reason: HOSPADM

## 2024-01-16 RX ORDER — PHENYLEPHRINE HCL IN 0.9% NACL 1 MG/10 ML
SYRINGE (ML) INTRAVENOUS PRN
Status: DISCONTINUED | OUTPATIENT
Start: 2024-01-16 | End: 2024-01-16 | Stop reason: SDUPTHER

## 2024-01-16 RX ORDER — SODIUM CHLORIDE 0.9 % (FLUSH) 0.9 %
5-40 SYRINGE (ML) INJECTION EVERY 12 HOURS SCHEDULED
Status: DISCONTINUED | OUTPATIENT
Start: 2024-01-16 | End: 2024-01-16 | Stop reason: HOSPADM

## 2024-01-16 RX ORDER — SODIUM CHLORIDE 9 MG/ML
INJECTION, SOLUTION INTRAVENOUS PRN
Status: DISCONTINUED | OUTPATIENT
Start: 2024-01-16 | End: 2024-01-16 | Stop reason: HOSPADM

## 2024-01-16 RX ORDER — DIPHENHYDRAMINE HYDROCHLORIDE 50 MG/ML
12.5 INJECTION INTRAMUSCULAR; INTRAVENOUS
Status: DISCONTINUED | OUTPATIENT
Start: 2024-01-16 | End: 2024-01-16 | Stop reason: HOSPADM

## 2024-01-16 RX ORDER — DEXAMETHASONE SODIUM PHOSPHATE 10 MG/ML
INJECTION, SOLUTION INTRAMUSCULAR; INTRAVENOUS PRN
Status: DISCONTINUED | OUTPATIENT
Start: 2024-01-16 | End: 2024-01-16 | Stop reason: SDUPTHER

## 2024-01-16 RX ORDER — PROPOFOL 10 MG/ML
INJECTION, EMULSION INTRAVENOUS PRN
Status: DISCONTINUED | OUTPATIENT
Start: 2024-01-16 | End: 2024-01-16 | Stop reason: SDUPTHER

## 2024-01-16 RX ORDER — FENTANYL CITRATE 50 UG/ML
INJECTION, SOLUTION INTRAMUSCULAR; INTRAVENOUS PRN
Status: DISCONTINUED | OUTPATIENT
Start: 2024-01-16 | End: 2024-01-16 | Stop reason: SDUPTHER

## 2024-01-16 RX ORDER — LIDOCAINE HYDROCHLORIDE 10 MG/ML
INJECTION, SOLUTION INFILTRATION; PERINEURAL PRN
Status: DISCONTINUED | OUTPATIENT
Start: 2024-01-16 | End: 2024-01-16 | Stop reason: SDUPTHER

## 2024-01-16 RX ORDER — LIDOCAINE HYDROCHLORIDE 10 MG/ML
1 INJECTION, SOLUTION EPIDURAL; INFILTRATION; INTRACAUDAL; PERINEURAL
Status: DISCONTINUED | OUTPATIENT
Start: 2024-01-16 | End: 2024-01-16 | Stop reason: HOSPADM

## 2024-01-16 RX ORDER — OXYCODONE HYDROCHLORIDE AND ACETAMINOPHEN 5; 325 MG/1; MG/1
1 TABLET ORAL EVERY 6 HOURS PRN
Qty: 28 TABLET | Refills: 0 | Status: SHIPPED | OUTPATIENT
Start: 2024-01-16 | End: 2024-01-23

## 2024-01-16 RX ORDER — MEPERIDINE HYDROCHLORIDE 50 MG/ML
12.5 INJECTION INTRAMUSCULAR; INTRAVENOUS; SUBCUTANEOUS EVERY 5 MIN PRN
Status: DISCONTINUED | OUTPATIENT
Start: 2024-01-16 | End: 2024-01-16 | Stop reason: HOSPADM

## 2024-01-16 RX ORDER — BUPIVACAINE HYDROCHLORIDE AND EPINEPHRINE 5; 5 MG/ML; UG/ML
INJECTION, SOLUTION EPIDURAL; INTRACAUDAL; PERINEURAL PRN
Status: DISCONTINUED | OUTPATIENT
Start: 2024-01-16 | End: 2024-01-16 | Stop reason: ALTCHOICE

## 2024-01-16 RX ORDER — MORPHINE SULFATE 2 MG/ML
2 INJECTION, SOLUTION INTRAMUSCULAR; INTRAVENOUS EVERY 5 MIN PRN
Status: DISCONTINUED | OUTPATIENT
Start: 2024-01-16 | End: 2024-01-16 | Stop reason: HOSPADM

## 2024-01-16 RX ORDER — SODIUM CHLORIDE, SODIUM LACTATE, POTASSIUM CHLORIDE, CALCIUM CHLORIDE 600; 310; 30; 20 MG/100ML; MG/100ML; MG/100ML; MG/100ML
INJECTION, SOLUTION INTRAVENOUS CONTINUOUS
Status: DISCONTINUED | OUTPATIENT
Start: 2024-01-16 | End: 2024-01-16 | Stop reason: HOSPADM

## 2024-01-16 RX ORDER — MIDAZOLAM HYDROCHLORIDE 1 MG/ML
INJECTION INTRAMUSCULAR; INTRAVENOUS PRN
Status: DISCONTINUED | OUTPATIENT
Start: 2024-01-16 | End: 2024-01-16 | Stop reason: SDUPTHER

## 2024-01-16 RX ADMIN — Medication 100 MCG: at 07:39

## 2024-01-16 RX ADMIN — FENTANYL CITRATE 25 MCG: 50 INJECTION, SOLUTION INTRAMUSCULAR; INTRAVENOUS at 07:21

## 2024-01-16 RX ADMIN — SODIUM CHLORIDE, POTASSIUM CHLORIDE, SODIUM LACTATE AND CALCIUM CHLORIDE: 600; 310; 30; 20 INJECTION, SOLUTION INTRAVENOUS at 08:03

## 2024-01-16 RX ADMIN — FENTANYL CITRATE 50 MCG: 50 INJECTION, SOLUTION INTRAMUSCULAR; INTRAVENOUS at 08:06

## 2024-01-16 RX ADMIN — ONDANSETRON 4 MG: 2 INJECTION INTRAMUSCULAR; INTRAVENOUS at 07:51

## 2024-01-16 RX ADMIN — FENTANYL CITRATE 25 MCG: 50 INJECTION, SOLUTION INTRAMUSCULAR; INTRAVENOUS at 07:26

## 2024-01-16 RX ADMIN — LIDOCAINE HYDROCHLORIDE 50 MG: 10 INJECTION, SOLUTION INFILTRATION; PERINEURAL at 06:56

## 2024-01-16 RX ADMIN — SODIUM CHLORIDE, POTASSIUM CHLORIDE, SODIUM LACTATE AND CALCIUM CHLORIDE: 600; 310; 30; 20 INJECTION, SOLUTION INTRAVENOUS at 06:22

## 2024-01-16 RX ADMIN — PROPOFOL 200 MG: 10 INJECTION, EMULSION INTRAVENOUS at 06:56

## 2024-01-16 RX ADMIN — CEFAZOLIN 2000 MG: 2 INJECTION, POWDER, FOR SOLUTION INTRAMUSCULAR; INTRAVENOUS at 07:00

## 2024-01-16 RX ADMIN — DEXAMETHASONE SODIUM PHOSPHATE 10 MG: 10 INJECTION INTRAMUSCULAR; INTRAVENOUS at 07:03

## 2024-01-16 RX ADMIN — MIDAZOLAM 2 MG: 1 INJECTION INTRAMUSCULAR; INTRAVENOUS at 06:54

## 2024-01-16 ASSESSMENT — PAIN - FUNCTIONAL ASSESSMENT
PAIN_FUNCTIONAL_ASSESSMENT: NONE - DENIES PAIN
PAIN_FUNCTIONAL_ASSESSMENT: NONE - DENIES PAIN

## 2024-01-16 NOTE — ANESTHESIA PRE PROCEDURE
smoking \"40 yrs ago\"   Substance Use Topics   • Alcohol use: Yes     Comment: socially -2x a week                                Counseling given: Not Answered  Tobacco comments: states quit smoking \"40 yrs ago\"      Vital Signs (Current):   Vitals:    01/16/24 0601 01/16/24 0609   BP:  (!) 154/75   Pulse:  (!) 101   Resp:  16   Temp: 96.9 °F (36.1 °C)    SpO2:  99%   Weight: 78.5 kg (173 lb)    Height: 1.727 m (5' 8\")                                               BP Readings from Last 3 Encounters:   01/16/24 (!) 154/75   01/03/24 (!) 149/75   11/28/23 137/82       NPO Status: Time of last liquid consumption: 0430 (meds with sip of water)                        Time of last solid consumption: 1800                        Date of last liquid consumption: 01/16/24                        Date of last solid food consumption: 01/15/24    BMI:   Wt Readings from Last 3 Encounters:   01/16/24 78.5 kg (173 lb)   01/03/24 79.4 kg (175 lb)   11/28/23 78.4 kg (172 lb 12.8 oz)     Body mass index is 26.3 kg/m².    CBC:   Lab Results   Component Value Date/Time    WBC 5.0 01/03/2024 01:54 PM    RBC 4.81 01/03/2024 01:54 PM    HGB 14.8 01/03/2024 01:54 PM    HCT 43.2 01/03/2024 01:54 PM    MCV 89.8 01/03/2024 01:54 PM    RDW 13.7 01/03/2024 01:54 PM     01/03/2024 01:54 PM       CMP:   Lab Results   Component Value Date/Time     01/03/2024 01:54 PM    K 4.2 01/03/2024 01:54 PM     01/03/2024 01:54 PM    CO2 28 01/03/2024 01:54 PM    BUN 15 01/03/2024 01:54 PM    CREATININE 1.0 01/03/2024 01:54 PM    GFRAA >60 09/20/2022 08:36 AM    LABGLOM >60 01/03/2024 01:54 PM    GLUCOSE 143 01/03/2024 01:54 PM    PROT 7.4 05/11/2023 08:51 AM    CALCIUM 10.2 01/03/2024 01:54 PM    BILITOT 1.0 05/11/2023 08:51 AM    ALKPHOS 65 05/11/2023 08:51 AM    AST 22 05/11/2023 08:51 AM    ALT 16 05/11/2023 08:51 AM       POC Tests: No results for input(s): \"POCGLU\", \"POCNA\", \"POCK\", \"POCCL\", \"POCBUN\", \"POCHEMO\", \"POCHCT\" in the last 72

## 2024-01-16 NOTE — OP NOTE
Operative Note      Patient: Wolf Daniels  YOB: 1955  MRN: [id number]    Date of Procedure: 1/16/2024    Pre-Op Diagnosis Codes:     * Dupuytren's disease of finger with contracture [M72.0]    Post-Op Diagnosis: Same       Procedure(s):  RIGHT RING AND SMALL FINGERS DUPUYTRENS RELEASE -release MCP x 2    Surgeon(s):  Julio Kowalski MD    Assistant:   * No surgical staff found *    Anesthesia: General    Estimated Blood Loss (mL): less than 50     Complications: None    Specimens:   ID Type Source Tests Collected by Time Destination   A : DUPUYTRENS RIGHT RING AND SMALL FINGER Tissue Tissue SURGICAL PATHOLOGY Julio Kowalski MD 1/16/2024 0752        Implants:  * No implants in log *      Drains: * No LDAs found *    Findings: Dense contracture against flexor tendon sheath.  Proper digital nerve x 2 and ulnar digital nerve of the small finger all identified and preserved    Detailed Description of Procedure:   With the patient spine and general anesthesia induced via posterior pharyngeal LMA intubation the right hand was prepped and draped in a sterile fashion.  Appropriate timeout was performed.  The incisions were designed and the hand was injected with half percent Marcaine 1-200,000 epinephrine local.    Tourniquet was elevated to 250 mmHg after the arm was exsanguinated with an Esmarch with a total tourniquet time of 42 minutes.  The Dhruv incisions were incised as designed over the ring and small finger.  Flaps were elevated.  The elevation was taken back to normal-appearing palmar fascia.  The disease was transected near the palm base and dissection was carried out proximal to distal.  Extreme amount of scarring between the flexor tendon sheath of the small and ring fingers was noted with the Dupuytren's, but far more than normally seen.  The common digital nerve between the middle and the ring as well as between the ring and the small finger were identified and meticulously  dissected proximal to distal.  Both bifurcations were identified and the nerves distal to this were identified and preserved as the Dupuytren's was divided.  The ulnar digital nerve to the small finger could not be identified proximally so a distal approach was taken and it was identified and tracked in a retrograde fashion with preservation.  The MCP joints were released of the Dupuytren's contracture and were able to be completely extended.  The tourniquet was released and bleeding was controlled with electrocautery and the wounds were closed with interrupted sutures of 3-0 Prolene.  Sterile dressing over a Adaptic with fluff gauze in the palm kerlex wrap and Ace bandage was performed.  There was no complication, needle and sponge count correct at the end of the case.    Electronically signed by Julio Kowalski MD on 1/16/2024 at 8:17 AM

## 2024-01-16 NOTE — ANESTHESIA POSTPROCEDURE EVALUATION
Department of Anesthesiology  Postprocedure Note    Patient: Wolf Daniels  MRN: [id number]  YOB: 1955  Date of evaluation: 1/16/2024    Procedure Summary       Date: 01/16/24 Room / Location: [address]    Anesthesia Start: 0654 Anesthesia Stop: 0815    Procedure: RIGHT RING AND SMALL FINGERS DUPUYTRENS RELEASE (Right: Hand) Diagnosis:       Dupuytren's disease of finger with contracture      (Dupuytren's disease of finger with contracture [M72.0])    Surgeons: Julio Kowalski MD Responsible Provider: Donya Soto MD    Anesthesia Type: general ASA Status: 3            Anesthesia Type: No value filed.    Ivory Phase I: Ivory Score: 10    Ivory Phase II: Ivory Score: 10    Anesthesia Post Evaluation    Patient location during evaluation: PACU  Patient participation: complete - patient participated  Level of consciousness: awake  Airway patency: patent  Nausea & Vomiting: no nausea and no vomiting  Cardiovascular status: blood pressure returned to baseline  Respiratory status: acceptable  Hydration status: euvolemic  Pain management: adequate    No notable events documented.  
Not Applicable

## 2024-01-16 NOTE — H&P
Current Outpatient Medications   Medication Sig Dispense Refill    simvastatin (ZOCOR) 20 MG tablet Take 1 tablet by mouth nightly 90 tablet 1    metoprolol tartrate (LOPRESSOR) 25 MG tablet Take 1 tablet by mouth 2 times daily 180 tablet 1    lisinopril (PRINIVIL;ZESTRIL) 10 MG tablet Take 2 tablets by mouth daily 180 tablet 1    hydroCHLOROthiazide (HYDRODIURIL) 12.5 MG tablet Take 1 tablet by mouth daily 90 tablet 1    apixaban (ELIQUIS) 5 MG TABS tablet Take 1 tablet by mouth 2 times daily 180 tablet 3    omeprazole (PRILOSEC) 20 MG delayed release capsule Take 1 capsule by mouth daily Pt takes 10mg daily        Multiple Vitamins-Minerals (THERAPEUTIC MULTIVITAMIN-MINERALS) tablet Take 1 tablet by mouth daily          No current facility-administered medications for this visit.      Social History            Socioeconomic History    Marital status:        Spouse name: Not on file    Number of children: Not on file    Years of education: Not on file    Highest education level: Not on file   Occupational History    Not on file   Tobacco Use    Smoking status: Former       Types: Cigarettes       Quit date: 3/30/2022       Years since quittin.3    Smokeless tobacco: Never    Tobacco comments:       states quit smoking \"40 yrs ago\"   Vaping Use    Vaping Use: Never used   Substance and Sexual Activity    Alcohol use: Yes       Comment: socially     Drug use: Not Currently    Sexual activity: Yes       Partners: Female   Other Topics Concern    Not on file   Social History Narrative    Not on file      Social Determinants of Health          Financial Resource Strain: Low Risk     Difficulty of Paying Living Expenses: Not hard at all   Food Insecurity: No Food Insecurity    Worried About Running Out of Food in the Last Year: Never true    Ran Out of Food in the Last Year: Never true   Transportation Needs: Unknown    Lack of Transportation (Medical): Not on file    Lack of Transportation  (Non-Medical): No   Physical Activity: Not on file   Stress: Not on file   Social Connections: Not on file   Intimate Partner Violence: Not on file   Housing Stability: Unknown    Unable to Pay for Housing in the Last Year: Not on file    Number of Places Lived in the Last Year: Not on file    Unstable Housing in the Last Year: No            Family History   Problem Relation Age of Onset    Coronary Art Dis Mother      Cancer Father        Review of systems is otherwise negative.  /68   Pulse 68   Resp 18   Ht 5' 8\" (1.727 m)   Wt 174 lb (78.9 kg)   BMI 26.46 kg/m²         Objective:   Physical Exam  Vitals and nursing note reviewed.   Constitutional:       Appearance: He is well-developed.   HENT:      Head: Normocephalic and atraumatic.   Eyes:      Conjunctiva/sclera: Conjunctivae normal.      Pupils: Pupils are equal, round, and reactive to light.   Cardiovascular:      Rate and Rhythm: Normal rate.   Pulmonary:      Effort: Pulmonary effort is normal. No respiratory distress.      Breath sounds: No wheezing.   Abdominal:      General: There is no distension.      Palpations: Abdomen is soft.   Musculoskeletal:         General: No tenderness. Normal range of motion.      Cervical back: Normal range of motion and neck supple.   Skin:     General: Skin is warm and dry.      Findings: No erythema or rash.   Neurological:      Mental Status: He is alert and oriented to person, place, and time.   Psychiatric:         Behavior: Behavior normal.         Thought Content: Thought content normal.   Right hand has pitting Dupuytren's in the distal palm on the left side of the ring and small finger affecting the MCP joint.     Assessment:   Dupuytren's left hand                Plan:   Excision Dupuytren's left hand                   The patient was evaluated and examined with my nurse in the room at all times.  Portions of this note were transcribed using Dragon voice recognition technology and as such may

## 2024-01-17 LAB — SURGICAL PATHOLOGY REPORT: NORMAL

## 2024-01-24 ENCOUNTER — OFFICE VISIT (OUTPATIENT)
Dept: PRIMARY CARE CLINIC | Age: 69
End: 2024-01-24

## 2024-01-24 VITALS
BODY MASS INDEX: 26.34 KG/M2 | HEART RATE: 76 BPM | DIASTOLIC BLOOD PRESSURE: 78 MMHG | HEIGHT: 68 IN | SYSTOLIC BLOOD PRESSURE: 136 MMHG | WEIGHT: 173.8 LBS | OXYGEN SATURATION: 97 %

## 2024-01-24 DIAGNOSIS — Z91.89 AT RISK FOR CARDIOVASCULAR EVENT: ICD-10-CM

## 2024-01-24 DIAGNOSIS — Z00.00 MEDICARE ANNUAL WELLNESS VISIT, SUBSEQUENT: Primary | ICD-10-CM

## 2024-01-24 DIAGNOSIS — C34.32 PRIMARY MALIGNANT NEOPLASM OF LEFT LOWER LOBE OF LUNG (HCC): ICD-10-CM

## 2024-01-24 DIAGNOSIS — E78.2 HYPERLIPIDEMIA, MIXED: ICD-10-CM

## 2024-01-24 DIAGNOSIS — I10 ESSENTIAL HYPERTENSION: ICD-10-CM

## 2024-01-24 DIAGNOSIS — I48.91 ATRIAL FIBRILLATION, UNSPECIFIED TYPE (HCC): ICD-10-CM

## 2024-01-24 DIAGNOSIS — Z79.01 ANTICOAGULATED: ICD-10-CM

## 2024-01-24 RX ORDER — LISINOPRIL 10 MG/1
20 TABLET ORAL DAILY
Qty: 200 TABLET | Refills: 1 | Status: SHIPPED | OUTPATIENT
Start: 2024-01-24 | End: 2024-08-11

## 2024-01-24 RX ORDER — HYDROCHLOROTHIAZIDE 12.5 MG/1
12.5 TABLET ORAL DAILY
Qty: 90 TABLET | Refills: 1 | Status: SHIPPED | OUTPATIENT
Start: 2024-01-24 | End: 2024-07-22

## 2024-01-24 RX ORDER — SIMVASTATIN 20 MG
20 TABLET ORAL NIGHTLY
Qty: 100 TABLET | Refills: 1 | Status: SHIPPED | OUTPATIENT
Start: 2024-01-24 | End: 2024-08-11

## 2024-01-24 ASSESSMENT — PATIENT HEALTH QUESTIONNAIRE - PHQ9
2. FEELING DOWN, DEPRESSED OR HOPELESS: 0
SUM OF ALL RESPONSES TO PHQ QUESTIONS 1-9: 0
1. LITTLE INTEREST OR PLEASURE IN DOING THINGS: 0
SUM OF ALL RESPONSES TO PHQ9 QUESTIONS 1 & 2: 0

## 2024-01-24 ASSESSMENT — LIFESTYLE VARIABLES
HOW MANY STANDARD DRINKS CONTAINING ALCOHOL DO YOU HAVE ON A TYPICAL DAY: 1 OR 2
HOW OFTEN DO YOU HAVE A DRINK CONTAINING ALCOHOL: 2-3 TIMES A WEEK

## 2024-01-24 NOTE — PROGRESS NOTES
Subjective:     Patient ID: Wolf Daniels is a 68 y.o. male    HPI  Patient returns for subsequent annual medicare visit. Medicare screens all reviewed and are normal. PMHx, FHx, SHx and Care Gaps all reviewed. Discussed anti-inflammatory lifestyle and packet of information provided      ASCVD risk: Explained risk of cardio cerebrovascular event.  Mitigate by optimizing blood pressure control, glycemic index, lipids.  Reinforced low glycemic index high-fiber diet adequate sleep appropriate exercise and positive attitude     Recently had Dupuytren's release by Dr. Darby and still has a bandage on his right hand when he is hoping to get removed later today at his appointment    Overall he feels well.  He does have some loss of hearing recommended that he gets a hearing test over at [facility].    Past medical history of lung cancer early-stage follows with oncology      Review of Systems   Constitutional:  Negative for activity change, appetite change, fatigue and fever.   HENT:  Negative for sore throat.    Eyes:  Negative for visual disturbance.   Respiratory:  Negative for cough, chest tightness and shortness of breath.    Cardiovascular:  Negative for chest pain, palpitations and leg swelling.   Gastrointestinal:  Negative for constipation and diarrhea.   Genitourinary:  Negative for dysuria and urgency.   Musculoskeletal:  Negative for back pain.   Neurological:  Negative for dizziness, syncope and headaches.   Hematological:  Does not bruise/bleed easily.   Psychiatric/Behavioral:  Negative for confusion and sleep disturbance. The patient is not nervous/anxious.            Objective:     Physical Exam  Vitals and nursing note reviewed.   Constitutional:       Appearance: Normal appearance.   HENT:      Head: Normocephalic.      Right Ear: External ear normal.      Left Ear: External ear normal.      Nose: Nose normal.      Mouth/Throat:      Mouth: Mucous membranes are moist.      Pharynx: Oropharynx is clear.

## 2024-01-25 ASSESSMENT — ENCOUNTER SYMPTOMS
CHEST TIGHTNESS: 0
COUGH: 0
SHORTNESS OF BREATH: 0
SORE THROAT: 0
BACK PAIN: 0
DIARRHEA: 0
CONSTIPATION: 0

## 2024-02-04 NOTE — BRIEF OP NOTE
Brief Postoperative Note      Patient: Tiera Delcid  YOB: 1955  MRN: [id number]    Date of Procedure: 5/23/2022    Pre-Op Diagnosis: PRIMARY CANCER OF LEFT LOWER LOBE    Post-Op Diagnosis: Same       Procedure(s):  XI ROBOTIC VIDEO ASSISTED THORACOSCOPY, LOWER LOBECTOMY    Surgeon(s):  Mihaela Solano MD    Assistant:  First Assistant: BERNARDO Ingram    Anesthesia: General    Estimated Blood Loss (mL): Minimal    Complications: None    Specimens:   ID Type Source Tests Collected by Time Destination   B : LEFT LOWER LOBE FOR MARGINS  Tissue Lung SURGICAL PATHOLOGY Mihaela Solano MD 5/23/2022 1205        Implants:  * No implants in log *      Drains:   Chest Tube Left 1 (Active)   Status Continuous Suction 05/23/22 1245   Suction -20 cm H2O 05/23/22 1245   Y Connector Used Yes 05/23/22 1245   Drainage Description Bright red 05/23/22 1245   Dressing Status Clean, dry & intact 05/23/22 1245   Surrounding Skin Clean, dry & intact 05/23/22 1245       [REMOVED] Urinary Catheter Beatty (Removed)       Findings: NA      Electronically signed by BERNARDO Ingram on 5/23/2022 at 1:16 PM
Home

## 2024-03-05 ENCOUNTER — HOSPITAL ENCOUNTER (OUTPATIENT)
Age: 69
Setting detail: SPECIMEN
Discharge: HOME | End: 2024-03-05

## 2024-03-05 DIAGNOSIS — E78.2 HYPERLIPIDEMIA, MIXED: ICD-10-CM

## 2024-03-05 DIAGNOSIS — I10 ESSENTIAL HYPERTENSION: ICD-10-CM

## 2024-03-05 LAB
BACTERIA URNS QL MICRO: NORMAL
BILIRUB UR QL STRIP: ABNORMAL
CASTS #/AREA URNS LPF: NORMAL /LPF (ref 0–8)
CHOLEST SERPL-MCNC: 218 MG/DL (ref 0–199)
CHOLESTEROL/HDL RATIO: 5
CLARITY UR: ABNORMAL
COLOR UR: ABNORMAL
EPI CELLS #/AREA URNS HPF: NORMAL /HPF (ref 0–5)
GLUCOSE UR STRIP-MCNC: NEGATIVE MG/DL
HDLC SERPL-MCNC: 40 MG/DL
HGB UR QL STRIP.AUTO: NEGATIVE
KETONES UR STRIP-MCNC: ABNORMAL MG/DL
LDLC SERPL CALC-MCNC: 147 MG/DL (ref 0–100)
LEUKOCYTE ESTERASE UR QL STRIP: NEGATIVE
NITRITE UR QL STRIP: NEGATIVE
PH UR STRIP: 5.5 [PH] (ref 5–8)
PROT UR STRIP-MCNC: ABNORMAL MG/DL
RBC #/AREA URNS HPF: NORMAL /HPF (ref 0–4)
SP GR UR STRIP: 1.03 (ref 1–1.03)
TRIGL SERPL-MCNC: 156 MG/DL
UROBILINOGEN UR STRIP-ACNC: NORMAL EU/DL (ref 0–1)
VLDLC SERPL CALC-MCNC: 31 MG/DL
WBC #/AREA URNS HPF: NORMAL /HPF (ref 0–5)

## 2024-04-02 DIAGNOSIS — I10 ESSENTIAL HYPERTENSION: ICD-10-CM

## 2024-04-03 RX ORDER — HYDROCHLOROTHIAZIDE 12.5 MG/1
12.5 TABLET ORAL DAILY
Qty: 90 TABLET | Refills: 0 | Status: SHIPPED | OUTPATIENT
Start: 2024-04-03

## 2024-05-16 ENCOUNTER — HOSPITAL ENCOUNTER (OUTPATIENT)
Dept: CT IMAGING | Age: 69
Discharge: HOME | End: 2024-05-18
Attending: RADIOLOGY
Payer: MEDICARE

## 2024-05-16 DIAGNOSIS — C34.32 PRIMARY MALIGNANT NEOPLASM OF LEFT LOWER LOBE OF LUNG (HCC): ICD-10-CM

## 2024-05-16 PROCEDURE — 71250 CT THORAX DX C-: CPT

## 2024-05-20 DIAGNOSIS — C34.32 PRIMARY MALIGNANT NEOPLASM OF LEFT LOWER LOBE OF LUNG (HCC): Primary | ICD-10-CM

## 2024-05-23 ENCOUNTER — TELEPHONE (OUTPATIENT)
Dept: ONCOLOGY | Age: 69
End: 2024-05-23

## 2024-05-23 ENCOUNTER — HOSPITAL ENCOUNTER (OUTPATIENT)
Age: 69
Discharge: HOME | End: 2024-05-23
Payer: MEDICARE

## 2024-05-23 ENCOUNTER — HOSPITAL ENCOUNTER (OUTPATIENT)
Dept: RADIATION ONCOLOGY | Age: 69
Discharge: HOME | End: 2024-05-23
Attending: RADIOLOGY
Payer: MEDICARE

## 2024-05-23 ENCOUNTER — OFFICE VISIT (OUTPATIENT)
Dept: ONCOLOGY | Age: 69
End: 2024-05-23
Payer: MEDICARE

## 2024-05-23 VITALS
WEIGHT: 168 LBS | HEART RATE: 83 BPM | OXYGEN SATURATION: 100 % | RESPIRATION RATE: 16 BRPM | TEMPERATURE: 96.9 F | DIASTOLIC BLOOD PRESSURE: 97 MMHG | BODY MASS INDEX: 25.54 KG/M2 | SYSTOLIC BLOOD PRESSURE: 129 MMHG

## 2024-05-23 VITALS
DIASTOLIC BLOOD PRESSURE: 97 MMHG | SYSTOLIC BLOOD PRESSURE: 129 MMHG | RESPIRATION RATE: 16 BRPM | OXYGEN SATURATION: 100 % | WEIGHT: 168.6 LBS | TEMPERATURE: 96.9 F | HEART RATE: 83 BPM | BODY MASS INDEX: 25.64 KG/M2

## 2024-05-23 DIAGNOSIS — C34.32 PRIMARY MALIGNANT NEOPLASM OF LEFT LOWER LOBE OF LUNG (HCC): Primary | ICD-10-CM

## 2024-05-23 DIAGNOSIS — C34.32 PRIMARY MALIGNANT NEOPLASM OF LEFT LOWER LOBE OF LUNG (HCC): ICD-10-CM

## 2024-05-23 DIAGNOSIS — C10.9 OROPHARYNGEAL CANCER (HCC): Primary | ICD-10-CM

## 2024-05-23 DIAGNOSIS — C10.9 OROPHARYNGEAL CANCER (HCC): ICD-10-CM

## 2024-05-23 LAB
ALBUMIN SERPL-MCNC: 4.9 G/DL (ref 3.5–5.2)
ALBUMIN/GLOB SERPL: 1.6 {RATIO} (ref 1–2.5)
ALP SERPL-CCNC: 67 U/L (ref 40–129)
ALT SERPL-CCNC: 17 U/L (ref 5–41)
ANION GAP SERPL CALCULATED.3IONS-SCNC: 11 MMOL/L (ref 9–17)
AST SERPL-CCNC: 31 U/L
BASOPHILS # BLD: 0 K/UL (ref 0–0.2)
BASOPHILS NFR BLD: 1 % (ref 0–2)
BILIRUB SERPL-MCNC: 1.3 MG/DL (ref 0.3–1.2)
BUN SERPL-MCNC: 17 MG/DL (ref 8–23)
CALCIUM SERPL-MCNC: 10.3 MG/DL (ref 8.6–10.4)
CHLORIDE SERPL-SCNC: 102 MMOL/L (ref 98–107)
CO2 SERPL-SCNC: 26 MMOL/L (ref 20–31)
CREAT SERPL-MCNC: 0.8 MG/DL (ref 0.7–1.2)
EOSINOPHIL # BLD: 0.1 K/UL (ref 0–0.4)
EOSINOPHILS RELATIVE PERCENT: 1 % (ref 1–4)
ERYTHROCYTE [DISTWIDTH] IN BLOOD BY AUTOMATED COUNT: 13.7 % (ref 12.5–15.4)
GFR, ESTIMATED: >90 ML/MIN/1.73M2
GLUCOSE SERPL-MCNC: 121 MG/DL (ref 70–99)
HCT VFR BLD AUTO: 45.9 % (ref 41–53)
HGB BLD-MCNC: 15.9 G/DL (ref 13.5–17.5)
LYMPHOCYTES NFR BLD: 1.2 K/UL (ref 1–4.8)
LYMPHOCYTES RELATIVE PERCENT: 28 % (ref 24–44)
MCH RBC QN AUTO: 31.3 PG (ref 26–34)
MCHC RBC AUTO-ENTMCNC: 34.6 G/DL (ref 31–37)
MCV RBC AUTO: 90.5 FL (ref 80–100)
MONOCYTES NFR BLD: 0.5 K/UL (ref 0.1–1.2)
MONOCYTES NFR BLD: 11 % (ref 2–11)
NEUTROPHILS NFR BLD: 59 % (ref 36–66)
NEUTS SEG NFR BLD: 2.6 K/UL (ref 1.8–7.7)
PLATELET # BLD AUTO: 317 K/UL (ref 140–450)
PMV BLD AUTO: 7.9 FL (ref 6–12)
POTASSIUM SERPL-SCNC: 4.9 MMOL/L (ref 3.7–5.3)
PROT SERPL-MCNC: 8 G/DL (ref 6.4–8.3)
RBC # BLD AUTO: 5.07 M/UL (ref 4.5–5.9)
SODIUM SERPL-SCNC: 139 MMOL/L (ref 135–144)
TSH SERPL DL<=0.05 MIU/L-ACNC: 2.78 UIU/ML (ref 0.3–5)
WBC OTHER # BLD: 4.4 K/UL (ref 3.5–11)

## 2024-05-23 PROCEDURE — 99212 OFFICE O/P EST SF 10 MIN: CPT | Performed by: RADIOLOGY

## 2024-05-23 PROCEDURE — 99211 OFF/OP EST MAY X REQ PHY/QHP: CPT | Performed by: INTERNAL MEDICINE

## 2024-05-23 PROCEDURE — 84443 ASSAY THYROID STIM HORMONE: CPT

## 2024-05-23 PROCEDURE — 1123F ACP DISCUSS/DSCN MKR DOCD: CPT | Performed by: INTERNAL MEDICINE

## 2024-05-23 PROCEDURE — 85025 COMPLETE CBC W/AUTO DIFF WBC: CPT

## 2024-05-23 PROCEDURE — 99214 OFFICE O/P EST MOD 30 MIN: CPT | Performed by: INTERNAL MEDICINE

## 2024-05-23 PROCEDURE — 36415 COLL VENOUS BLD VENIPUNCTURE: CPT

## 2024-05-23 PROCEDURE — 3080F DIAST BP >= 90 MM HG: CPT | Performed by: INTERNAL MEDICINE

## 2024-05-23 PROCEDURE — 80053 COMPREHEN METABOLIC PANEL: CPT

## 2024-05-23 PROCEDURE — 3074F SYST BP LT 130 MM HG: CPT | Performed by: INTERNAL MEDICINE

## 2024-05-23 ASSESSMENT — PAIN SCALES - GENERAL: PAINLEVEL_OUTOF10: 0

## 2024-05-23 NOTE — PROGRESS NOTES
follow up.  He states overall he is feeling well.  Dry mouth and diminished tastes continue-states he has 80% of his tastes.  He saw Dr. Tran last in March and plans to see him again in the fall for follow up.  Denies shortness of breath or unusual bothersome cough.  Dr. Madrigal reviewing CT Chest with patient.  Pt to return for follow up in 6 months with CT Chest/Neck prior.          Kimberly Manrique RN

## 2024-05-23 NOTE — TELEPHONE ENCOUNTER
RTC in 6 months with labs         Patient was seen today by Dr. Méndez and his labs done. Patient will return on 12/5 for a follow up and labs to be done when he returns.         Patient was given AVS and schedule

## 2024-05-23 NOTE — PROGRESS NOTES
PROCEDURES    LOBECTOMY Left 2022    robotic video assisted thoracoscoopy, lower lobectomy with lymphnode dissection    LUNG REMOVAL, TOTAL Left 2022    XI ROBOTIC VIDEO ASSISTED THORACOSCOPY, LOWER LOBECTOMY performed by José Hunter MD at [facility] [[email or web address]] OR    LYMPH NODE DISSECTION Left 2022    WRIST SURGERY Left 2022    LEFT SMALL FINGER  DUPUYTRENS REPAIR AND ULNAR BAND LEFT SMALL FINGER performed by Julio Kowalski MD at [facility] OR    WRIST SURGERY Right 2024    RIGHT RING AND SMALL FINGERS DUPUYTRENS RELEASE performed by Julio Kowalski MD at [facility] OR       Allergies   Allergen Reactions    Seasonal Other (See Comments)     Runny nose scratchy throat       Current Outpatient Medications   Medication Sig Dispense Refill    ELIQUIS 5 MG TABS tablet TAKE 1 TABLET BY MOUTH TWICE  DAILY 200 tablet 2    hydroCHLOROthiazide 12.5 MG tablet Take 1 tablet by mouth daily 90 tablet 0    simvastatin (ZOCOR) 20 MG tablet Take 1 tablet by mouth nightly 100 tablet 1    lisinopril (PRINIVIL;ZESTRIL) 10 MG tablet Take 2 tablets by mouth daily 200 tablet 1    omeprazole (PRILOSEC) 20 MG delayed release capsule Take 1 capsule by mouth daily Pt takes 10mg daily      Multiple Vitamins-Minerals (THERAPEUTIC MULTIVITAMIN-MINERALS) tablet Take 1 tablet by mouth daily       No current facility-administered medications for this visit.       Social History     Socioeconomic History    Marital status:      Spouse name: Not on file    Number of children: Not on file    Years of education: Not on file    Highest education level: Not on file   Occupational History    Not on file   Tobacco Use    Smoking status: Former     Current packs/day: 0.00     Types: Cigarettes     Quit date: 3/30/2022     Years since quittin.1    Smokeless tobacco: Never    Tobacco comments:     states quit smoking \"40 yrs ago\"   Vaping Use    Vaping Use: Never used   Substance and Sexual Activity

## 2024-05-31 ENCOUNTER — OFFICE VISIT (OUTPATIENT)
Dept: PRIMARY CARE CLINIC | Age: 69
End: 2024-05-31
Payer: MEDICARE

## 2024-05-31 VITALS
OXYGEN SATURATION: 99 % | BODY MASS INDEX: 26.07 KG/M2 | SYSTOLIC BLOOD PRESSURE: 124 MMHG | WEIGHT: 172 LBS | HEART RATE: 60 BPM | DIASTOLIC BLOOD PRESSURE: 86 MMHG | HEIGHT: 68 IN

## 2024-05-31 DIAGNOSIS — C34.32 PRIMARY MALIGNANT NEOPLASM OF LEFT LOWER LOBE OF LUNG (HCC): ICD-10-CM

## 2024-05-31 DIAGNOSIS — I10 ESSENTIAL HYPERTENSION: Primary | ICD-10-CM

## 2024-05-31 DIAGNOSIS — I48.91 ATRIAL FIBRILLATION, UNSPECIFIED TYPE (HCC): ICD-10-CM

## 2024-05-31 DIAGNOSIS — C10.9 OROPHARYNGEAL CANCER (HCC): ICD-10-CM

## 2024-05-31 DIAGNOSIS — E78.2 HYPERLIPIDEMIA, MIXED: ICD-10-CM

## 2024-05-31 DIAGNOSIS — D68.69 SECONDARY HYPERCOAGULABLE STATE (HCC): ICD-10-CM

## 2024-05-31 PROCEDURE — 99214 OFFICE O/P EST MOD 30 MIN: CPT | Performed by: STUDENT IN AN ORGANIZED HEALTH CARE EDUCATION/TRAINING PROGRAM

## 2024-05-31 PROCEDURE — 3074F SYST BP LT 130 MM HG: CPT | Performed by: STUDENT IN AN ORGANIZED HEALTH CARE EDUCATION/TRAINING PROGRAM

## 2024-05-31 PROCEDURE — 1123F ACP DISCUSS/DSCN MKR DOCD: CPT | Performed by: STUDENT IN AN ORGANIZED HEALTH CARE EDUCATION/TRAINING PROGRAM

## 2024-05-31 PROCEDURE — 3079F DIAST BP 80-89 MM HG: CPT | Performed by: STUDENT IN AN ORGANIZED HEALTH CARE EDUCATION/TRAINING PROGRAM

## 2024-05-31 RX ORDER — SIMVASTATIN 40 MG
40 TABLET ORAL NIGHTLY
Qty: 90 TABLET | Refills: 1 | Status: SHIPPED | OUTPATIENT
Start: 2024-05-31

## 2024-05-31 NOTE — PROGRESS NOTES
[facility]      Date of Visit:  2024  Patient Name: Wolf Daniels   Patient :  1955     CHIEF COMPLAINT:     Wolf Daniels is a 69 y.o. male who presents today to be evaluated for the following condition(s):  Chief Complaint   Patient presents with    New Patient     Est with provider - no concerns, CT and labs done w Dr. Madrigal and Dr. Méndez    Last PCP - Dr. Baptiste  LOV - 24  Last labs - 24 ED  Specialists - Dr. Madrigal, Dr. Méndez, Dr. Tran, Dr. Mott  HM - colonoscopy    Hypertension     Pt is taking Lisinopril and HCTZ daily - pt does not check his BP at home. No concerns. Denies chest pain, SOB, dizziness, edema.        HISTORY OF PRESENT ILLNESS:      HPI     REVIEW OF SYSTEMS:     Review of Systems   Constitutional:  Negative for chills, fatigue and fever.   Respiratory:  Negative for cough, shortness of breath and wheezing.    Cardiovascular:  Negative for chest pain, palpitations and leg swelling.   Gastrointestinal:  Negative for abdominal pain, blood in stool, constipation, diarrhea, nausea and vomiting.   Neurological:  Negative for dizziness, weakness, light-headedness, numbness and headaches.        REVIEWED INFORMATION:      Current Outpatient Medications on File Prior to Visit   Medication Sig Dispense Refill    ELIQUIS 5 MG TABS tablet TAKE 1 TABLET BY MOUTH TWICE  DAILY 200 tablet 2    hydroCHLOROthiazide 12.5 MG tablet Take 1 tablet by mouth daily 90 tablet 0    simvastatin (ZOCOR) 20 MG tablet Take 1 tablet by mouth nightly 100 tablet 1    lisinopril (PRINIVIL;ZESTRIL) 10 MG tablet Take 2 tablets by mouth daily 200 tablet 1    omeprazole (PRILOSEC) 20 MG delayed release capsule Take 1 capsule by mouth daily Pt takes 10mg daily      Multiple Vitamins-Minerals (THERAPEUTIC MULTIVITAMIN-MINERALS) tablet Take 1 tablet by mouth daily       No current facility-administered medications on file prior to visit.       Allergies   Allergen Reactions    Seasonal Other (See

## 2024-06-13 DIAGNOSIS — I10 ESSENTIAL HYPERTENSION: ICD-10-CM

## 2024-06-13 RX ORDER — HYDROCHLOROTHIAZIDE 12.5 MG/1
12.5 TABLET ORAL DAILY
Qty: 90 TABLET | Refills: 3 | Status: SHIPPED | OUTPATIENT
Start: 2024-06-13

## 2024-06-13 NOTE — TELEPHONE ENCOUNTER
Requesting 1 year supply      Wolf Daniels is requesting a refill on the following medication(s):  Requested Prescriptions     Pending Prescriptions Disp Refills    hydroCHLOROthiazide 12.5 MG tablet [Pharmacy Med Name: hydroCHLOROthiazide 12.5 MG Oral Tablet] 90 tablet 3     Sig: TAKE 1 TABLET BY MOUTH DAILY       Last Visit Date (If Applicable):  5/31/2024    Next Visit Date:    12/2/2024

## 2024-06-17 ASSESSMENT — ENCOUNTER SYMPTOMS
WHEEZING: 0
ABDOMINAL PAIN: 0
SHORTNESS OF BREATH: 0
DIARRHEA: 0
COUGH: 0
VOMITING: 0
CONSTIPATION: 0
NAUSEA: 0
BLOOD IN STOOL: 0

## 2024-08-04 DIAGNOSIS — E78.2 HYPERLIPIDEMIA, MIXED: ICD-10-CM

## 2024-08-05 RX ORDER — SIMVASTATIN 40 MG
40 TABLET ORAL NIGHTLY
Qty: 100 TABLET | Refills: 2 | Status: SHIPPED | OUTPATIENT
Start: 2024-08-05

## 2024-08-16 DIAGNOSIS — I10 ESSENTIAL HYPERTENSION: ICD-10-CM

## 2024-08-16 RX ORDER — LISINOPRIL 20 MG/1
20 TABLET ORAL DAILY
Qty: 100 TABLET | Refills: 1 | Status: SHIPPED | OUTPATIENT
Start: 2024-08-16 | End: 2025-03-04

## 2024-11-19 ENCOUNTER — HOSPITAL ENCOUNTER (OUTPATIENT)
Age: 69
Setting detail: SPECIMEN
Discharge: HOME | End: 2024-11-19

## 2024-11-19 DIAGNOSIS — C34.32 PRIMARY MALIGNANT NEOPLASM OF LEFT LOWER LOBE OF LUNG (HCC): ICD-10-CM

## 2024-11-19 DIAGNOSIS — E78.2 HYPERLIPIDEMIA, MIXED: ICD-10-CM

## 2024-11-19 LAB
ALBUMIN SERPL-MCNC: 4.8 G/DL (ref 3.5–5.2)
ALBUMIN/GLOB SERPL: 1.8 {RATIO} (ref 1–2.5)
ALP SERPL-CCNC: 70 U/L (ref 40–129)
ALT SERPL-CCNC: 17 U/L (ref 10–50)
ANION GAP SERPL CALCULATED.3IONS-SCNC: 11 MMOL/L (ref 9–16)
AST SERPL-CCNC: 23 U/L (ref 10–50)
BASOPHILS # BLD: <0.03 K/UL (ref 0–0.2)
BASOPHILS NFR BLD: 0 % (ref 0–2)
BILIRUB SERPL-MCNC: 1.4 MG/DL (ref 0–1.2)
BUN SERPL-MCNC: 14 MG/DL (ref 8–23)
CALCIUM SERPL-MCNC: 10.3 MG/DL (ref 8.6–10.4)
CHLORIDE SERPL-SCNC: 98 MMOL/L (ref 98–107)
CHOLEST SERPL-MCNC: 199 MG/DL (ref 0–199)
CHOLESTEROL/HDL RATIO: 4.3
CO2 SERPL-SCNC: 28 MMOL/L (ref 20–31)
CREAT SERPL-MCNC: 1 MG/DL (ref 0.7–1.2)
EOSINOPHIL # BLD: 0.08 K/UL (ref 0–0.44)
EOSINOPHILS RELATIVE PERCENT: 2 % (ref 1–4)
ERYTHROCYTE [DISTWIDTH] IN BLOOD BY AUTOMATED COUNT: 13 % (ref 11.8–14.4)
GFR, ESTIMATED: 81 ML/MIN/1.73M2
GLUCOSE SERPL-MCNC: 102 MG/DL (ref 74–99)
HCT VFR BLD AUTO: 48.1 % (ref 40.7–50.3)
HDLC SERPL-MCNC: 46 MG/DL
HGB BLD-MCNC: 15.9 G/DL (ref 13–17)
IMM GRANULOCYTES # BLD AUTO: 0.03 K/UL (ref 0–0.3)
IMM GRANULOCYTES NFR BLD: 1 %
LDLC SERPL CALC-MCNC: 129 MG/DL (ref 0–100)
LYMPHOCYTES NFR BLD: 1.18 K/UL (ref 1.1–3.7)
LYMPHOCYTES RELATIVE PERCENT: 22 % (ref 24–43)
MCH RBC QN AUTO: 30.4 PG (ref 25.2–33.5)
MCHC RBC AUTO-ENTMCNC: 33.1 G/DL (ref 28.4–34.8)
MCV RBC AUTO: 92 FL (ref 82.6–102.9)
MONOCYTES NFR BLD: 0.58 K/UL (ref 0.1–1.2)
MONOCYTES NFR BLD: 11 % (ref 3–12)
NEUTROPHILS NFR BLD: 64 % (ref 36–65)
NEUTS SEG NFR BLD: 3.47 K/UL (ref 1.5–8.1)
NRBC BLD-RTO: 0 PER 100 WBC
PLATELET # BLD AUTO: 338 K/UL (ref 138–453)
PMV BLD AUTO: 9.7 FL (ref 8.1–13.5)
POTASSIUM SERPL-SCNC: 3.9 MMOL/L (ref 3.7–5.3)
PROT SERPL-MCNC: 7.4 G/DL (ref 6.6–8.7)
RBC # BLD AUTO: 5.23 M/UL (ref 4.21–5.77)
SODIUM SERPL-SCNC: 137 MMOL/L (ref 136–145)
TRIGL SERPL-MCNC: 122 MG/DL
VLDLC SERPL CALC-MCNC: 24 MG/DL (ref 1–30)
WBC OTHER # BLD: 5.4 K/UL (ref 3.5–11.3)

## 2024-11-25 ENCOUNTER — HOSPITAL ENCOUNTER (OUTPATIENT)
Dept: CT IMAGING | Age: 69
Discharge: HOME | End: 2024-11-27
Attending: RADIOLOGY
Payer: MEDICARE

## 2024-11-25 DIAGNOSIS — C10.9 OROPHARYNGEAL CANCER (HCC): ICD-10-CM

## 2024-11-25 DIAGNOSIS — C34.32 PRIMARY MALIGNANT NEOPLASM OF LEFT LOWER LOBE OF LUNG (HCC): ICD-10-CM

## 2024-11-25 PROCEDURE — 2580000003 HC RX 258: Performed by: RADIOLOGY

## 2024-11-25 PROCEDURE — 71250 CT THORAX DX C-: CPT

## 2024-11-25 PROCEDURE — 6360000004 HC RX CONTRAST MEDICATION: Performed by: RADIOLOGY

## 2024-11-25 PROCEDURE — 70491 CT SOFT TISSUE NECK W/DYE: CPT

## 2024-11-25 RX ORDER — 0.9 % SODIUM CHLORIDE 0.9 %
80 INTRAVENOUS SOLUTION INTRAVENOUS ONCE
Status: COMPLETED | OUTPATIENT
Start: 2024-11-25 | End: 2024-11-25

## 2024-11-25 RX ORDER — SODIUM CHLORIDE 0.9 % (FLUSH) 0.9 %
10 SYRINGE (ML) INJECTION PRN
Status: DISCONTINUED | OUTPATIENT
Start: 2024-11-25 | End: 2024-11-28 | Stop reason: HOSPADM

## 2024-11-25 RX ORDER — IOPAMIDOL 755 MG/ML
75 INJECTION, SOLUTION INTRAVASCULAR
Status: COMPLETED | OUTPATIENT
Start: 2024-11-25 | End: 2024-11-25

## 2024-11-25 RX ADMIN — SODIUM CHLORIDE 80 ML: 9 INJECTION, SOLUTION INTRAVENOUS at 09:47

## 2024-11-25 RX ADMIN — IOPAMIDOL 75 ML: 755 INJECTION, SOLUTION INTRAVENOUS at 09:47

## 2024-11-25 RX ADMIN — SODIUM CHLORIDE, PRESERVATIVE FREE 10 ML: 5 INJECTION INTRAVENOUS at 09:47

## 2024-12-02 ENCOUNTER — OFFICE VISIT (OUTPATIENT)
Dept: PRIMARY CARE CLINIC | Age: 69
End: 2024-12-02

## 2024-12-02 VITALS
WEIGHT: 180 LBS | SYSTOLIC BLOOD PRESSURE: 108 MMHG | HEART RATE: 76 BPM | OXYGEN SATURATION: 99 % | HEIGHT: 68 IN | DIASTOLIC BLOOD PRESSURE: 66 MMHG | BODY MASS INDEX: 27.28 KG/M2

## 2024-12-02 DIAGNOSIS — I10 ESSENTIAL HYPERTENSION: Chronic | ICD-10-CM

## 2024-12-02 DIAGNOSIS — I48.0 PAROXYSMAL ATRIAL FIBRILLATION (HCC): Primary | Chronic | ICD-10-CM

## 2024-12-02 DIAGNOSIS — E78.2 HYPERLIPIDEMIA, MIXED: Chronic | ICD-10-CM

## 2024-12-02 PROBLEM — C10.9 SQUAMOUS CELL CARCINOMA OF OROPHARYNX (HCC): Status: ACTIVE | Noted: 2021-10-21

## 2024-12-02 SDOH — ECONOMIC STABILITY: FOOD INSECURITY: WITHIN THE PAST 12 MONTHS, THE FOOD YOU BOUGHT JUST DIDN'T LAST AND YOU DIDN'T HAVE MONEY TO GET MORE.: NEVER TRUE

## 2024-12-02 SDOH — ECONOMIC STABILITY: FOOD INSECURITY: WITHIN THE PAST 12 MONTHS, YOU WORRIED THAT YOUR FOOD WOULD RUN OUT BEFORE YOU GOT MONEY TO BUY MORE.: NEVER TRUE

## 2024-12-02 SDOH — ECONOMIC STABILITY: INCOME INSECURITY: HOW HARD IS IT FOR YOU TO PAY FOR THE VERY BASICS LIKE FOOD, HOUSING, MEDICAL CARE, AND HEATING?: NOT HARD AT ALL

## 2024-12-02 NOTE — ASSESSMENT & PLAN NOTE
Chronic, at goal (stable), reasonably well controlled; discussed intensifying statin therapy for LDL goal of <100 as patient has never been on a more potent statin.  He just received a 90 day supply from his mail order pharmacy; will consider change to atorvastatin or rosuvastatin when next due for refill.  If changing statin, will repeat labs in 1-2 month to recheck lipids, CMP.

## 2024-12-02 NOTE — PROGRESS NOTES
If changing statin, will repeat labs in 1-2 month to recheck lipids, CMP.                There are no discontinued medications.      Return in about 6 months (around 6/2/2025) for HTN, HLD, Afib.    Patient was encouraged to call the office with any worsening/changing symptoms or any other concerns prior to scheduled follow up.    All questions were answered to the patient's satisfaction.  Patient verbalized understanding and agreement with the plan.        Any history taking or documentation performed by MA has been independently reviewed and confirmed by myself.    Electronically signed by Bj Martines DO on 12/2/2024 at 11:54 AM

## 2024-12-02 NOTE — ASSESSMENT & PLAN NOTE
Chronic, at goal (stable), continue lisinopril, HCTZ, metoprolol for now; consider discontinuing HCTZ if need to titrate metoprolol and hypotension becomes an issue.

## 2024-12-02 NOTE — ASSESSMENT & PLAN NOTE
Chronic, at goal (stable), HR remains high normal at times but has only been taking Toprol XL for 1-2 weeks; patient seems to be persistently in afib today in office although we did not repeat an EKG today.  Discussed rate vs rhythm control; patient is asymptomatic without history of CAD, MI or heart failure.  Will forward copy of office note to Dr. Mott to review and determine if patient would benefit from rhythm as opposed to rate control.  With it being Cyber Monday, discussed considering purchasing a [employer] device to screen for afib as patient is usually unaware of whether or not he is in atrial fibrillation; although a strong recommendation to do so was not made today.  Advised patient to continue to monitor BP, HR and follow up with cardiology in next 1-2 weeks to consider titrating metoprolol as needed; avoided doing so today as patient just recently started and has low normal BP today.  Continue metoprolol, apixiban as prescribed for now.

## 2024-12-05 ENCOUNTER — OFFICE VISIT (OUTPATIENT)
Dept: ONCOLOGY | Age: 69
End: 2024-12-05
Payer: MEDICARE

## 2024-12-05 ENCOUNTER — TELEPHONE (OUTPATIENT)
Dept: ONCOLOGY | Age: 69
End: 2024-12-05

## 2024-12-05 ENCOUNTER — HOSPITAL ENCOUNTER (OUTPATIENT)
Dept: RADIATION ONCOLOGY | Age: 69
Discharge: HOME | End: 2024-12-05
Attending: RADIOLOGY
Payer: MEDICARE

## 2024-12-05 VITALS
RESPIRATION RATE: 16 BRPM | BODY MASS INDEX: 26.79 KG/M2 | SYSTOLIC BLOOD PRESSURE: 154 MMHG | TEMPERATURE: 97.1 F | WEIGHT: 176.2 LBS | HEART RATE: 87 BPM | DIASTOLIC BLOOD PRESSURE: 85 MMHG

## 2024-12-05 VITALS
BODY MASS INDEX: 26.67 KG/M2 | SYSTOLIC BLOOD PRESSURE: 145 MMHG | DIASTOLIC BLOOD PRESSURE: 81 MMHG | HEART RATE: 78 BPM | WEIGHT: 175.4 LBS | OXYGEN SATURATION: 99 %

## 2024-12-05 DIAGNOSIS — C34.32 PRIMARY MALIGNANT NEOPLASM OF LEFT LOWER LOBE OF LUNG (HCC): ICD-10-CM

## 2024-12-05 DIAGNOSIS — C10.9 OROPHARYNGEAL CANCER (HCC): Primary | ICD-10-CM

## 2024-12-05 DIAGNOSIS — C10.9 SQUAMOUS CELL CARCINOMA OF OROPHARYNX (HCC): Primary | ICD-10-CM

## 2024-12-05 DIAGNOSIS — E04.1 THYROID NODULE: ICD-10-CM

## 2024-12-05 PROCEDURE — 1160F RVW MEDS BY RX/DR IN RCRD: CPT | Performed by: INTERNAL MEDICINE

## 2024-12-05 PROCEDURE — 99214 OFFICE O/P EST MOD 30 MIN: CPT | Performed by: INTERNAL MEDICINE

## 2024-12-05 PROCEDURE — 1123F ACP DISCUSS/DSCN MKR DOCD: CPT | Performed by: INTERNAL MEDICINE

## 2024-12-05 PROCEDURE — 99212 OFFICE O/P EST SF 10 MIN: CPT | Performed by: RADIOLOGY

## 2024-12-05 PROCEDURE — 3079F DIAST BP 80-89 MM HG: CPT | Performed by: INTERNAL MEDICINE

## 2024-12-05 PROCEDURE — 3077F SYST BP >= 140 MM HG: CPT | Performed by: INTERNAL MEDICINE

## 2024-12-05 PROCEDURE — 1159F MED LIST DOCD IN RCRD: CPT | Performed by: INTERNAL MEDICINE

## 2024-12-05 ASSESSMENT — PAIN SCALES - GENERAL: PAINLEVEL_OUTOF10: 0

## 2024-12-05 NOTE — PROGRESS NOTES
CLASSIFICATION:     pT2a  pN0     IMAGING DATA:    PET CT SKULL BASE TO MID THIGH  Narrative: EXAMINATION:  WHOLE BODY PET/CT  10/6/2021    TECHNIQUE:  Following IV injection of 11.4 mCi of F 18 -FDG, PET  tumor imaging was  acquired from the base of the skull to the mid thighs.  Computed tomography  was used for purposes of attenuation correction and anatomic localization.  Fusion imaging was utilized for interpretation.    Uptake time 59 mins.  Glucose level 105 mg/dl.    COMPARISON:  CT neck 09/22/2021    HISTORY:  Reason for Exam: Malignant neoplasm of tonsillar fossa  Acuity: Acute  Type of Exam: Initial    FINDINGS:  HEAD/NECK: Redemonstration of left palatine tonsillar lesion which is FDG  avid with max SUV of 12.0.  No metabolically active cervical lymphadenopathy.  8 mm posterior right thyroid hypodense nodule which is FDG avid with max SUV  of 6.0.    CHEST: Left retrocardiac lower lobe nodular area of opacification measuring 2  cm which demonstrates faint uptake with max SUV of 2.3.  No metabolically  active axillary, hilar, or mediastinal lymphadenopathy.    ABDOMEN/PELVIS: No metabolically active intraperitoneal mass.  No  metabolically active abdominal or pelvic lymphadenopathy.  Physiologic  activity in the gastrointestinal and genitourinary systems.    BONES/SOFT TISSUE: No abnormal FDG activity localizes to the bones.  No  aggressive osseous lesion.    INCIDENTAL CT FINDINGS: Multivessel coronary artery calcification.  Aortic  atherosclerosis most pronounced in the infrarenal segment.  Sigmoid  diverticulosis.  Impression: 1. Redemonstration of left palatine tonsillar lesion which is FDG avid  consistent with history of malignancy.  2. Posterior right thyroid hypodense nodule which is FDG avid, recommend  further evaluation with dedicated ultrasound.  3. Left retrocardiac lower lobe nodular area of opacification as measured  above which demonstrates faint uptake and is favored to represent

## 2024-12-05 NOTE — TELEPHONE ENCOUNTER
Instructions   from Dr. Charles Méndez MD    RTc in 6 months w labs and US thyroid (and Ct chest ordered by rad onc)    Scheduled US of thyroid on 6/9/2025 @9:30 at [place]  Rv scheduled on 6/24/2025 @8am; gave pt their avs

## 2024-12-05 NOTE — PROGRESS NOTES
Wolf Daniels  12/5/2024  9:42 AM      Vitals:    12/05/24 0930   BP: (!) 154/85   Pulse: 87   Resp: 16   Temp: 97.1 °F (36.2 °C)    :        Pain Level: 0       Wt Readings from Last 1 Encounters:   12/05/24 79.9 kg (176 lb 3.2 oz)                Current Outpatient Medications:     metoprolol succinate (TOPROL XL) 25 MG extended release tablet, Take 1 tablet by mouth daily, Disp: 30 tablet, Rfl: 3    lisinopril (PRINIVIL;ZESTRIL) 20 MG tablet, Take 1 tablet by mouth daily, Disp: 100 tablet, Rfl: 1    simvastatin (ZOCOR) 40 MG tablet, TAKE 1 TABLET BY MOUTH EVERY  NIGHT, Disp: 100 tablet, Rfl: 2    hydroCHLOROthiazide 12.5 MG tablet, TAKE 1 TABLET BY MOUTH DAILY, Disp: 90 tablet, Rfl: 3    ELIQUIS 5 MG TABS tablet, TAKE 1 TABLET BY MOUTH TWICE  DAILY, Disp: 200 tablet, Rfl: 2    omeprazole (PRILOSEC) 20 MG delayed release capsule, Take 1 capsule by mouth daily Pt takes 10mg daily, Disp: , Rfl:     Multiple Vitamins-Minerals (THERAPEUTIC MULTIVITAMIN-MINERALS) tablet, Take 1 tablet by mouth daily, Disp: , Rfl:                FALLS RISK SCREEN  Instructions:  Assess the patient and enter the appropriate indicators that are present for fall risk identification.   Total the numbers entered and assign a fall risk score from Table 2.  Reassess patient at a minimum every 12 weeks or with status change.    Assessment   Date  12/5/2024     1.  Mental Ability: confusion/cognitively impaired 0     2.  Elimination Issues: incontinence, frequency 0       3.  Ambulatory: use of assistive devices (walker, cane, off-loading devices),        attached to equipment (IV pole, oxygen) 0     4.  Sensory Limitations: dizziness, vertigo, impaired vision 0     5.  Age less than 65        0     6.  Age 65 or greater 1     7.  Medication: diuretics, strong analgesics, hypnotics, sedatives,        antihypertensive agents 3   8.  Falls:  recent history of falls within the last 3 months (not to include slipping or        tripping) 0   TOTAL

## 2024-12-13 ENCOUNTER — HOSPITAL ENCOUNTER (OUTPATIENT)
Age: 69
Setting detail: SPECIMEN
Discharge: HOME | End: 2024-12-13

## 2025-01-12 DIAGNOSIS — I10 ESSENTIAL HYPERTENSION: ICD-10-CM

## 2025-01-13 RX ORDER — LISINOPRIL 20 MG/1
20 TABLET ORAL DAILY
Qty: 100 TABLET | Refills: 2 | Status: SHIPPED | OUTPATIENT
Start: 2025-01-13

## 2025-01-13 NOTE — TELEPHONE ENCOUNTER
Wolf Daniels is requesting a refill on the following medication(s):  Requested Prescriptions     Pending Prescriptions Disp Refills    lisinopril (PRINIVIL;ZESTRIL) 20 MG tablet [Pharmacy Med Name: Lisinopril 20 MG Oral Tablet] 100 tablet 2     Sig: TAKE 1 TABLET BY MOUTH DAILY       Last Visit Date (If Applicable):  12/2/2024    Next Visit Date:    6/2/2025

## 2025-01-17 LAB
NTRA SIGNATERA MTM READOUT: 0 MTM/ML
NTRA SIGNATERA TEST RESULT: NEGATIVE

## 2025-01-20 ENCOUNTER — HOSPITAL ENCOUNTER (OUTPATIENT)
Age: 70
Setting detail: SPECIMEN
Discharge: HOME | End: 2025-01-20

## 2025-01-20 LAB — SURGICAL PATHOLOGY REPORT: NORMAL

## 2025-03-12 DIAGNOSIS — I10 ESSENTIAL HYPERTENSION: ICD-10-CM

## 2025-03-12 NOTE — TELEPHONE ENCOUNTER
Wolf Daniels is requesting a refill on the following medication(s):  Requested Prescriptions     Pending Prescriptions Disp Refills    hydroCHLOROthiazide 12.5 MG tablet [Pharmacy Med Name: hydroCHLOROthiazide 12.5 MG Oral Tablet] 100 tablet 2     Sig: TAKE 1 TABLET BY MOUTH DAILY       Last Visit Date (If Applicable):  12/2/2024    Next Visit Date:    6/2/2025

## 2025-03-13 RX ORDER — HYDROCHLOROTHIAZIDE 12.5 MG/1
12.5 TABLET ORAL DAILY
Qty: 100 TABLET | Refills: 2 | Status: SHIPPED | OUTPATIENT
Start: 2025-03-13

## 2025-04-14 DIAGNOSIS — E78.2 HYPERLIPIDEMIA, MIXED: ICD-10-CM

## 2025-04-15 RX ORDER — SIMVASTATIN 40 MG
40 TABLET ORAL NIGHTLY
Qty: 100 TABLET | Refills: 0 | Status: SHIPPED | OUTPATIENT
Start: 2025-04-15

## 2025-04-15 NOTE — TELEPHONE ENCOUNTER
Wolf Daniels is requesting a refill on the following medication(s):  Requested Prescriptions     Pending Prescriptions Disp Refills    simvastatin (ZOCOR) 40 MG tablet [Pharmacy Med Name: Simvastatin 40 MG Oral Tablet] 100 tablet 2     Sig: TAKE 1 TABLET BY MOUTH EVERY  NIGHT       Last Visit Date (If Applicable):  12/2/2024    Next Visit Date:    6/2/2025

## 2025-06-02 ENCOUNTER — OFFICE VISIT (OUTPATIENT)
Dept: PRIMARY CARE CLINIC | Age: 70
End: 2025-06-02
Payer: MEDICARE

## 2025-06-02 VITALS
HEART RATE: 57 BPM | WEIGHT: 179 LBS | SYSTOLIC BLOOD PRESSURE: 134 MMHG | DIASTOLIC BLOOD PRESSURE: 84 MMHG | OXYGEN SATURATION: 98 % | BODY MASS INDEX: 27.13 KG/M2 | HEIGHT: 68 IN

## 2025-06-02 DIAGNOSIS — I10 ESSENTIAL HYPERTENSION: ICD-10-CM

## 2025-06-02 DIAGNOSIS — I48.0 PAROXYSMAL ATRIAL FIBRILLATION (HCC): ICD-10-CM

## 2025-06-02 DIAGNOSIS — E78.2 HYPERLIPIDEMIA, MIXED: Primary | ICD-10-CM

## 2025-06-02 DIAGNOSIS — C34.32 PRIMARY MALIGNANT NEOPLASM OF LEFT LOWER LOBE OF LUNG (HCC): ICD-10-CM

## 2025-06-02 PROCEDURE — 1160F RVW MEDS BY RX/DR IN RCRD: CPT | Performed by: STUDENT IN AN ORGANIZED HEALTH CARE EDUCATION/TRAINING PROGRAM

## 2025-06-02 PROCEDURE — 3079F DIAST BP 80-89 MM HG: CPT | Performed by: STUDENT IN AN ORGANIZED HEALTH CARE EDUCATION/TRAINING PROGRAM

## 2025-06-02 PROCEDURE — 3075F SYST BP GE 130 - 139MM HG: CPT | Performed by: STUDENT IN AN ORGANIZED HEALTH CARE EDUCATION/TRAINING PROGRAM

## 2025-06-02 PROCEDURE — 1159F MED LIST DOCD IN RCRD: CPT | Performed by: STUDENT IN AN ORGANIZED HEALTH CARE EDUCATION/TRAINING PROGRAM

## 2025-06-02 PROCEDURE — 1123F ACP DISCUSS/DSCN MKR DOCD: CPT | Performed by: STUDENT IN AN ORGANIZED HEALTH CARE EDUCATION/TRAINING PROGRAM

## 2025-06-02 PROCEDURE — 99214 OFFICE O/P EST MOD 30 MIN: CPT | Performed by: STUDENT IN AN ORGANIZED HEALTH CARE EDUCATION/TRAINING PROGRAM

## 2025-06-02 RX ORDER — ROSUVASTATIN CALCIUM 20 MG/1
20 TABLET, COATED ORAL DAILY
Qty: 100 TABLET | Refills: 3 | Status: SHIPPED | OUTPATIENT
Start: 2025-06-02

## 2025-06-02 SDOH — ECONOMIC STABILITY: FOOD INSECURITY: WITHIN THE PAST 12 MONTHS, THE FOOD YOU BOUGHT JUST DIDN'T LAST AND YOU DIDN'T HAVE MONEY TO GET MORE.: NEVER TRUE

## 2025-06-02 SDOH — ECONOMIC STABILITY: FOOD INSECURITY: WITHIN THE PAST 12 MONTHS, YOU WORRIED THAT YOUR FOOD WOULD RUN OUT BEFORE YOU GOT MONEY TO BUY MORE.: NEVER TRUE

## 2025-06-02 ASSESSMENT — PATIENT HEALTH QUESTIONNAIRE - PHQ9
SUM OF ALL RESPONSES TO PHQ QUESTIONS 1-9: 0
2. FEELING DOWN, DEPRESSED OR HOPELESS: NOT AT ALL
SUM OF ALL RESPONSES TO PHQ QUESTIONS 1-9: 0
1. LITTLE INTEREST OR PLEASURE IN DOING THINGS: NOT AT ALL

## 2025-06-02 NOTE — PROGRESS NOTES
[facility]  [facility]  [address] [address]  Dept: [phone]       Date of Visit:  2025  Patient Name: Wolf Daniels   Patient :  1955     CHIEF COMPLAINT:     Wolf Daniels is a 70 y.o. male who presents today to be evaluated for the following condition(s):  Chief Complaint   Patient presents with    Hypertension     Lisinopril, Metoprolol, HCTZ daily. Pt does not track his BP at home, regularly - he has some higher readings, but cardio is only concerned if HR gets under 65. No concerns. Pt denies chest pain, SOB, HA, dizziness, LE edema.     Cholesterol Problem     Simvastatin daily. No concerns. Lipids were last checked on 24.     Other     ADY OK       HISTORY OF PRESENT ILLNESS:      History of Present Illness  The patient presents for evaluation of hyperlipidemia and cardiac issues.    He reports a general sense of well-being with no specific complaints. He is currently undergoing biannual cancer screenings, with the next one scheduled in 2 weeks. He had a consultation with Dr. Mansfield in 2025, during which his condition was deemed satisfactory, leading to a reduction in the frequency of his visits to once a year. He experiences persistent pain during swallowing, a symptom he has adapted to over time. He also reports difficulty in swallowing certain foods but manages this by maintaining hydration. He does not experience any respiratory symptoms such as coughing, wheezing, or shortness of breath.    His cardiologist has advised him to monitor his heart rate, which he believes is within the normal range. He has been prescribed metoprolol for heart rate control. He noticed on his last couple of CT scans that he had an enlarged heart, which he questioned could be related to the COVID-19 vaccine. However, his doctor clarified that the vaccine typically does not cause an enlarged heart, and his last echocardiogram showed a normal heart

## 2025-06-02 NOTE — ASSESSMENT & PLAN NOTE
Chronic, not at goal (unstable),    - LDL levels have consistently exceeded 100, with the most recent reading being 129 in November 2024.  - A prescription for rosuvastatin has been issued to [facility]. He is advised to continue his current regimen of simvastatin until the rosuvastatin is available, at which point he should transition to the new medication.  - The potential side effects of statins, including muscle aches or discomfort, were discussed. He is encouraged to maintain a healthy diet rich in vegetables, fruits, and whole grains, while minimizing processed foods and avoiding saturated fats. Regular aerobic exercise is also recommended.  - A cholesterol panel will be ordered for future reference, but he is advised to hold off on this for now. A follow-up cholesterol check will be scheduled in 3 months to assess the effectiveness of the new medication. If he experiences any noticeable increase in muscle aches with the new cholesterol medication, he should inform us immediately.    Orders:    Lipid, Fasting; Future    rosuvastatin (CRESTOR) 20 MG tablet; Take 1 tablet by mouth daily    Comprehensive Metabolic Panel; Future

## 2025-06-02 NOTE — ASSESSMENT & PLAN NOTE
Chronic, at goal (stable), continue current treatment plan    Orders:    Lipid, Fasting; Future    Comprehensive Metabolic Panel; Future

## 2025-06-05 ENCOUNTER — HOSPITAL ENCOUNTER (OUTPATIENT)
Dept: CT IMAGING | Age: 70
Discharge: HOME | End: 2025-06-07
Attending: RADIOLOGY
Payer: MEDICARE

## 2025-06-05 DIAGNOSIS — C34.32 PRIMARY MALIGNANT NEOPLASM OF LEFT LOWER LOBE OF LUNG (HCC): ICD-10-CM

## 2025-06-05 PROCEDURE — 71250 CT THORAX DX C-: CPT

## 2025-06-09 ENCOUNTER — HOSPITAL ENCOUNTER (OUTPATIENT)
Dept: ULTRASOUND IMAGING | Age: 70
Discharge: HOME | End: 2025-06-11
Attending: INTERNAL MEDICINE
Payer: MEDICARE

## 2025-06-09 ENCOUNTER — HOSPITAL ENCOUNTER (OUTPATIENT)
Age: 70
Discharge: HOME | End: 2025-06-09
Attending: INTERNAL MEDICINE
Payer: MEDICARE

## 2025-06-09 DIAGNOSIS — C10.9 OROPHARYNGEAL CANCER (HCC): ICD-10-CM

## 2025-06-09 LAB
ALBUMIN SERPL-MCNC: 4.8 G/DL (ref 3.5–5.2)
ALBUMIN/GLOB SERPL: 1.9 {RATIO} (ref 1–2.5)
ALP SERPL-CCNC: 78 U/L (ref 40–129)
ALT SERPL-CCNC: 20 U/L (ref 10–50)
ANION GAP SERPL CALCULATED.3IONS-SCNC: 14 MMOL/L (ref 9–16)
AST SERPL-CCNC: 24 U/L (ref 10–50)
BASOPHILS # BLD: <0.03 K/UL (ref 0–0.2)
BASOPHILS NFR BLD: 0 % (ref 0–2)
BILIRUB SERPL-MCNC: 1.4 MG/DL (ref 0–1.2)
BUN SERPL-MCNC: 13 MG/DL (ref 8–23)
CALCIUM SERPL-MCNC: 10.2 MG/DL (ref 8.6–10.4)
CHLORIDE SERPL-SCNC: 101 MMOL/L (ref 98–107)
CO2 SERPL-SCNC: 25 MMOL/L (ref 20–31)
CREAT SERPL-MCNC: 1.1 MG/DL (ref 0.7–1.2)
EOSINOPHIL # BLD: 0.08 K/UL (ref 0–0.44)
EOSINOPHILS RELATIVE PERCENT: 2 % (ref 1–4)
ERYTHROCYTE [DISTWIDTH] IN BLOOD BY AUTOMATED COUNT: 13.1 % (ref 11.8–14.4)
GFR, ESTIMATED: 72 ML/MIN/1.73M2
GLUCOSE SERPL-MCNC: 97 MG/DL (ref 74–99)
HCT VFR BLD AUTO: 46.3 % (ref 40.7–50.3)
HGB BLD-MCNC: 15.6 G/DL (ref 13–17)
IMM GRANULOCYTES # BLD AUTO: 0.03 K/UL (ref 0–0.3)
IMM GRANULOCYTES NFR BLD: 1 %
LYMPHOCYTES NFR BLD: 1.31 K/UL (ref 1.1–3.7)
LYMPHOCYTES RELATIVE PERCENT: 27 % (ref 24–43)
MCH RBC QN AUTO: 30.3 PG (ref 25.2–33.5)
MCHC RBC AUTO-ENTMCNC: 33.7 G/DL (ref 28.4–34.8)
MCV RBC AUTO: 89.9 FL (ref 82.6–102.9)
MONOCYTES NFR BLD: 0.46 K/UL (ref 0.1–1.2)
MONOCYTES NFR BLD: 9 % (ref 3–12)
NEUTROPHILS NFR BLD: 61 % (ref 36–65)
NEUTS SEG NFR BLD: 3.05 K/UL (ref 1.5–8.1)
NRBC BLD-RTO: 0 PER 100 WBC
PLATELET # BLD AUTO: 310 K/UL (ref 138–453)
PMV BLD AUTO: 10 FL (ref 8.1–13.5)
POTASSIUM SERPL-SCNC: 4 MMOL/L (ref 3.7–5.3)
PROT SERPL-MCNC: 7.3 G/DL (ref 6.6–8.7)
RBC # BLD AUTO: 5.15 M/UL (ref 4.21–5.77)
SODIUM SERPL-SCNC: 140 MMOL/L (ref 136–145)
WBC OTHER # BLD: 4.9 K/UL (ref 3.5–11.3)

## 2025-06-09 PROCEDURE — 85025 COMPLETE CBC W/AUTO DIFF WBC: CPT

## 2025-06-09 PROCEDURE — 76536 US EXAM OF HEAD AND NECK: CPT

## 2025-06-09 PROCEDURE — 36415 COLL VENOUS BLD VENIPUNCTURE: CPT

## 2025-06-09 PROCEDURE — 80053 COMPREHEN METABOLIC PANEL: CPT

## 2025-06-24 ENCOUNTER — TELEPHONE (OUTPATIENT)
Age: 70
End: 2025-06-24

## 2025-06-24 ENCOUNTER — OFFICE VISIT (OUTPATIENT)
Age: 70
End: 2025-06-24
Payer: MEDICARE

## 2025-06-24 VITALS
BODY MASS INDEX: 27.44 KG/M2 | OXYGEN SATURATION: 97 % | DIASTOLIC BLOOD PRESSURE: 84 MMHG | SYSTOLIC BLOOD PRESSURE: 121 MMHG | TEMPERATURE: 97.5 F | HEART RATE: 79 BPM | WEIGHT: 180.5 LBS

## 2025-06-24 DIAGNOSIS — C10.9 OROPHARYNGEAL CANCER (HCC): Primary | ICD-10-CM

## 2025-06-24 PROCEDURE — 99204 OFFICE O/P NEW MOD 45 MIN: CPT | Performed by: INTERNAL MEDICINE

## 2025-06-24 PROCEDURE — 1159F MED LIST DOCD IN RCRD: CPT | Performed by: INTERNAL MEDICINE

## 2025-06-24 PROCEDURE — 1160F RVW MEDS BY RX/DR IN RCRD: CPT | Performed by: INTERNAL MEDICINE

## 2025-06-24 PROCEDURE — 3074F SYST BP LT 130 MM HG: CPT | Performed by: INTERNAL MEDICINE

## 2025-06-24 PROCEDURE — 1123F ACP DISCUSS/DSCN MKR DOCD: CPT | Performed by: INTERNAL MEDICINE

## 2025-06-24 PROCEDURE — 3079F DIAST BP 80-89 MM HG: CPT | Performed by: INTERNAL MEDICINE

## 2025-06-24 NOTE — PROGRESS NOTES
15.6 06/09/2025    HCT 46.3 06/09/2025    MCV 89.9 06/09/2025     06/09/2025    LYMPHOPCT 27 06/09/2025    RBC 5.15 06/09/2025    MCH 30.3 06/09/2025    MCHC 33.7 06/09/2025    RDW 13.1 06/09/2025    NEUTOPHILPCT 61 06/09/2025    MONOPCT 9 06/09/2025    EOSPCT 2 06/09/2025    BASOPCT 0 06/09/2025    NEUTROABS 3.05 06/09/2025    LYMPHSABS 1.31 06/09/2025    MONOSABS 0.46 06/09/2025    EOSABS 0.08 06/09/2025    BASOSABS <0.03 06/09/2025         Chemistry        Component Value Date/Time     06/09/2025 1000    K 4.0 06/09/2025 1000     06/09/2025 1000    CO2 25 06/09/2025 1000    BUN 13 06/09/2025 1000    CREATININE 1.1 06/09/2025 1000        Component Value Date/Time    CALCIUM 10.2 06/09/2025 1000    ALKPHOS 78 06/09/2025 1000    AST 24 06/09/2025 1000    ALT 20 06/09/2025 1000    BILITOT 1.4 (H) 06/09/2025 1000        PATHOLOGY DATA:   10/1/2021  8:43 AM - August Pierson Incoming Lab Results From [facility]    Component Collected Lab   Surgical Pathology Report 09/27/2021 10:13 AM [facility]   -- Diagnosis --   LEFT TONSIL, BIOPSIES:             -  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      Surgical Pathology Report  SURGICAL PATHOLOGY REPORT  Collected: 04/06/22 1230   Lab status: Final   Resulting lab: [email or web address]   Value: -- Diagnosis --   LUNG, LEFT LOWER LOBE, CT-GUIDED CORE NEEDLE BIOPSY:        -  ADENOCARCINOMA, CONSISTENT WITH LUNG PRIMARY.      Surgical Pathology Report  SURGICAL PATHOLOGY REPORT  Collected: 05/23/22 1235   Lab status: Final   Resulting lab: [email or web address]   Value: -- Diagnosis --   LUNG, LEFT LOWER LOBE, LOBECTOMY:   -LUNG INVASIVE ACINAR ADENOCARCINOMA, POORLY DIFFERENTIATED, SIZE 2.8   CM, WITH INVASION OF THE VISCERAL PLEURA WITH EXTENSION TO THE PLEURAL   SURFACE.       -MARGINS NEGATIVE.     -FIVE (5) LYMPH NODES NEGATIVE FOR CARCINOMA.

## 2025-06-24 NOTE — TELEPHONE ENCOUNTER
Instructions   from Dr. Charles Méndez MD    RTC in 6 months [facility]  RV 12- @ [facility]

## 2025-08-04 ENCOUNTER — COMMUNITY OUTREACH (OUTPATIENT)
Dept: PRIMARY CARE CLINIC | Age: 70
End: 2025-08-04

## 2025-08-15 DIAGNOSIS — I10 ESSENTIAL HYPERTENSION: ICD-10-CM

## 2025-08-18 RX ORDER — LISINOPRIL 20 MG/1
20 TABLET ORAL DAILY
Qty: 100 TABLET | Refills: 2 | Status: SHIPPED | OUTPATIENT
Start: 2025-08-18

## (undated) DEVICE — LOOP VES W13MM THK09MM MINI RED SIL FLD REPELLENT

## (undated) DEVICE — NEPTUNE E-SEP SMOKE EVACUATION PENCIL, COATED, 70MM BLADE, PUSH BUTTON SWITCH: Brand: NEPTUNE E-SEP

## (undated) DEVICE — ARM DRAPE

## (undated) DEVICE — GLOVE ORANGE PI 7   MSG9070

## (undated) DEVICE — POSITIONER HD W8XH4XL8.5IN RASPBERRY FOAM SLT

## (undated) DEVICE — SOLUTION ANTIFOG VIS SYS CLEARIFY LAPSCP

## (undated) DEVICE — BLADE ES L6IN ELASTOMERIC COAT EXT DURABLE BEND UPTO 90DEG

## (undated) DEVICE — MHPB HAND AND FOOT PACK: Brand: MEDLINE INDUSTRIES, INC.

## (undated) DEVICE — TOTAL TRAY, 16FR 10ML SIL FOLEY, URN: Brand: MEDLINE

## (undated) DEVICE — TISSUE RETRIEVAL SYSTEM: Brand: INZII RETRIEVAL SYSTEM

## (undated) DEVICE — TROCAR: Brand: KII FIOS FIRST ENTRY

## (undated) DEVICE — Device

## (undated) DEVICE — INSUFFLATION TUBING SET WITH FILTER, FUNNEL CONNECTOR AND LUER LOCK: Brand: JOSNOE MEDICAL INC

## (undated) DEVICE — ZIMMER® STERILE DISPOSABLE TOURNIQUET CUFF WITH PROTECTIVE SLEEVE AND PLC, DUAL PORT, SINGLE BLADDER, 18 IN. (46 CM)

## (undated) DEVICE — ELECTRODE PT RET AD L9FT HI MOIST COND ADH HYDRGEL CORDED

## (undated) DEVICE — LOOP VES W25MM THK1MM MAXI RED SIL FLD REPELLENT 100 PER

## (undated) DEVICE — ADHESIVE SKIN CLOSURE TOP 36 CC HI VISC DERMBND MINI

## (undated) DEVICE — CANNULA SEAL

## (undated) DEVICE — BLADE CLIPPER GEN PURP NS

## (undated) DEVICE — BANDAGE COMPR W4XL108IN WHT LAYERED NO CLSR SYN RUB ESMARCH

## (undated) DEVICE — SUTURE PERMAHAND SZ 0 L30IN NONABSORBABLE BLK SILK BRAID A306H

## (undated) DEVICE — SUTURE VCRL SZ 2-0 L27IN ABSRB VLT L26MM UR-6 5/8 CIR J602H

## (undated) DEVICE — REDUCER: Brand: ENDOWRIST

## (undated) DEVICE — DRESSING,GAUZE,XEROFORM,CURAD,1"X8",ST: Brand: CURAD

## (undated) DEVICE — STAPLER BLADELESS OBTURATOR, LONG: Brand: WECK VISTA

## (undated) DEVICE — GARMENT,MEDLINE,DVT,INT,CALF,MED, GEN2: Brand: MEDLINE

## (undated) DEVICE — SUTURE VCRL + SZ 4-0 L18IN ABSRB UD L19MM PS-2 3/8 CIR PRIM VCP496H

## (undated) DEVICE — PROTECTOR ULN NRV PUR FOAM HK LOOP STRP ANATOMICALLY

## (undated) DEVICE — DRESSING TRNSPAR W2XL2.75IN FLM SHT SEMIPERMEABLE WIND

## (undated) DEVICE — STAPLER 30 RELOAD WHITE: Brand: ENDOWRIST

## (undated) DEVICE — ZIMMER® STERILE DISPOSABLE TOURNIQUET CUFF WITH PLC, DUAL PORT, SINGLE BLADDER, 18 IN. (46 CM)

## (undated) DEVICE — MARKER,SKIN,WI/RULER AND LABELS: Brand: MEDLINE

## (undated) DEVICE — TIP APPL GEL PLT ENDO 5MMX32CM

## (undated) DEVICE — DRAPE,REIN 53X77,STERILE: Brand: MEDLINE

## (undated) DEVICE — DRESSING PETRO W3XL3IN OIL EMUL N ADH GZ KNIT IMPREG CELOS

## (undated) DEVICE — GLOVE SURG SZ 75 CRM LTX FREE POLYISOPRENE POLYMER BEAD ANTI

## (undated) DEVICE — INTENDED FOR TISSUE SEPARATION, AND OTHER PROCEDURES THAT REQUIRE A SHARP SURGICAL BLADE TO PUNCTURE OR CUT.: Brand: BARD-PARKER ® CARBON RIB-BACK BLADES

## (undated) DEVICE — STRAP,POSITIONING,KNEE/BODY,FOAM,4X60": Brand: MEDLINE

## (undated) DEVICE — APPLICATOR MEDICATED 26 CC SOLUTION HI LT ORNG CHLORAPREP

## (undated) DEVICE — KENDALL SCD EXPRESS SLEEVES, KNEE LENGTH, LARGE: Brand: KENDALL SCD

## (undated) DEVICE — SOLUTION SCRB 4OZ 4% CHG H2O AIDED FOR PREOPERATIVE SKIN

## (undated) DEVICE — AGENT HEMSTAT W2XL14IN OXIDIZED REGENERATED CELOS ABSRB FOR

## (undated) DEVICE — CONNECTOR TBNG WHT PLAS SUCT STR 5IN1 LTWT W/ M CONN

## (undated) DEVICE — RELOAD STPL H1-2.5XL35MM VASC THN TISS WHT B FRM NAT ARTC

## (undated) DEVICE — STAPLER SKIN L320MM 35MM VASC TISS 12 FIRING B FRM PWR

## (undated) DEVICE — SUTURE COAT VCRL SZ 4-0 L18IN ABSRB UD L19MM PS-2 1/2 CIR J496G

## (undated) DEVICE — PREMIUM DRY TRAY LF: Brand: MEDLINE INDUSTRIES, INC.

## (undated) DEVICE — CONNECTOR TBNG Y 6IN 1 PLAS LTWT

## (undated) DEVICE — AGENT HEMSTAT W6XL9IN OXIDIZED REGENERATED CELOS ABSRB FOR

## (undated) DEVICE — AIRSEAL 12 MM ACCESS PORT AND PALM GRIP OBTURATOR WITH BLADELESS OPTICAL TIP, 120 MM LENGTH: Brand: AIRSEAL

## (undated) DEVICE — TOWEL,OR,DSP,ST,NATURAL,DLX,4/PK,20PK/CS: Brand: MEDLINE

## (undated) DEVICE — GLOVE ORANGE PI 7 1/2   MSG9075

## (undated) DEVICE — BLADELESS OBTURATOR: Brand: WECK VISTA

## (undated) DEVICE — SUTURE PERMAHAND SZ 0 L30IN NONABSORBABLE BLK L26MM SH 1/2 K834H

## (undated) DEVICE — BLADE SCALPEL NO 15 STERILE

## (undated) DEVICE — SOLUTION SURG PREP POV IOD 7.5% 4 OZ

## (undated) DEVICE — SOLUTION IV IRRIG POUR BRL 0.9% SODIUM CHL 2F7124

## (undated) DEVICE — 3M™ IOBAN™ 2 ANTIMICROBIAL INCISE DRAPE 6650EZ: Brand: IOBAN™ 2

## (undated) DEVICE — TOWEL,OR,DSP,ST,BLUE,DLX,XR,4/PK,20PK/CS: Brand: MEDLINE

## (undated) DEVICE — GAUZE,SPONGE,FLUFF,6"X6.75",STRL,5/TRAY: Brand: MEDLINE

## (undated) DEVICE — SEAL

## (undated) DEVICE — BANDAGE GZ W2XL75IN ST RAYON POLY CNFRM STRTCH LTWT

## (undated) DEVICE — SUTURE PROL SZ 3-0 L18IN NONABSORBABLE BLU L24MM FS-1 3/8 8684G

## (undated) DEVICE — CATHETER THORACENTESIS STR 28 FRX23 IN 6 EYELET TAPR TIP LF

## (undated) DEVICE — VESSEL SEALER EXTEND: Brand: ENDOWRIST

## (undated) DEVICE — CANNULA TIP SPRY MAGELLAN

## (undated) DEVICE — GOWN,AURORA,NONREINFORCED,LARGE: Brand: MEDLINE

## (undated) DEVICE — POSITIONER,HEAD,MULTIRING,36CS: Brand: MEDLINE

## (undated) DEVICE — DRAPE,UTILITY,XL,4/PK,STERILE: Brand: MEDLINE

## (undated) DEVICE — UNIT DRNGE SGL COLL W/ INLINE CONN EXPR

## (undated) DEVICE — SCISSOR SURG METZ CRV TIP

## (undated) DEVICE — RELOAD STPL L45MM H2-4.1MM THCK TISS GRN GRIPPING SURF B

## (undated) DEVICE — GLOVE SURG SZ 65 THK91MIL LTX FREE SYN POLYISOPRENE

## (undated) DEVICE — KIT APPL 11:1 PROC W/ FIBRIJET MED CUP APPL TIP TY

## (undated) DEVICE — STAPLER INT L340MM 45MM STD 12 FIRING B FRM PWR + GRIPPING

## (undated) DEVICE — POUCH INSTR W6.75XL11.5IN FRST 2 PKT ADH FOR ORTH AND

## (undated) DEVICE — STAPLER SHEATH: Brand: ENDOWRIST

## (undated) DEVICE — PAD N ADH W3XL4IN POLY COT SFT PERF FLM EASILY CUT ABSRB

## (undated) DEVICE — SYRINGE MED 50ML LUERLOCK TIP

## (undated) DEVICE — CONNECTOR,TUBING,5-IN-1,NON-STERILE: Brand: MEDLINE INDUSTRIES, INC.

## (undated) DEVICE — DISSECTOR LAP DIA5MM BLNT TIP ENDOPATH

## (undated) DEVICE — NEEDLE SPINAL 22GA L3.5IN SPINOCAN

## (undated) DEVICE — CONTAINER,SPECIMEN,4OZ,OR STRL: Brand: MEDLINE

## (undated) DEVICE — GEL PLATELET ANGEL PREP